# Patient Record
Sex: MALE | Race: WHITE | NOT HISPANIC OR LATINO | Employment: OTHER | URBAN - METROPOLITAN AREA
[De-identification: names, ages, dates, MRNs, and addresses within clinical notes are randomized per-mention and may not be internally consistent; named-entity substitution may affect disease eponyms.]

---

## 2020-12-22 ENCOUNTER — RECORDS - HEALTHEAST (OUTPATIENT)
Dept: LAB | Facility: CLINIC | Age: 65
End: 2020-12-22

## 2020-12-22 LAB
SARS-COV-2 PCR COMMENT: NORMAL
SARS-COV-2 RNA SPEC QL NAA+PROBE: NEGATIVE
SARS-COV-2 VIRUS SPECIMEN SOURCE: NORMAL

## 2021-09-16 DIAGNOSIS — Z11.59 ENCOUNTER FOR SCREENING FOR OTHER VIRAL DISEASES: ICD-10-CM

## 2021-09-26 ENCOUNTER — LAB (OUTPATIENT)
Dept: FAMILY MEDICINE | Facility: CLINIC | Age: 66
End: 2021-09-26
Attending: OPHTHALMOLOGY
Payer: MEDICARE

## 2021-09-26 DIAGNOSIS — Z11.59 ENCOUNTER FOR SCREENING FOR OTHER VIRAL DISEASES: ICD-10-CM

## 2021-09-26 PROCEDURE — U0005 INFEC AGEN DETEC AMPLI PROBE: HCPCS

## 2021-09-26 PROCEDURE — U0003 INFECTIOUS AGENT DETECTION BY NUCLEIC ACID (DNA OR RNA); SEVERE ACUTE RESPIRATORY SYNDROME CORONAVIRUS 2 (SARS-COV-2) (CORONAVIRUS DISEASE [COVID-19]), AMPLIFIED PROBE TECHNIQUE, MAKING USE OF HIGH THROUGHPUT TECHNOLOGIES AS DESCRIBED BY CMS-2020-01-R: HCPCS

## 2021-09-27 LAB — SARS-COV-2 RNA RESP QL NAA+PROBE: NEGATIVE

## 2021-09-28 ENCOUNTER — ANESTHESIA EVENT (OUTPATIENT)
Dept: SURGERY | Facility: CLINIC | Age: 66
End: 2021-09-28
Payer: MEDICARE

## 2021-09-28 NOTE — ANESTHESIA PREPROCEDURE EVALUATION
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 No pertinent surgical history.   No Known Allergies   Social History     Tobacco Use     Smoking status: Never Smoker     Smokeless tobacco: Never Used   Substance Use Topics     Alcohol use: Not Currently      Wt Readings from Last 1 Encounters:   No data found for Wt        Anesthesia Evaluation            ROS/MED HX  ENT/Pulmonary: Comment: [facility] - neg pulmonary ROS     Neurologic:  - neg neurologic ROS     Cardiovascular:     (+) Dyslipidemia hypertension-----    METS/Exercise Tolerance:     Hematologic:  - neg hematologic  ROS     Musculoskeletal:   (+) arthritis,     GI/Hepatic:  - neg GI/hepatic ROS     Renal/Genitourinary:  - neg Renal ROS     Endo:     (+) type II DM,     Psychiatric/Substance Use:  - neg psychiatric ROS     Infectious Disease:       Malignancy:       Other:  - neg other ROS          Physical Exam    Airway        Mallampati: II   TM distance: > 3 FB   Neck ROM: full   Mouth opening: > 3 cm    Respiratory Devices and Support         Dental  no notable dental history         Cardiovascular   cardiovascular exam normal          Pulmonary   pulmonary exam normal                OUTSIDE LABS:  CBC: No results found for: WBC, HGB, HCT, PLT  BMP: No results found for: NA, POTASSIUM, CHLORIDE, CO2, BUN, CR, GLC  COAGS: No results found for: PTT, INR, FIBR  POC: No results found for: BGM, HCG, HCGS  HEPATIC: No results found for: ALBUMIN, PROTTOTAL, ALT, AST, GGT, ALKPHOS, BILITOTAL, BILIDIRECT, LANDY  OTHER: No results found for: PH, LACT, A1C, RAUDEL, PHOS, MAG, LIPASE, AMYLASE, TSH, T4, T3, CRP, SED    Anesthesia Plan    ASA Status:  2      Anesthesia Type: General.   Induction: Intravenous.            Consents    Anesthesia Plan(s) and associated risks, benefits, and realistic alternatives discussed. Questions answered and patient/representative(s) expressed understanding.     - Discussed with:  Patient         Postoperative Care            Comments:                DANIEL Chance CRNA

## 2021-09-29 ENCOUNTER — HOSPITAL ENCOUNTER (OUTPATIENT)
Facility: CLINIC | Age: 66
Discharge: HOME | End: 2021-09-29
Attending: OPHTHALMOLOGY | Admitting: OPHTHALMOLOGY
Payer: MEDICARE

## 2021-09-29 ENCOUNTER — ANESTHESIA (OUTPATIENT)
Dept: SURGERY | Facility: CLINIC | Age: 66
End: 2021-09-29
Payer: MEDICARE

## 2021-09-29 VITALS
BODY MASS INDEX: 29.82 KG/M2 | DIASTOLIC BLOOD PRESSURE: 75 MMHG | RESPIRATION RATE: 16 BRPM | HEART RATE: 55 BPM | WEIGHT: 190 LBS | HEIGHT: 67 IN | SYSTOLIC BLOOD PRESSURE: 129 MMHG | TEMPERATURE: 96.8 F | OXYGEN SATURATION: 99 %

## 2021-09-29 LAB — GLUCOSE BLDC GLUCOMTR-MCNC: 152 MG/DL (ref 70–99)

## 2021-09-29 PROCEDURE — 250N000011 HC RX IP 250 OP 636: Performed by: OPHTHALMOLOGY

## 2021-09-29 PROCEDURE — 370N000004 HC ANESTHESIA CATARACT PACKAGE: Performed by: OPHTHALMOLOGY

## 2021-09-29 PROCEDURE — 250N000011 HC RX IP 250 OP 636: Performed by: NURSE ANESTHETIST, CERTIFIED REGISTERED

## 2021-09-29 PROCEDURE — 250N000009 HC RX 250: Performed by: OPHTHALMOLOGY

## 2021-09-29 PROCEDURE — 250N000009 HC RX 250: Performed by: NURSE ANESTHETIST, CERTIFIED REGISTERED

## 2021-09-29 PROCEDURE — 258N000003 HC RX IP 258 OP 636: Performed by: NURSE ANESTHETIST, CERTIFIED REGISTERED

## 2021-09-29 PROCEDURE — 761N000008 HC RECOVERY CATRACT PACKAGE: Performed by: OPHTHALMOLOGY

## 2021-09-29 PROCEDURE — 360N000007 HC CATARACT SURGICAL PACKAGE: Performed by: OPHTHALMOLOGY

## 2021-09-29 PROCEDURE — V2632 POST CHMBR INTRAOCULAR LENS: HCPCS | Performed by: OPHTHALMOLOGY

## 2021-09-29 DEVICE — EYE IMP IOL AMO PCL TECNIS ZCB00 18.0: Type: IMPLANTABLE DEVICE | Site: EYE | Status: FUNCTIONAL

## 2021-09-29 RX ORDER — MULTIPLE VITAMINS W/ MINERALS TAB 9MG-400MCG
1 TAB ORAL DAILY
COMMUNITY

## 2021-09-29 RX ORDER — SODIUM CHLORIDE, SODIUM LACTATE, POTASSIUM CHLORIDE, CALCIUM CHLORIDE 600; 310; 30; 20 MG/100ML; MG/100ML; MG/100ML; MG/100ML
INJECTION, SOLUTION INTRAVENOUS CONTINUOUS
Status: DISCONTINUED | OUTPATIENT
Start: 2021-09-29 | End: 2021-09-29 | Stop reason: HOSPADM

## 2021-09-29 RX ORDER — LIDOCAINE 40 MG/G
CREAM TOPICAL
Status: DISCONTINUED | OUTPATIENT
Start: 2021-09-29 | End: 2021-09-29 | Stop reason: HOSPADM

## 2021-09-29 RX ORDER — ATORVASTATIN CALCIUM 20 MG/1
20 TABLET, FILM COATED ORAL DAILY
COMMUNITY

## 2021-09-29 RX ORDER — LISINOPRIL 40 MG/1
40 TABLET ORAL DAILY
COMMUNITY

## 2021-09-29 RX ORDER — LIDOCAINE HYDROCHLORIDE 20 MG/ML
JELLY TOPICAL PRN
Status: DISCONTINUED | OUTPATIENT
Start: 2021-09-29 | End: 2021-09-29 | Stop reason: HOSPADM

## 2021-09-29 RX ORDER — TROPICAMIDE 10 MG/ML
1 SOLUTION/ DROPS OPHTHALMIC
Status: COMPLETED | OUTPATIENT
Start: 2021-09-29 | End: 2021-09-29

## 2021-09-29 RX ORDER — BALANCED SALT SOLUTION 6.4; .75; .48; .3; 3.9; 1.7 MG/ML; MG/ML; MG/ML; MG/ML; MG/ML; MG/ML
SOLUTION OPHTHALMIC PRN
Status: DISCONTINUED | OUTPATIENT
Start: 2021-09-29 | End: 2021-09-29 | Stop reason: HOSPADM

## 2021-09-29 RX ORDER — HYDROCHLOROTHIAZIDE 25 MG/1
25 TABLET ORAL DAILY
COMMUNITY

## 2021-09-29 RX ORDER — ASPIRIN 81 MG/1
81 TABLET ORAL DAILY
COMMUNITY

## 2021-09-29 RX ORDER — PROPARACAINE HYDROCHLORIDE 5 MG/ML
SOLUTION/ DROPS OPHTHALMIC PRN
Status: DISCONTINUED | OUTPATIENT
Start: 2021-09-29 | End: 2021-09-29 | Stop reason: HOSPADM

## 2021-09-29 RX ADMIN — TROPICAMIDE 1 DROP: 10 SOLUTION/ DROPS OPHTHALMIC at 07:15

## 2021-09-29 RX ADMIN — CYCLOPENTOLATE HYDROCHLORIDE AND PHENYLEPHRINE HYDROCHLORIDE 1 DROP: 2; 10 SOLUTION/ DROPS OPHTHALMIC at 07:30

## 2021-09-29 RX ADMIN — MIDAZOLAM 2 MG: 1 INJECTION INTRAMUSCULAR; INTRAVENOUS at 08:46

## 2021-09-29 RX ADMIN — CYCLOPENTOLATE HYDROCHLORIDE AND PHENYLEPHRINE HYDROCHLORIDE 1 DROP: 2; 10 SOLUTION/ DROPS OPHTHALMIC at 07:15

## 2021-09-29 RX ADMIN — SODIUM CHLORIDE, POTASSIUM CHLORIDE, SODIUM LACTATE AND CALCIUM CHLORIDE: 600; 310; 30; 20 INJECTION, SOLUTION INTRAVENOUS at 07:31

## 2021-09-29 RX ADMIN — LIDOCAINE HYDROCHLORIDE 0.1 ML: 10 INJECTION, SOLUTION EPIDURAL; INFILTRATION; INTRACAUDAL; PERINEURAL at 07:31

## 2021-09-29 RX ADMIN — TROPICAMIDE 1 DROP: 10 SOLUTION/ DROPS OPHTHALMIC at 07:30

## 2021-09-29 RX ADMIN — CYCLOPENTOLATE HYDROCHLORIDE AND PHENYLEPHRINE HYDROCHLORIDE 1 DROP: 2; 10 SOLUTION/ DROPS OPHTHALMIC at 07:35

## 2021-09-29 RX ADMIN — TROPICAMIDE 1 DROP: 10 SOLUTION/ DROPS OPHTHALMIC at 07:35

## 2021-09-29 ASSESSMENT — MIFFLIN-ST. JEOR: SCORE: 1600.46

## 2021-09-29 NOTE — ANESTHESIA CARE TRANSFER NOTE
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    Anesthesia Type:   General     Note:    Oropharynx: spontaneously breathing  Level of Consciousness: drowsy  Oxygen Supplementation: room air    Independent Airway: airway patency satisfactory and stable  Dentition: dentition unchanged  Vital Signs Stable: post-procedure vital signs reviewed and stable  Report to RN Given: handoff report given  Patient transferred to: Phase II    Handoff Report: Identifed the Patient, Identified the Reponsible Provider, Reviewed the pertinent medical history, Discussed the surgical course, Reviewed Intra-OP anesthesia mangement and issues during anesthesia, Set expectations for post-procedure period and Allowed opportunity for questions and acknowledgement of understanding      Vitals:  Vitals Value Taken Time   BP     Temp     Pulse     Resp     SpO2 98 % 09/29/21 0908   Vitals shown include unvalidated device data.    Electronically Signed By: DANIEL Jacobson CRNA  September 29, 2021  9:09 AM

## 2021-09-29 NOTE — OR NURSING
To sds. Awake and alert. Doing well with no pain. Up to recliner to eat and drink milli well. Will discharge when ready. Call for ride from friend mitra. Instructions reviewed and no questions. talks to pt postop

## 2021-09-29 NOTE — OP NOTE
OPHTHALMOLOGY OPERATIVE NOTE    PATIENT: Ezra Mcbride  DATE OF SURGERY: 9/29/2021  PREOPERATIVE DIAGNOSIS:  Senile Nuclear Cataract, Right eye  POSTOPERATIVE DIAGNOSIS:  Senile Nuclear Cataract, Right eye  OPERATIVE PROCEDURE:  Phacoemulsification with placement of intraocular lens  SURGEON:  Juan Prado MD  ANESTHESIA:  Topical / MAC  EBL:  None  SPECIMENS:  None  COMPLICATIONS:  None    PROCEDURE:  The patient was brought to the operating room at [facility].  The right eye was prepped and draped in the usual fashion for cataract surgery.  A wire lid speculum was inserted.  A super sharp blade was used to make a paracentesis at the 11 O'clock position.  The super sharp blade was used to make a partial thickness temporal groove, which was 3 mm in length.  0.8 mL of non-preserved epi-Shugarcaine was injected into the anterior chamber.  Viscoelastic was used to inflate the anterior chamber through a cannula.  A 2.5 mm microkeratome was used to make a temporal clear corneal incision in a two-plane fashion.  A cystotome needle and forceps were used to make a capsulorrhexis.  Hydrodissection and hydrodelineation were performed with Balance Salt Solution.  The lens was then phacoemulsified and removed without complications.  The cortical material was removed with bimanual irrigation and aspiration.  The capsular bag was filled with viscoelastic.  A posterior chamber intraocular lens, preselected and recorded, was folded and inserted into the capsular bag.  The viscoelastic was removed with the irrigation and aspiration tip.  Balanced Salt Solution with Vigamox, 150mg/0.1mL, was used to refill the anterior chamber.  The wounds were checked for water tightness and required no suture.  The wire lid speculum was removed.  The patient's right eye was cleaned and a drop of each post-operative drop was placed, followed by a pablo shield.  The patient tolerated the procedure well, and there  were no complications.      Juan Prado MD

## 2021-09-29 NOTE — ANESTHESIA POSTPROCEDURE EVALUATION
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    Anesthesia Type:  General    Note:  Disposition: Outpatient   Postop Pain Control: Uneventful            Sign Out: Well controlled pain   PONV: No   Neuro/Psych: Uneventful            Sign Out: Acceptable/Baseline neuro status   Airway/Respiratory: Uneventful            Sign Out: Acceptable/Baseline resp. status   CV/Hemodynamics: Uneventful            Sign Out: Acceptable CV status   Other NRE: NONE   DID A NON-ROUTINE EVENT OCCUR? No           Last vitals:  Vitals Value Taken Time   BP     Temp     Pulse     Resp     SpO2 98 % 09/29/21 0908   Vitals shown include unvalidated device data.    Electronically Signed By: DANIEL Jacobson CRNA  September 29, 2021  9:09 AM

## 2022-02-01 ENCOUNTER — LAB REQUISITION (OUTPATIENT)
Dept: LAB | Facility: CLINIC | Age: 67
End: 2022-02-01

## 2022-02-01 PROCEDURE — U0005 INFEC AGEN DETEC AMPLI PROBE: HCPCS | Performed by: INTERNAL MEDICINE

## 2022-02-02 LAB — SARS-COV-2 RNA RESP QL NAA+PROBE: POSITIVE

## 2024-04-18 ENCOUNTER — TRANSFERRED RECORDS (OUTPATIENT)
Dept: HEALTH INFORMATION MANAGEMENT | Facility: CLINIC | Age: 69
End: 2024-04-18

## 2024-04-23 ENCOUNTER — TRANSFERRED RECORDS (OUTPATIENT)
Dept: HEALTH INFORMATION MANAGEMENT | Facility: CLINIC | Age: 69
End: 2024-04-23

## 2024-04-29 ENCOUNTER — TRANSFERRED RECORDS (OUTPATIENT)
Dept: HEALTH INFORMATION MANAGEMENT | Facility: CLINIC | Age: 69
End: 2024-04-29

## 2024-05-02 ENCOUNTER — TRANSFERRED RECORDS (OUTPATIENT)
Dept: HEALTH INFORMATION MANAGEMENT | Facility: CLINIC | Age: 69
End: 2024-05-02

## 2024-05-13 ENCOUNTER — TRANSFERRED RECORDS (OUTPATIENT)
Dept: HEALTH INFORMATION MANAGEMENT | Facility: CLINIC | Age: 69
End: 2024-05-13

## 2024-05-16 ENCOUNTER — TRANSFERRED RECORDS (OUTPATIENT)
Dept: HEALTH INFORMATION MANAGEMENT | Facility: CLINIC | Age: 69
End: 2024-05-16

## 2024-05-21 ENCOUNTER — TRANSFERRED RECORDS (OUTPATIENT)
Dept: HEALTH INFORMATION MANAGEMENT | Facility: CLINIC | Age: 69
End: 2024-05-21

## 2024-05-21 LAB — EJECTION FRACTION: 58.5 %

## 2024-05-30 ENCOUNTER — TRANSFERRED RECORDS (OUTPATIENT)
Dept: HEALTH INFORMATION MANAGEMENT | Facility: CLINIC | Age: 69
End: 2024-05-30

## 2024-07-01 NOTE — PROGRESS NOTES
[facility]    RADIATION ONCOLOGY CONSULTATION      Tal Mcbride  is referred by Dr. Chris Coe for an oncology consultation.    PRIMARY PHYSICIAN: Juan Tariq     Cancer Staging   No matching staging information was found for the patient.      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 He has significant mesorectal adenopathy. The seminal vesicle border is close but not radiographically involved. He has had a colostomy placed as well. Ov erall, he seems to be comfortable with his diagnosis and has a reasonable grasp on tri-modality therapy.     PLAN: I would recommend total neoadjuvant therapy with chemotherapy followed by radiation and chemotherapy.  Surgical evaluation would follow. I reviewed in general the course of radiation with him.  He has indicated that he would prefer treatment here at [facility] so a referral for transfer of care will be set up. It has been a few weeks since his radiological workup, so I am going to order a PET/CT scan for staging as well.     No orders of the defined types were placed in this encounter.     ______________________________________________________________________  HISTORY OF PRESENT ILLNESS: Ezra is a 68-year-old male who works part-time at [email or web address], presenting with moderately to poorly differentiated adenocarcinoma of the rectum.  This mass is noted 8 cm from the anal verge involving the low, middle and upper rectum extending over a length of roughly 12 cm.  Involving the mesorectum with significant invasion anterior laterally on the right at the level of the mid rectum.  It is contiguous with the posterior margin of the right seminal vesicle but is not clearly invaded.  There is mesorectal adenopathy.  To date he has met with surgery in which the discussion included a diverting loop colostomy versus a permanent colostomy.  In addition he has met with medical oncology to discuss total  neoadjuvant chemoradiotherapy followed by surgery and/or additional chemotherapy.  History as below    4/23/2024 CT abdomen with and without contrast: There is a 6.7 cm circumscribed fluid density lesion in the left renal pelvis consistent with marked pyelo calyceal distention.  There is marked mural thickening involving the distal sigmoid colon and rectum, recommend direct visualization.    4/29/2024 sigmoidoscopy operative note: The digital rectal exam revealed a soft rectal mass palpated 8 cm from the anal verge.  The mass was noncircumferential and located predominantly at the posterior bowel wall.  An infiltrative partially obstructing large mass was found in the rectum.  The mass was circumferential.  The mass measures 4 cm in length.  No bleeding present.    4/29/2024 duodenum, biopsy: No significant pathologic abnormality.  Stomach, biopsy: No significant pathologic abnormality.  Rectal mass, 8 cm from anal verge, biopsy: Moderately to poorly differentiated adenocarcinoma, ulcerated.  There is focal submucosal spread of the tumor involving a fragment with benign nonneoplastic overlying colonic mucosa.  The pattern raises concern for lymphatic invasion.  Depth of invasion cannot be determined.  There is no definite adenomatous polyp in the background.    5/2/2024 CT chest with contrast: No definite findings for metastatic disease in the chest.  2 small pulmonary nodules measuring up to 4 mm unchanged from prior study    5/2/2024 MR pelvis with and without contrast: Rectal carcinoma involving the lower, middle and upper thirds, stage T3c N2b.  A carcinoma involving the low, middle and upper rectum extending over a length of roughly 12 cm is demonstrated.  The lesion involves the mesorectum with significant invasion anterolaterally on the right at the level of the mid rectum.  At this site infiltrative neoplasm is contiguous with the posterior margin of the right seminal vesicle but is not clearly invaded.   Neoplasm extends roughly 1.5 cm beyond the rectal margin.  There are numerous enlarged mesorectal lymph nodes demonstrated.    5/13/2024 medical oncology consultation: Discussed plan per NCCN guidelines to include total neoadjuvant chemotherapy plus or minus radiation for 6 months followed by potential surgery.    5/29/2024 surgery consult: Discussed diverting loop colostomy versus permanent colostomy.  Wife is in favor of a permanent colostomy.  Patient to decide.    5/30/2024 CT abdomen pelvis: Large infiltrative rectal mass involving the upper middle and lower rectum.  There is surrounding inflammatory change and pelvic free fluid.  There is adjacent mesorectal adenopathy a left internal iliac node as well.  Enlarged prostate gland.  Left probable UPJ obstruction.    Scheduling Conflicts: Denies   Systemic Therapy: Pending   Port: Yes  Pacemaker: denies  Hx of autoimmune disorders: denies   Previous Radiation Therapy: denies       Depression Screening Follow Up        Follow Up Actions Taken  Crisis resource information provided in After Visit Summary  Patient declined referral.     Patient agreeable to meet with .  spent a great amount of time with patient. See note associated with this encounter.     Past Medical History:   Diagnosis Date    Diabetes (H)     Hypertension        Past Surgical History:   Procedure Laterality Date    PHACOEMULSIFICATION WITH STANDARD INTRAOCULAR LENS IMPLANT Right 9/29/2021    Procedure: right Cataract Extraction with Implant;  Surgeon: Juan Prado MD;  Location: [facility] OR       No family history on file.    Social History     Tobacco Use    Smoking status: Never    Smokeless tobacco: Never   Substance Use Topics    Alcohol use: Not Currently   Works part time at [employer]       CURRENT MEDICATIONS:   Current Outpatient Medications   Medication Sig Dispense Refill    aspirin 81 MG EC tablet Take 81 mg by mouth daily      atorvastatin (LIPITOR) 20  MG tablet Take 20 mg by mouth daily      hydrochlorothiazide (HYDRODIURIL) 25 MG tablet Take 25 mg by mouth daily      lisinopril (ZESTRIL) 40 MG tablet Take 40 mg by mouth daily      metFORMIN (GLUCOPHAGE) 1000 MG tablet Take 1,000 mg by mouth 2 times daily (with meals)      multivitamin w/minerals (MULTI-VITAMIN) tablet Take 1 tablet by mouth daily       No current facility-administered medications for this visit.         ALLERGIES:  No Known Allergies        Review of Systems   Constitutional:  Positive for malaise/fatigue and weight loss. Negative for chills and fever.   Respiratory:  Negative for shortness of breath.    Cardiovascular:  Negative for chest pain.   Gastrointestinal:  Negative for abdominal pain, diarrhea (Has colostomy), nausea and vomiting.   Psychiatric/Behavioral:  The patient has insomnia (due to rectal discomfort).            VITAL SIGNS:  There were no vitals taken for this visit.  Wt Readings from Last 4 Encounters:   09/29/21 86.2 kg (190 lb)       PHYSICAL EXAM:  Constitutional: awake, alert, cooperative, no apparent distress, and appears stated age  Eyes: Lids and lashes normal, sclera clear, conjunctiva normal  ENT: normocephalic, without obvious abnormality  Hematologic / Lymphatic: no cervical lymphadenopathy and no supraclavicular lymphadenopathy  Respiratory: No increased work of breathing, good air exchange, clear to auscultation bilaterally, no crackles or wheezing  Cardiovascular: normal S1 and S2  GI: soft, non-distended, colostomy present  Skin: no redness, warmth, or swelling  Musculoskeletal: tone is normal, patient sits alternating hip to hip as sitting directly on his bottom would be painful   Neurologic: Awake, alert, oriented to name, place and time.  gait is normal.  Neuropsychiatric: General: normal, calm, and normal eye contact    Physician attestation:  I saw and evaluated patient as part of a shared APRN visit. I personally reviewed his chart and xray studies. Key  management decisions made by me and carried out under my direction include: referral to medical oncology in [place] and getting a PET/CT scan for further staging.     JAMAAL spent 10 minutes with the patient and 5  minutes on chart preparation and review, for a total of 15 minutes   Physician spent 20 minutes with the patient and 20 minutes on chart, image, and documentation review, for a total of 40 minutes.     Total billable time: 55 minutes on this date of service           DANIEL Gonzales CNP

## 2024-07-03 ENCOUNTER — DOCUMENTATION ONLY (OUTPATIENT)
Dept: RADIATION ONCOLOGY | Facility: HOSPITAL | Age: 69
End: 2024-07-03

## 2024-07-03 ENCOUNTER — ONCOLOGY VISIT (OUTPATIENT)
Dept: RADIATION ONCOLOGY | Facility: HOSPITAL | Age: 69
End: 2024-07-03
Attending: RADIOLOGY
Payer: COMMERCIAL

## 2024-07-03 VITALS
BODY MASS INDEX: 23.73 KG/M2 | RESPIRATION RATE: 16 BRPM | DIASTOLIC BLOOD PRESSURE: 70 MMHG | HEART RATE: 68 BPM | HEIGHT: 69 IN | SYSTOLIC BLOOD PRESSURE: 138 MMHG | TEMPERATURE: 99.1 F | OXYGEN SATURATION: 97 % | WEIGHT: 160.2 LBS

## 2024-07-03 DIAGNOSIS — C20 RECTAL CANCER (H): Primary | ICD-10-CM

## 2024-07-03 PROBLEM — M41.125 ADOLESCENT IDIOPATHIC SCOLIOSIS OF THORACOLUMBAR REGION: Status: ACTIVE | Noted: 2017-11-16

## 2024-07-03 PROBLEM — E43 SEVERE PROTEIN-CALORIE MALNUTRITION (H): Status: ACTIVE | Noted: 2024-06-07

## 2024-07-03 PROCEDURE — G0463 HOSPITAL OUTPT CLINIC VISIT: HCPCS

## 2024-07-03 PROCEDURE — 99205 OFFICE O/P NEW HI 60 MIN: CPT | Mod: FS | Performed by: RADIOLOGY

## 2024-07-03 RX ORDER — ACETAMINOPHEN 500 MG
650 TABLET ORAL EVERY 6 HOURS PRN
COMMUNITY

## 2024-07-03 ASSESSMENT — ENCOUNTER SYMPTOMS
INSOMNIA: 1
NAUSEA: 0
FEVER: 0
ABDOMINAL PAIN: 0
SHORTNESS OF BREATH: 0
DIARRHEA: 0
CHILLS: 0
VOMITING: 0
WEIGHT LOSS: 1

## 2024-07-03 ASSESSMENT — PATIENT HEALTH QUESTIONNAIRE - PHQ9: SUM OF ALL RESPONSES TO PHQ QUESTIONS 1-9: 17

## 2024-07-03 NOTE — PROGRESS NOTES
"Radiation Oncology Rooming Note    July 3, 2024 8:26 AM     Tal Mcbride is a 68 year old male who presents for:       Chief Complaint   Patient presents with    Consult     Radiation therapy consult       Initial Vitals: Wt 72.7 kg (160 lb 3.2 oz)   BMI 25.09 kg/m     Estimated body mass index is 25.09 kg/m  as calculated from the following:    Height as of 9/29/21: 1.702 m (5' 7\").    Weight as of this encounter: 72.7 kg (160 lb 3.2 oz).   Body surface area is 1.85 meters squared.  Data Unavailable Comment: Data Unavailable       Allergies reviewed: Yes  Medications reviewed: Yes      Patient completed the following questionnaires: PHQ-9 and NCCN Distress Thermometer.     PHQ-9= 17.  Providers notified and patient met with SW.      Patient was assessed using the NCCN psychosocial distress thermometer. Patient rated the score as a patient declined to complete form. Patient rated current stressors as NA. Stressors brought to the attention of provider for a score of 6 or greater or per nurses discretion.       Patient received folder containing the following:  advance directives information/application  financial letter  vaccines during cancer treatment hand out  mental health services and providers packet  cancer resource list  cancer rehab information handout  family support group handouts  radiation therapy business card      Financial assistance resources given to patient:  [employer] application  [employer] application   [employer]      Nutrition Assessment    Height: Data Unavailable Weight: 160 lbs 3.2 oz    Usual Weight: 200# Weight Change: -40# since 5/2024,  lowest weight 150# gained 10 lbs over the past month      Past Medical History:   Diagnosis Date    Adolescent idiopathic scoliosis of thoracolumbar region 11/16/2017    Bunion 03/28/2013    Diabetes (H)     Essential hypertension 03/05/2012    Formatting of this note might be different from the original.   Epic      Hypertension     Pure " hypercholesterolemia 10/04/2012    Rectal cancer (H) 05/29/2024    Severe protein-calorie malnutrition (H24) 06/07/2024    Type 2 diabetes mellitus without complication (H) 06/21/2012       1. Receiving Chemotherapy? Yes  - 1 point  2. Weight Loss per Month (in pounds) Based on Usual Weight: 3    100# 100-120# 120-150# 150-200# greater than 200# Pt. System   greater than 5 5 - 6 6 - 8 7 - 10 greater than 10 1 Point   greater than 7 7- 9 9 - 11 11 - 14 greater than 15 2 Points   greater than 10 10 - 12 12 - 15 15 - 20 greater than 20 3 Points       3. Eating Problems: ( 1 Point for Each Problem)       Loss of Appetite     No - 0 points    Difficulty Swallowing    No - 0 points   Nausea    No - 0 points    Early Satiety    No - 0 points   Vomiting    No - 0 points    Taste/Smell Aversions  No - 0 points    Diarrhea    No - 0 points    Esophageal Reflux   No - 0 points   Mouth Soreness/Difficulty Chewing  No - 0 points          Total: 0    4.  Automatic Referral for the Following Diagnosis or Situations:  NA     Comments: Patient declines dietary referral.  He is is now able to eat a regular diet and is gaining weight.      Total Score: 4 (Scores greater than or equal to 4 will receive a Dietician Consult)        Marie Collado RN

## 2024-07-03 NOTE — NURSING NOTE
PET/CT is booked out unitl 8/14/24 at [facility].  Spoke to patient and he is in agreement with having this done at [facility].  PET/CT order, demographics, and radiation oncology note emailed to [facility] Central Scheduling.      Marie Collado RN

## 2024-07-16 ENCOUNTER — TRANSFERRED RECORDS (OUTPATIENT)
Dept: HEALTH INFORMATION MANAGEMENT | Facility: CLINIC | Age: 69
End: 2024-07-16

## 2024-07-17 ENCOUNTER — ONCOLOGY VISIT (OUTPATIENT)
Dept: ONCOLOGY | Facility: OTHER | Age: 69
End: 2024-07-17
Payer: COMMERCIAL

## 2024-07-17 ENCOUNTER — CARE COORDINATION (OUTPATIENT)
Dept: BEHAVIORAL HEALTH | Facility: OTHER | Age: 69
End: 2024-07-17

## 2024-07-17 VITALS
TEMPERATURE: 98.2 F | OXYGEN SATURATION: 96 % | SYSTOLIC BLOOD PRESSURE: 126 MMHG | BODY MASS INDEX: 23.77 KG/M2 | DIASTOLIC BLOOD PRESSURE: 62 MMHG | HEART RATE: 66 BPM | WEIGHT: 160.94 LBS

## 2024-07-17 DIAGNOSIS — C20 RECTAL CANCER (H): Primary | ICD-10-CM

## 2024-07-17 LAB
ALBUMIN SERPL BCG-MCNC: 4.2 G/DL (ref 3.5–5.2)
ALP SERPL-CCNC: 145 U/L (ref 40–150)
ALT SERPL W P-5'-P-CCNC: 22 U/L (ref 0–70)
ANION GAP SERPL CALCULATED.3IONS-SCNC: 9 MMOL/L (ref 7–15)
AST SERPL W P-5'-P-CCNC: 17 U/L (ref 0–45)
BASOPHILS # BLD AUTO: 0.1 10E3/UL (ref 0–0.2)
BASOPHILS NFR BLD AUTO: 1 %
BILIRUB SERPL-MCNC: 0.4 MG/DL
BUN SERPL-MCNC: 27.6 MG/DL (ref 8–23)
CALCIUM SERPL-MCNC: 9.7 MG/DL (ref 8.8–10.4)
CHLORIDE SERPL-SCNC: 96 MMOL/L (ref 98–107)
CREAT SERPL-MCNC: 1.01 MG/DL (ref 0.67–1.17)
EGFRCR SERPLBLD CKD-EPI 2021: 81 ML/MIN/1.73M2
EOSINOPHIL # BLD AUTO: 0.4 10E3/UL (ref 0–0.7)
EOSINOPHIL NFR BLD AUTO: 3 %
ERYTHROCYTE [DISTWIDTH] IN BLOOD BY AUTOMATED COUNT: 12.9 % (ref 10–15)
GLUCOSE SERPL-MCNC: 154 MG/DL (ref 70–99)
HCO3 SERPL-SCNC: 28 MMOL/L (ref 22–29)
HCT VFR BLD AUTO: 35.4 % (ref 40–53)
HGB BLD-MCNC: 12.1 G/DL (ref 13.3–17.7)
IMM GRANULOCYTES # BLD: 0.1 10E3/UL
IMM GRANULOCYTES NFR BLD: 1 %
LYMPHOCYTES # BLD AUTO: 2 10E3/UL (ref 0.8–5.3)
LYMPHOCYTES NFR BLD AUTO: 16 %
MCH RBC QN AUTO: 30.4 PG (ref 26.5–33)
MCHC RBC AUTO-ENTMCNC: 34.2 G/DL (ref 31.5–36.5)
MCV RBC AUTO: 89 FL (ref 78–100)
MONOCYTES # BLD AUTO: 0.9 10E3/UL (ref 0–1.3)
MONOCYTES NFR BLD AUTO: 8 %
NEUTROPHILS # BLD AUTO: 8.8 10E3/UL (ref 1.6–8.3)
NEUTROPHILS NFR BLD AUTO: 72 %
NRBC # BLD AUTO: 0 10E3/UL
NRBC BLD AUTO-RTO: 0 /100
PLATELET # BLD AUTO: 401 10E3/UL (ref 150–450)
POTASSIUM SERPL-SCNC: 4.4 MMOL/L (ref 3.4–5.3)
PROT SERPL-MCNC: 7.6 G/DL (ref 6.4–8.3)
RBC # BLD AUTO: 3.98 10E6/UL (ref 4.4–5.9)
SODIUM SERPL-SCNC: 133 MMOL/L (ref 135–145)
WBC # BLD AUTO: 12.2 10E3/UL (ref 4–11)

## 2024-07-17 PROCEDURE — 99417 PROLNG OP E/M EACH 15 MIN: CPT | Performed by: INTERNAL MEDICINE

## 2024-07-17 PROCEDURE — 82378 CARCINOEMBRYONIC ANTIGEN: CPT | Mod: ZL | Performed by: INTERNAL MEDICINE

## 2024-07-17 PROCEDURE — 85025 COMPLETE CBC W/AUTO DIFF WBC: CPT | Mod: ZL | Performed by: INTERNAL MEDICINE

## 2024-07-17 PROCEDURE — 99205 OFFICE O/P NEW HI 60 MIN: CPT | Performed by: INTERNAL MEDICINE

## 2024-07-17 PROCEDURE — G0463 HOSPITAL OUTPT CLINIC VISIT: HCPCS

## 2024-07-17 PROCEDURE — 80053 COMPREHEN METABOLIC PANEL: CPT | Mod: ZL | Performed by: INTERNAL MEDICINE

## 2024-07-17 PROCEDURE — 36415 COLL VENOUS BLD VENIPUNCTURE: CPT | Mod: ZL | Performed by: INTERNAL MEDICINE

## 2024-07-17 RX ORDER — METHYLPREDNISOLONE SODIUM SUCCINATE 125 MG/2ML
125 INJECTION, POWDER, LYOPHILIZED, FOR SOLUTION INTRAMUSCULAR; INTRAVENOUS
Status: CANCELLED
Start: 2024-07-20

## 2024-07-17 RX ORDER — ONDANSETRON 2 MG/ML
8 INJECTION INTRAMUSCULAR; INTRAVENOUS ONCE
Status: CANCELLED | OUTPATIENT
Start: 2024-07-24

## 2024-07-17 RX ORDER — TRAZODONE HYDROCHLORIDE 100 MG/1
100 TABLET ORAL AT BEDTIME
COMMUNITY
Start: 2024-07-08

## 2024-07-17 RX ORDER — HEPARIN SODIUM (PORCINE) LOCK FLUSH IV SOLN 100 UNIT/ML 100 UNIT/ML
5 SOLUTION INTRAVENOUS
Status: CANCELLED | OUTPATIENT
Start: 2024-07-20

## 2024-07-17 RX ORDER — FLUOROURACIL 50 MG/ML
400 INJECTION, SOLUTION INTRAVENOUS ONCE
Status: CANCELLED | OUTPATIENT
Start: 2024-07-20

## 2024-07-17 RX ORDER — HEPARIN SODIUM,PORCINE 10 UNIT/ML
5-20 VIAL (ML) INTRAVENOUS DAILY PRN
Status: CANCELLED | OUTPATIENT
Start: 2024-07-20

## 2024-07-17 RX ORDER — METFORMIN HYDROCHLORIDE 750 MG/1
750 TABLET, EXTENDED RELEASE ORAL 2 TIMES DAILY WITH MEALS
COMMUNITY
Start: 2024-07-03

## 2024-07-17 RX ORDER — MEPERIDINE HYDROCHLORIDE 25 MG/ML
25 INJECTION INTRAMUSCULAR; INTRAVENOUS; SUBCUTANEOUS EVERY 30 MIN PRN
Status: CANCELLED | OUTPATIENT
Start: 2024-07-20

## 2024-07-17 RX ORDER — ALBUTEROL SULFATE 0.83 MG/ML
2.5 SOLUTION RESPIRATORY (INHALATION)
Status: CANCELLED | OUTPATIENT
Start: 2024-07-20

## 2024-07-17 RX ORDER — LORAZEPAM 2 MG/ML
0.5 INJECTION INTRAMUSCULAR EVERY 4 HOURS PRN
Status: CANCELLED | OUTPATIENT
Start: 2024-07-20

## 2024-07-17 RX ORDER — DIPHENHYDRAMINE HYDROCHLORIDE 50 MG/ML
50 INJECTION INTRAMUSCULAR; INTRAVENOUS
Status: CANCELLED
Start: 2024-07-20

## 2024-07-17 RX ORDER — HEPARIN SODIUM (PORCINE) LOCK FLUSH IV SOLN 100 UNIT/ML 100 UNIT/ML
5 SOLUTION INTRAVENOUS
Status: CANCELLED | OUTPATIENT
Start: 2024-07-25

## 2024-07-17 RX ORDER — ALBUTEROL SULFATE 90 UG/1
1-2 AEROSOL, METERED RESPIRATORY (INHALATION)
Status: CANCELLED
Start: 2024-07-20

## 2024-07-17 RX ORDER — HEPARIN SODIUM,PORCINE 10 UNIT/ML
5-20 VIAL (ML) INTRAVENOUS DAILY PRN
Status: CANCELLED | OUTPATIENT
Start: 2024-07-25

## 2024-07-17 RX ORDER — EPINEPHRINE 1 MG/ML
0.3 INJECTION, SOLUTION, CONCENTRATE INTRAVENOUS EVERY 5 MIN PRN
Status: CANCELLED | OUTPATIENT
Start: 2024-07-20

## 2024-07-17 ASSESSMENT — PAIN SCALES - GENERAL: PAINLEVEL: MODERATE PAIN (5)

## 2024-07-17 ASSESSMENT — PATIENT HEALTH QUESTIONNAIRE - PHQ9: SUM OF ALL RESPONSES TO PHQ QUESTIONS 1-9: 18

## 2024-07-17 NOTE — NURSING NOTE
"Oncology Rooming Note    July 17, 2024 11:01 AM   Tal Mcbride is a 68 year old male who presents for:    Chief Complaint   Patient presents with    Oncology Clinic Visit     New consult Rectal Cancer     Initial Vitals: /62   Pulse 66   Temp 98.2  F (36.8  C) (Tympanic)   Wt 73 kg (160 lb 15 oz)   SpO2 96%   BMI 23.77 kg/m   Estimated body mass index is 23.77 kg/m  as calculated from the following:    Height as of 7/3/24: 1.753 m (5' 9\").    Weight as of this encounter: 73 kg (160 lb 15 oz). Body surface area is 1.89 meters squared.  Moderate Pain (5) Comment: rectal pain  No LMP for male patient.  Allergies reviewed: Yes  Medications reviewed: Yes    Medications: Medication refills not needed today.  Pharmacy name entered into [facility]: [email or web address] [address] [place] [address] [address] [facility]    Frailty Screening:   Is the patient here for a new oncology consult visit in cancer care? 1. Yes. Over the past month, have you experienced difficulty or required a caregiver to assist with:   1. Balance, walking or general mobility (including any falls)? NO  2. Completion of self-care tasks such as bathing, dressing, toileting, grooming/hygiene?  NO  3. Concentration or memory that affects your daily life?  NO       Clinical concerns: none     Patient was assessed using the NCCN psychosocial distress thermometer. Patient rated the score as a 5. Patient rated current stressors as pain. Stressors will be brought to the attention of provider or Oncology RN Care Coordinator for a score of 6 or greater or per nurses discretion.           Angie Amaya LPN             "

## 2024-07-17 NOTE — Clinical Note
Mr. Mcbride will be starting neoadjuvant chemotherapy with FOLFOX.  Per [facility] notes Tempus study was sent and a MSI.  If that was not done on his previous pathology then we should get the pathology here and send foundation 1.  Also can you please get the CT scan done 5/2/2024.  I am not sure if this was done probably at [facility] and get it scanned in epic.  The images and report.  Thank

## 2024-07-17 NOTE — PROGRESS NOTES
Care Coordination Focused Note:    Met w/pt in person.  States he and his spouse are looking for low-income housing.  Reports his current housing not being good for his health.  Writer will assist finding housing near the [facility].  Reports spouse making (approximately) $4,000 monthly and he receives S.S.I which is approximately $2,000.

## 2024-07-17 NOTE — PROGRESS NOTES
MEDICAL ONCOLOGY CONSULT NOTE  Jul 17, 2024    Reason for consult: Rectal cancer    HISTORY OF PRESENT ILLNESS  Tal Mcbride is a 68 year old male with PMH as stated below who is seen in the oncology clinic for rectal cancer.    His history in short is as follows:    Mr. Mcbride started having diarrhea in the end of March 2024.  He was evaluated by his primary care and recommended to undergo a colonoscopy and upper endoscopy.    4/29/2024: EGD: 3 cm hiatal hernia.  Normal stomach.  Normal third portion of the duodenum.  Nonobstructing Schatzki's ring.    4/29/2024: Sigmoidoscopy: The digital rectal exam revealed a soft tissue mass palpated 8 cm from the anal verge.  The mass was noncircumferential and located predominantly at the posterior bowel wall.  An infiltrative partially obstructing large mass was found in the rectum.  The mass was circumferential.  The mass measured 4 cm in length.  No bleeding was present.  This was biopsied.  A pediatric colonoscope was passed past the area but patient had a large quantity of stool present just above the stricture so colonoscopy was not able to be completed.    Biopsy of the rectal mass showed moderately to poorly differentiated adenocarcinoma.    5/2/2024 CT chest with contrast: No definite findings for metastatic disease in the chest. 2 small pulmonary nodules measuring up to 4 mm unchanged from prior study     5/2/2024: MRI pelvis with and without contrast: A carcinoma involving the lower, middle and upper rectum extending over a length of roughly 12 cm demonstrated.  This lesion involves the mesorectum with no significant invasion anterolaterally on the right at the level of the mid rectum.  At this site infiltrative neoplasm is contactless with the posterior margin of the right seminal vesicle but it is not clearly invaded.  Neoplasm extends roughly 1.5 cm beyond the rectal margin.  Numerous enlarged mesorectal lymph nodes are demonstrated.  The larger mesorectal lymph  node is demonstrated on the right measuring 1.8X 1.5 cm.  Additional mesorectal lymph nodes with short axis measurements at least 1 cm demonstrated.  Inferior mesenteric lymph nodes with short axis measurement of 1.1 cm X 1.0 cm are noted.  A left internal iliac node measuring 1.2X 0.8 cm is demonstrated.  A 2.7 X1.8 centimeter right iliac node is demonstrated.  The other visualized bowel is unremarkable.  The prostate is mildly enlarged.  No free fluid is demonstrated.    5/13/2024: CT abdomen and pelvis without contrast: Large infiltrative rectal mass involving the upper middle and lower rectum.  There is surrounding inflammatory change and pelvic free fluid.  There is adjacent mesorectal adenopathy, a left internal iliac node as well.  Large amount of stool throughout the colon.  Enlarged prostate gland.  Left probable UPJ obstruction.    6/6/2024: Diverting colostomy    Mr. Mcbride is doing well.  He has lost around 50 pounds over 3 months.  He was put on a liquid diet after his initial CT abdomen given concern for obstruction.  Since his colostomy he has been gaining weight and has gained 10 pounds back.  He is having mucus discharge from his anus mixed with blood.  He denies any abdominal pain but is having pressure around his rectum and anus.    REVIEW OF SYSTEMS  A 12-point ROS negative except as in HPI      Current Outpatient Medications   Medication Sig Dispense Refill    acetaminophen (TYLENOL) 500 MG tablet Take 500-1,000 mg by mouth every 6 hours as needed for mild pain      aspirin 81 MG EC tablet Take 81 mg by mouth daily      atorvastatin (LIPITOR) 20 MG tablet Take 20 mg by mouth daily      hydrochlorothiazide (HYDRODIURIL) 25 MG tablet Take 25 mg by mouth daily      lisinopril (ZESTRIL) 40 MG tablet Take 40 mg by mouth daily      metFORMIN (GLUCOPHAGE) 1000 MG tablet Take 750 mg by mouth 2 times daily (with meals)      metFORMIN (GLUCOPHAGE-XR) 750 MG 24 hr tablet Take 750 mg by mouth 2 times daily  (with meals)      multivitamin w/minerals (MULTI-VITAMIN) tablet Take 1 tablet by mouth daily      Oxymetazoline HCl (NASAL DECONGESTANT SPRAY NA) Spray in nostril as needed      Polyethylene Glycol 3350 (MIRALAX PO) Take 1 Capful by mouth daily      traZODone (DESYREL) 100 MG tablet Take 100 mg by mouth at bedtime         No Known Allergies  Immunization History   Administered Date(s) Administered    COVID-19 Bivalent 12+ (Pfizer) 10/27/2022    COVID-19 Monovalent 18+ (Moderna) 01/16/2021, 02/13/2021, 11/29/2021    Hepatitis B, Adult 10/03/2013, 03/28/2014, 12/30/2014    Influenza Vaccine (Flucelvax Quadrivalent) 10/07/2022    Pneumococcal 23 valent 10/03/2013    TDAP (Adacel,Boostrix) 10/03/2012       Past Medical History:   Diagnosis Date    Adolescent idiopathic scoliosis of thoracolumbar region 11/16/2017    Bunion 03/28/2013    Essential hypertension 03/05/2012    Formatting of this note might be different from the original.   Epic      Pure hypercholesterolemia 10/04/2012    Rectal cancer (H) 05/29/2024    Severe protein-calorie malnutrition (H24) 06/07/2024    Type 2 diabetes mellitus without complication (H) 06/21/2012       Past Surgical History:   Procedure Laterality Date    CATARACT EXTRACTION W/ INTRAOCULAR LENS IMPLANT Left 2021    ENDOSCOPY N/A     upper and lower    GI SURGERY      PHACOEMULSIFICATION WITH STANDARD INTRAOCULAR LENS IMPLANT Right 09/29/2021    Procedure: right Cataract Extraction with Implant;  Surgeon: Juan Prado MD;  Location: [facility] OR    robotic colostomy N/A 06/06/2024       SOCIAL HISTORY  History   Smoking Status    Former    Types: Cigarettes   Smokeless Tobacco    Former    Social History    Substance and Sexual Activity      Alcohol use: Not Currently     History   Drug Use Unknown       FAMILY HISTORY  Family History   Problem Relation Age of Onset    Pancreatic Cancer Father 55       PHYSICAL EXAMINATION  /62   Pulse 66   Temp 98.2  F (36.8  C)  (Tympanic)   Wt 73 kg (160 lb 15 oz)   SpO2 96%   BMI 23.77 kg/m    Wt Readings from Last 2 Encounters:   07/17/24 73 kg (160 lb 15 oz)   07/03/24 72.7 kg (160 lb 3.2 oz)     Physical Exam  Constitutional:       Appearance: Normal appearance.   Abdominal:      General: Abdomen is flat.   Neurological:      General: No focal deficit present.      Mental Status: He is alert and oriented to person, place, and time.   Psychiatric:         Mood and Affect: Mood normal.     Laboratory and imaging:   Latest Reference Range & Units 07/17/24 11:53   WBC 4.0 - 11.0 10e3/uL 12.2 (H)   Hemoglobin 13.3 - 17.7 g/dL 12.1 (L)   Hematocrit 40.0 - 53.0 % 35.4 (L)   Platelet Count 150 - 450 10e3/uL 401   (H): Data is abnormally high  (L): Data is abnormally low      ASSESSMENT AND PLAN    1.  Rectal cancer:      Initially presented with diarrhea in March 2024.  Sigmoidoscopy showed rectal mass infiltrative partially obstructing large mass 8 cm from the anal verge.  MRI pelvis showed carcinoma involving lower, middle and upper rectum extending over a length of roughly 12 cm.  Lesion involves the mesorectum but most significant invasion anterolaterally on the right at the level of the mid rectum.  Neoplasm is contiguous with the posterior margin of the right seminal vesicle but is not clearly invading it.  Extends roughly 1.5 cm beyond the rectal margin.  It also showed numerous enlarged mesorectal lymph nodes.  A inferior mesenteric lymph node was also noted.  There were also enlarged left iliac nodes and right iliac node.    He has already had a diverting ostomy by  on 6/6/2024.    Today he has seen medical oncology at [facility] and radiation oncology at [place].  He would like to transfer his care to him in his lives close by.  Recommendation today for treatment has been total neoadjuvant therapy    Discussed that I agree that he should proceed with total neoadjuvant chemotherapy.  Regarding the sequence of treatment I  do agree with radiation oncology that he should start with chemo first given the adenopathy seen on imaging.    We discussed recommendation for systemic therapy with FOLFOX versus CapeOx.  We discussed the logistics and side effects of both.  Following discussion plan was to proceed with FOLFOX every 2 weeks for a total of 8 cycles.  This will be 16 weeks of chemotherapy.    We will then refer him him back to radiation oncology for concurrent chemoRT.  Discussed chemotherapy during concurrent chemotherapy will be with capecitabine.    Following that he will be referred back to surgery for definitive treatment.    The informed consent process has occurred, as I have provided the patient with an explanation of their rectal cancer diagnosis as well as intention of treatment  I discussed the risks, benefits and alternatives to treatment. Expected side effects which include but not limited to nasuea, vomiting, diarrhea, low blood counts, infection, kidney or liver toxicity, allergic reactions, and acute neuropathy and a delayed sensory neuropathy. The patient was given the opportunity to ask questions, and their questions were answered. The patient has consented to treatment.    He already has a port therefore we will start treatment with 8 cycles of FOLFOX.    Mr. Mcbride has had a PET scan done at [facility].  The report was not available at the time of consultation.  We discussed that if there is more distant disease then currently seen on imaging then the plan would be systemic therapy with chemotherapy anyways.  Therefore we will get started on treatment as quickly as possible.  He did have a CT chest done 5/2/2024 with showed very small pulmonary nodules.  No other definite signs of metastatic disease.  But notes from [facility] was requested on tumor pathology and [facility] testing was sent.  Will try to get records.  He was also referred to genetics but has deferred genetic testing currently.    Total time spent on  the patient on day of encounter was 98 minutes doing chart review, review of outside records, review of test results, interpretation of results, patient visit and documentation.       Shawna Poon MD

## 2024-07-18 ENCOUNTER — DOCUMENTATION ONLY (OUTPATIENT)
Dept: INFUSION THERAPY | Facility: OTHER | Age: 69
End: 2024-07-18

## 2024-07-18 LAB — CEA SERPL-MCNC: 27.5 NG/ML

## 2024-07-19 NOTE — PROGRESS NOTES
[facility] Chemotherapy Consult    The inpatient pharmacist has been consulted to review the patient's chart for  the following: any significant drug interactions between home medications, new take home medications, pre-medications, PRN medications, chemotherapy agents, and chemotherapy regimen.     Current Outpatient Medications   Medication Sig Dispense Refill    acetaminophen (TYLENOL) 500 MG tablet Take 500-1,000 mg by mouth every 6 hours as needed for mild pain      aspirin 81 MG EC tablet Take 81 mg by mouth daily      atorvastatin (LIPITOR) 20 MG tablet Take 20 mg by mouth daily      hydrochlorothiazide (HYDRODIURIL) 25 MG tablet Take 25 mg by mouth daily      lisinopril (ZESTRIL) 40 MG tablet Take 40 mg by mouth daily      metFORMIN (GLUCOPHAGE) 1000 MG tablet Take 750 mg by mouth 2 times daily (with meals)      metFORMIN (GLUCOPHAGE-XR) 750 MG 24 hr tablet Take 750 mg by mouth 2 times daily (with meals)      multivitamin w/minerals (MULTI-VITAMIN) tablet Take 1 tablet by mouth daily      Oxymetazoline HCl (NASAL DECONGESTANT SPRAY NA) Spray in nostril as needed      Polyethylene Glycol 3350 (MIRALAX PO) Take 1 Capful by mouth daily      traZODone (DESYREL) 100 MG tablet Take 100 mg by mouth at bedtime       No current facility-administered medications for this visit.       Take home medication(s): Zofran, Compazine, Decadron    Pre-Treat(s): Emend, Decadron    PRN medication(s): Ativan, Pepcid    Chemotherapy agent(s): leucovorin, oxaliplatin, fluorouracil    Cancer Being Treated: rectal    The following interactions were found and will require patient education and/or provider assessment:     Drug-Drug interactions: Concurrent use of OXALIPLATIN and QT INTERVAL PROLONGING DRUGS such as ZOFRAN, COMPAZINE, and TRAZODONE may result in increased risk of QT-interval prolongation.     Drug-Allergy interactions:  None    Drug-Food interactions: Concurrent use of EMEND and GRAPEFRUIT JUICE may result  in increased EMEND exposure.     Drug-Ethanol interactions: None    Drug-Tobacco interactions: Concurrent use of ZOFRAN and TOBACCO may result in decreased ZOFRAN exposure.     If there are any additional questions or concerns, please contact the inpatient pharmacy ([phone]) for further review.     Renato Parr RPH  July 18, 2024

## 2024-07-21 ENCOUNTER — HEALTH MAINTENANCE LETTER (OUTPATIENT)
Age: 69
End: 2024-07-21

## 2024-07-23 ENCOUNTER — PATIENT OUTREACH (OUTPATIENT)
Dept: ONCOLOGY | Facility: OTHER | Age: 69
End: 2024-07-23

## 2024-07-23 ENCOUNTER — ALLIED HEALTH/NURSE VISIT (OUTPATIENT)
Dept: ONCOLOGY | Facility: OTHER | Age: 69
End: 2024-07-23
Attending: INTERNAL MEDICINE
Payer: COMMERCIAL

## 2024-07-23 ENCOUNTER — TELEPHONE (OUTPATIENT)
Dept: ONCOLOGY | Facility: OTHER | Age: 69
End: 2024-07-23

## 2024-07-23 DIAGNOSIS — C20 RECTAL CANCER (H): Primary | ICD-10-CM

## 2024-07-23 PROCEDURE — G0463 HOSPITAL OUTPT CLINIC VISIT: HCPCS

## 2024-07-23 RX ORDER — PROCHLORPERAZINE MALEATE 5 MG
5 TABLET ORAL EVERY 6 HOURS PRN
Qty: 90 TABLET | Refills: 0 | Status: SHIPPED | OUTPATIENT
Start: 2024-07-23

## 2024-07-23 RX ORDER — TRAMADOL HYDROCHLORIDE 50 MG/1
50 TABLET ORAL EVERY 6 HOURS PRN
Qty: 40 TABLET | Refills: 0 | Status: SHIPPED | OUTPATIENT
Start: 2024-07-23

## 2024-07-23 RX ORDER — DEXAMETHASONE 4 MG/1
8 TABLET ORAL DAILY
Qty: 4 TABLET | Refills: 2 | Status: SHIPPED | OUTPATIENT
Start: 2024-07-23 | End: 2024-08-06

## 2024-07-23 RX ORDER — ONDANSETRON 8 MG/1
8 TABLET, FILM COATED ORAL EVERY 8 HOURS PRN
Qty: 30 TABLET | Refills: 2 | Status: SHIPPED | OUTPATIENT
Start: 2024-07-23

## 2024-07-23 NOTE — PROGRESS NOTES
[facility]: Cancer Care Plan of Care Education Note                                    Discussion with Patient:                                                      Chemo education     Discussion of when to take steroids, and antinausea medications. Pain assessment completed and message sent to provider. Patients spouse requesting SW visit tomorrow. SW updated. New patient folder reviewed with patient.        Assessment:                                                      Assessment completed with:: Patient;Spouse or significant other    Plan of Care Education   Yearly learning assessment completed?: Yes (see Education tab)  Diagnosis:: rectal cancer  Does patient understand diagnosis?: Yes  Tx plan/regimen:: FOLFOX  Does patient understand treatment plan/regimen?: Yes  Preparing for treatment:: Reviewed treatment preparation information with patient (vascular access, day of chemo, visitor policy, what to bring, etc.)  Vascular access education provided for:: Port  Side effect education:: Diarrhea/Constipation;Fatigue;Hair loss;Infection;Lab value monitoring (anemia, neutropenia, thrombocytopenia);Mouth sores;Mylosuppression;Nausea/Vomiting;Neuropathy;Sexual health;Skin changes  Supportive services education provided for:: Cancer rehab;Social work  Safety/self care at home reviewed with patient:: Yes  Coping - concerns/fears reviewed with patient:: Yes  Plan of Care:: [facility]AL follow-up appointment;Lab appointment;Treatment schedule  When to call provider:: Bleeding;Increased shortness of breath;New/worsening pain;Shaking chills;Temperature >100.4F;Uncontrolled diarrhea/constipation;Uncontrolled nausea/vomiting  Reasons for deferring treatment reviewed with patient:: Yes  Additional education provided for: : Steroid therapy;Neutropenic precautions;Dental clearance/precautions;Bleeding precautions    Evaluation of Learning  Patient Education Provided: Yes  Readiness:: Acceptance  Method::  Booklet/Handout;Explanation  Response:: Verbalizes understanding    No assessment indicated    Intervention/Education provided during outreach:                                                       Skin care, discussed th    Patient to call/MyChart message with updates    Signature:  Nano Verma RN

## 2024-07-23 NOTE — PROGRESS NOTES
[facility]: Cancer Care                                                                                        Met with patient today for chemoeducation. See patient out reach encounter.       Signature:  Nano Verma RN

## 2024-07-23 NOTE — TELEPHONE ENCOUNTER
[facility]: Cancer Care                                                                                        Patient is experiencing extreme pain in the colon, causing him to violently shake when the pain hits him. He states that the pain is a 8/10. He is taking 650 mg of tylenol every 6 hours with no relief. The only relief that he has is when he lays down. Would you be willing to start him on something for pain relief. He also reports that he is having mucus that is sometimes bloody. He uses [facility] in [facility].       Signature:  Nano Verma RN

## 2024-07-24 ENCOUNTER — INFUSION THERAPY VISIT (OUTPATIENT)
Dept: INFUSION THERAPY | Facility: OTHER | Age: 69
End: 2024-07-24
Attending: INTERNAL MEDICINE
Payer: COMMERCIAL

## 2024-07-24 VITALS
DIASTOLIC BLOOD PRESSURE: 78 MMHG | SYSTOLIC BLOOD PRESSURE: 116 MMHG | HEIGHT: 69 IN | RESPIRATION RATE: 17 BRPM | WEIGHT: 158.2 LBS | BODY MASS INDEX: 23.43 KG/M2 | HEART RATE: 71 BPM

## 2024-07-24 DIAGNOSIS — C20 RECTAL CANCER (H): Primary | ICD-10-CM

## 2024-07-24 LAB
ALBUMIN SERPL BCG-MCNC: 4.1 G/DL (ref 3.5–5.2)
ALP SERPL-CCNC: 119 U/L (ref 40–150)
ALT SERPL W P-5'-P-CCNC: 15 U/L (ref 0–70)
ANION GAP SERPL CALCULATED.3IONS-SCNC: 15 MMOL/L (ref 7–15)
AST SERPL W P-5'-P-CCNC: 20 U/L (ref 0–45)
BASOPHILS # BLD AUTO: 0.1 10E3/UL (ref 0–0.2)
BASOPHILS NFR BLD AUTO: 1 %
BILIRUB SERPL-MCNC: 0.4 MG/DL
BUN SERPL-MCNC: 25 MG/DL (ref 8–23)
CALCIUM SERPL-MCNC: 9.6 MG/DL (ref 8.8–10.4)
CEA SERPL-MCNC: 24.2 NG/ML
CHLORIDE SERPL-SCNC: 97 MMOL/L (ref 98–107)
CREAT SERPL-MCNC: 1.07 MG/DL (ref 0.67–1.17)
EGFRCR SERPLBLD CKD-EPI 2021: 76 ML/MIN/1.73M2
EOSINOPHIL # BLD AUTO: 0.4 10E3/UL (ref 0–0.7)
EOSINOPHIL NFR BLD AUTO: 3 %
ERYTHROCYTE [DISTWIDTH] IN BLOOD BY AUTOMATED COUNT: 12.9 % (ref 10–15)
GLUCOSE SERPL-MCNC: 195 MG/DL (ref 70–99)
HCO3 SERPL-SCNC: 24 MMOL/L (ref 22–29)
HCT VFR BLD AUTO: 33.9 % (ref 40–53)
HGB BLD-MCNC: 11.9 G/DL (ref 13.3–17.7)
IMM GRANULOCYTES # BLD: 0.1 10E3/UL
IMM GRANULOCYTES NFR BLD: 0 %
LYMPHOCYTES # BLD AUTO: 1.9 10E3/UL (ref 0.8–5.3)
LYMPHOCYTES NFR BLD AUTO: 14 %
MCH RBC QN AUTO: 30.7 PG (ref 26.5–33)
MCHC RBC AUTO-ENTMCNC: 35.1 G/DL (ref 31.5–36.5)
MCV RBC AUTO: 88 FL (ref 78–100)
MONOCYTES # BLD AUTO: 1 10E3/UL (ref 0–1.3)
MONOCYTES NFR BLD AUTO: 7 %
NEUTROPHILS # BLD AUTO: 10.7 10E3/UL (ref 1.6–8.3)
NEUTROPHILS NFR BLD AUTO: 76 %
NRBC # BLD AUTO: 0 10E3/UL
NRBC BLD AUTO-RTO: 0 /100
PLATELET # BLD AUTO: 380 10E3/UL (ref 150–450)
POTASSIUM SERPL-SCNC: 4.3 MMOL/L (ref 3.4–5.3)
PROT SERPL-MCNC: 7.1 G/DL (ref 6.4–8.3)
RBC # BLD AUTO: 3.87 10E6/UL (ref 4.4–5.9)
SODIUM SERPL-SCNC: 136 MMOL/L (ref 135–145)
WBC # BLD AUTO: 14.1 10E3/UL (ref 4–11)

## 2024-07-24 PROCEDURE — 36415 COLL VENOUS BLD VENIPUNCTURE: CPT | Performed by: INTERNAL MEDICINE

## 2024-07-24 PROCEDURE — 96375 TX/PRO/DX INJ NEW DRUG ADDON: CPT

## 2024-07-24 PROCEDURE — 36591 DRAW BLOOD OFF VENOUS DEVICE: CPT | Performed by: INTERNAL MEDICINE

## 2024-07-24 PROCEDURE — 96413 CHEMO IV INFUSION 1 HR: CPT

## 2024-07-24 PROCEDURE — 96521 REFILL/MAINT PORTABLE PUMP: CPT | Mod: XU

## 2024-07-24 PROCEDURE — 82378 CARCINOEMBRYONIC ANTIGEN: CPT | Mod: ZL

## 2024-07-24 PROCEDURE — 96367 TX/PROPH/DG ADDL SEQ IV INF: CPT

## 2024-07-24 PROCEDURE — 96415 CHEMO IV INFUSION ADDL HR: CPT

## 2024-07-24 PROCEDURE — 96409 CHEMO IV PUSH SNGL DRUG: CPT

## 2024-07-24 PROCEDURE — 250N000011 HC RX IP 250 OP 636: Mod: JZ | Performed by: INTERNAL MEDICINE

## 2024-07-24 PROCEDURE — 80053 COMPREHEN METABOLIC PANEL: CPT | Mod: ZL | Performed by: INTERNAL MEDICINE

## 2024-07-24 PROCEDURE — 258N000003 HC RX IP 258 OP 636: Performed by: INTERNAL MEDICINE

## 2024-07-24 PROCEDURE — 96411 CHEMO IV PUSH ADDL DRUG: CPT

## 2024-07-24 PROCEDURE — 85025 COMPLETE CBC W/AUTO DIFF WBC: CPT | Mod: ZL | Performed by: INTERNAL MEDICINE

## 2024-07-24 RX ORDER — LIDOCAINE/PRILOCAINE 2.5 %-2.5%
CREAM (GRAM) TOPICAL DAILY PRN
Qty: 30 G | Refills: 1 | Status: SHIPPED | OUTPATIENT
Start: 2024-07-24

## 2024-07-24 RX ORDER — FLUOROURACIL 50 MG/ML
400 INJECTION, SOLUTION INTRAVENOUS ONCE
Status: COMPLETED | OUTPATIENT
Start: 2024-07-24 | End: 2024-07-24

## 2024-07-24 RX ORDER — ONDANSETRON 2 MG/ML
8 INJECTION INTRAMUSCULAR; INTRAVENOUS ONCE
Status: COMPLETED | OUTPATIENT
Start: 2024-07-24 | End: 2024-07-24

## 2024-07-24 RX ADMIN — DEXTROSE MONOHYDRATE 250 ML: 50 INJECTION, SOLUTION INTRAVENOUS at 10:12

## 2024-07-24 RX ADMIN — LEUCOVORIN CALCIUM 750 MG: 200 INJECTION, POWDER, LYOPHILIZED, FOR SOLUTION INTRAMUSCULAR; INTRAVENOUS at 11:08

## 2024-07-24 RX ADMIN — FOSAPREPITANT: 150 INJECTION, POWDER, LYOPHILIZED, FOR SOLUTION INTRAVENOUS at 10:14

## 2024-07-24 RX ADMIN — FLUOROURACIL 755 MG: 50 INJECTION, SOLUTION INTRAVENOUS at 13:25

## 2024-07-24 RX ADMIN — ONDANSETRON 8 MG: 2 INJECTION INTRAMUSCULAR; INTRAVENOUS at 10:18

## 2024-07-24 RX ADMIN — OXALIPLATIN 150 MG: 100 INJECTION, SOLUTION, CONCENTRATE INTRAVENOUS at 11:11

## 2024-07-24 ASSESSMENT — PAIN SCALES - GENERAL: PAINLEVEL: SEVERE PAIN (6)

## 2024-07-24 NOTE — PROGRESS NOTES
Infusion Nursing Note:  Tal Mcbride presents today for Folfox C1D1.    Patient seen by provider today: No   present during visit today: Not Applicable.    Note: Patient education provided about chemo therapy, expectations, and anticipated needs.      Intravenous Access:  Implanted Port.    Treatment Conditions:  Lab Results   Component Value Date    HGB 11.9 (L) 07/24/2024    WBC 14.1 (H) 07/24/2024    ANEUTAUTO 10.7 (H) 07/24/2024     07/24/2024        Lab Results   Component Value Date     07/24/2024    POTASSIUM 4.3 07/24/2024    CR 1.07 07/24/2024    RAUDEL 9.6 07/24/2024    BILITOTAL 0.4 07/24/2024    ALBUMIN 4.1 07/24/2024    ALT 15 07/24/2024    AST 20 07/24/2024       Results reviewed, labs MET treatment parameters, ok to proceed with treatment.      Post Infusion Assessment:  Patient tolerated infusion without incident.  Blood return noted pre and post infusion.  Site patent and intact, free from redness, edema or discomfort.  No evidence of extravasations.  Access discontinued per protocol.       Discharge Plan:   Prescription refills given for emla cream for port access.  Discharge instructions reviewed with: Patient and spouse.  Patient and/or family verbalized understanding of discharge instructions and all questions answered.  AVS to patient via InformedDNAT.  Patient will return Thursday  for next appointment.   Patient discharged in stable condition accompanied by: wife.  Departure Mode: Ambulatory.  PT referral teed up for the provider as he offers complaints of fears of falling patient wishes to get PT at [facility] or closer to home.      Citlalli Reis RN

## 2024-07-25 NOTE — PROGRESS NOTES
Oncology Follow-up Visit:  July 29, 2024    Diagnosis:  Rectal cancer (H)    History Of Present Illness:  Mr. Mcbride is a 68 year old male is here for follow-up to discuss recent PET/CT results and treatment plan moving forward. 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  This mass is noted 8 cm from the anal verge involving the low, middle and upper rectum extending over a length of roughly 12 cm.  Involving the mesorectum with significant invasion anterior laterally on the right at the level of the mid rectum.  It is contiguous with the posterior margin of the right seminal vesicle but is not clearly invaded.  There is mesorectal adenopathy.  To date he has met with surgery in which the discussion included a diverting loop colostomy versus a permanent colostomy.  Patient underwent a PET CT which demonstrates a masslike thickening with associated FDG avidity throughout the rectum consistent with given history.  Metastatic disease within the liver, retroperitoneal nodes, perirectal nodes and inguinal nodes.  There are findings concerning for left UPJ  obstruction.  To date patient has discussed with medical oncology the ongoing plan would be upfront chemotherapy, and radiation down the line if needed.  History as below    History as below     4/23/2024 CT abdomen with and without contrast: There is a 6.7 cm circumscribed fluid density lesion in the left renal pelvis consistent with marked pyelo calyceal distention.  There is marked mural thickening involving the distal sigmoid colon and rectum, recommend direct visualization.     4/29/2024 sigmoidoscopy operative note: The digital rectal exam revealed a soft rectal mass palpated 8 cm from the anal verge.  The mass was noncircumferential and located predominantly at the posterior bowel wall.  An infiltrative partially obstructing large mass was found in the rectum.  The mass was circumferential.  The mass measures 4 cm in  length.  No bleeding present.     4/29/2024 duodenum, biopsy: No significant pathologic abnormality.  Stomach, biopsy: No significant pathologic abnormality.  Rectal mass, 8 cm from anal verge, biopsy: Moderately to poorly differentiated adenocarcinoma, ulcerated.  There is focal submucosal spread of the tumor involving a fragment with benign nonneoplastic overlying colonic mucosa.  The pattern raises concern for lymphatic invasion.  Depth of invasion cannot be determined.  There is no definite adenomatous polyp in the background.     5/2/2024 CT chest with contrast: No definite findings for metastatic disease in the chest.  2 small pulmonary nodules measuring up to 4 mm unchanged from prior study     5/2/2024 MR pelvis with and without contrast: Rectal carcinoma involving the lower, middle and upper thirds, stage T3c N2b.  A carcinoma involving the low, middle and upper rectum extending over a length of roughly 12 cm is demonstrated.  The lesion involves the mesorectum with significant invasion anterolaterally on the right at the level of the mid rectum.  At this site infiltrative neoplasm is contiguous with the posterior margin of the right seminal vesicle but is not clearly invaded.  Neoplasm extends roughly 1.5 cm beyond the rectal margin.  There are numerous enlarged mesorectal lymph nodes demonstrated.     5/13/2024 medical oncology consultation: Discussed plan per NCCN guidelines to include total neoadjuvant chemotherapy plus or minus radiation for 6 months followed by potential surgery.     5/29/2024 surgery consult: Discussed diverting loop colostomy versus permanent colostomy.  Wife is in favor of a permanent colostomy.  Patient to decide.     5/30/2024 CT abdomen pelvis: Large infiltrative rectal mass involving the upper middle and lower rectum.  There is surrounding inflammatory change and pelvic free fluid.  There is adjacent mesorectal adenopathy a left internal iliac node as well.  Enlarged prostate  gland.  Left probable UPJ obstruction.    7/16/2024 PET/CT: Masslike thickening with associated FDG avidity throughout the rectum is consistent with known history.  Metastatic disease within the liver, retroperitoneal nodes, perirectal nodes and inguinal nodes.  Findings are concerning for left UPJ obstruction.    7/17/2024 medical oncology visit: Plan is to proceed with total neoadjuvant chemotherapy, recommendation for FOLFOX every 2 weeks for a total of 8 cycles, followed by current chemoRT.      Review Of Systems:  /60 (BP Location: Right arm, Patient Position: Chair, Cuff Size: Adult Regular)   Pulse 64   Temp 97.8  F (36.6  C) (Tympanic)   Resp 18   SpO2 98%     Past medical, social, surgical, and family histories reviewed.    Allergies:  Allergies as of 07/29/2024    (No Known Allergies)       Current Medications:  Current Outpatient Medications   Medication Sig Dispense Refill    acetaminophen (TYLENOL) 500 MG tablet Take 650 mg by mouth every 6 hours as needed for mild pain      aspirin 81 MG EC tablet Take 81 mg by mouth daily      atorvastatin (LIPITOR) 20 MG tablet Take 20 mg by mouth daily      dexAMETHasone (DECADRON) 4 MG tablet Take 2 tablets (8 mg) by mouth daily Take for 2 days, starting the day after chemo. Take with food. 4 tablet 2    hydrochlorothiazide (HYDRODIURIL) 25 MG tablet Take 25 mg by mouth daily      lidocaine-prilocaine (EMLA) 2.5-2.5 % external cream Apply topically daily as needed for moderate pain 30 g 1    lisinopril (ZESTRIL) 40 MG tablet Take 40 mg by mouth daily      metFORMIN (GLUCOPHAGE-XR) 750 MG 24 hr tablet Take 750 mg by mouth 2 times daily (with meals)      multivitamin w/minerals (MULTI-VITAMIN) tablet Take 1 tablet by mouth daily      ondansetron (ZOFRAN) 8 MG tablet Take 1 tablet (8 mg) by mouth every 8 hours as needed for nausea (vomiting) 30 tablet 2    Oxymetazoline HCl (NASAL DECONGESTANT SPRAY NA) Spray in nostril as needed      Polyethylene Glycol  3350 (MIRALAX PO) Take 1 Capful by mouth daily      prochlorperazine (COMPAZINE) 5 MG tablet Take 1 tablet (5 mg) by mouth every 6 hours as needed for nausea or vomiting 90 tablet 0    traZODone (DESYREL) 100 MG tablet Take 100 mg by mouth at bedtime      metFORMIN (GLUCOPHAGE) 1000 MG tablet Take 750 mg by mouth 2 times daily (with meals) (Patient not taking: Reported on 7/23/2024)      traMADol (ULTRAM) 50 MG tablet Take 1 tablet (50 mg) by mouth every 6 hours as needed for severe pain (Patient not taking: Reported on 7/23/2024) 40 tablet 0        Physical Exam:  /60 (BP Location: Right arm, Patient Position: Chair, Cuff Size: Adult Regular)   Pulse 64   Temp 97.8  F (36.6  C) (Tympanic)   Resp 18   SpO2 98%     GENERAL APPEARANCE: healthy, alert and no distress     RESP: respirations non labored     ABDOMEN: non distended      MUSCULOSKELETAL: extremities normal- no gross deformities noted, no evidence of inflammation in joints, FROM in all extremities. No edema b/l LE.     SKIN: no suspicious lesions or rashes     PSYCHIATRIC: mentation appears normal and affect normal    Laboratory/Imaging Studies  Infusion Therapy Visit on 07/24/2024   Component Date Value Ref Range Status    CEA 07/24/2024 24.2  ng/mL Final    Nonsmoker (past/never) <=5.0 ng/mL   Current smoker <=6.5 ng/mL     This result is obtained using the Roche Elecsys CEA method on the tangela e801 immunoassay analyzer. Results obtained with different assay methods or kits cannot be used interchangeably.    Sodium 07/24/2024 136  135 - 145 mmol/L Final    Potassium 07/24/2024 4.3  3.4 - 5.3 mmol/L Final    Carbon Dioxide (CO2) 07/24/2024 24  22 - 29 mmol/L Final    Anion Gap 07/24/2024 15  7 - 15 mmol/L Final    Urea Nitrogen 07/24/2024 25.0 (H)  8.0 - 23.0 mg/dL Final    Creatinine 07/24/2024 1.07  0.67 - 1.17 mg/dL Final    GFR Estimate 07/24/2024 76  >60 mL/min/1.73m2 Final    eGFR calculated using 2021 CKD-EPI equation.    Calcium 07/24/2024  9.6  8.8 - 10.4 mg/dL Final    Reference intervals for this test were updated on 7/16/2024 to reflect our healthy population more accurately. There may be differences in the flagging of prior results with similar values performed with this method. Those prior results can be interpreted in the context of the updated reference intervals.    Chloride 07/24/2024 97 (L)  98 - 107 mmol/L Final    Glucose 07/24/2024 195 (H)  70 - 99 mg/dL Final    Alkaline Phosphatase 07/24/2024 119  40 - 150 U/L Final    AST 07/24/2024 20  0 - 45 U/L Final    ALT 07/24/2024 15  0 - 70 U/L Final    Protein Total 07/24/2024 7.1  6.4 - 8.3 g/dL Final    Albumin 07/24/2024 4.1  3.5 - 5.2 g/dL Final    Bilirubin Total 07/24/2024 0.4  <=1.2 mg/dL Final    WBC Count 07/24/2024 14.1 (H)  4.0 - 11.0 10e3/uL Final    RBC Count 07/24/2024 3.87 (L)  4.40 - 5.90 10e6/uL Final    Hemoglobin 07/24/2024 11.9 (L)  13.3 - 17.7 g/dL Final    Hematocrit 07/24/2024 33.9 (L)  40.0 - 53.0 % Final    MCV 07/24/2024 88  78 - 100 fL Final    MCH 07/24/2024 30.7  26.5 - 33.0 pg Final    MCHC 07/24/2024 35.1  31.5 - 36.5 g/dL Final    RDW 07/24/2024 12.9  10.0 - 15.0 % Final    Platelet Count 07/24/2024 380  150 - 450 10e3/uL Final    % Neutrophils 07/24/2024 76  % Final    % Lymphocytes 07/24/2024 14  % Final    % Monocytes 07/24/2024 7  % Final    % Eosinophils 07/24/2024 3  % Final    % Basophils 07/24/2024 1  % Final    % Immature Granulocytes 07/24/2024 0  % Final    NRBCs per 100 WBC 07/24/2024 0  <1 /100 Final    Absolute Neutrophils 07/24/2024 10.7 (H)  1.6 - 8.3 10e3/uL Final    Absolute Lymphocytes 07/24/2024 1.9  0.8 - 5.3 10e3/uL Final    Absolute Monocytes 07/24/2024 1.0  0.0 - 1.3 10e3/uL Final    Absolute Eosinophils 07/24/2024 0.4  0.0 - 0.7 10e3/uL Final    Absolute Basophils 07/24/2024 0.1  0.0 - 0.2 10e3/uL Final    Absolute Immature Granulocytes 07/24/2024 0.1  <=0.4 10e3/uL Final    Absolute NRBCs 07/24/2024 0.0  10e3/uL Final   Oncology Visit  on 07/17/2024   Component Date Value Ref Range Status    Sodium 07/17/2024 133 (L)  135 - 145 mmol/L Final    Potassium 07/17/2024 4.4  3.4 - 5.3 mmol/L Final    Carbon Dioxide (CO2) 07/17/2024 28  22 - 29 mmol/L Final    Anion Gap 07/17/2024 9  7 - 15 mmol/L Final    Urea Nitrogen 07/17/2024 27.6 (H)  8.0 - 23.0 mg/dL Final    Creatinine 07/17/2024 1.01  0.67 - 1.17 mg/dL Final    GFR Estimate 07/17/2024 81  >60 mL/min/1.73m2 Final    eGFR calculated using 2021 CKD-EPI equation.    Calcium 07/17/2024 9.7  8.8 - 10.4 mg/dL Final    Reference intervals for this test were updated on 7/16/2024 to reflect our healthy population more accurately. There may be differences in the flagging of prior results with similar values performed with this method. Those prior results can be interpreted in the context of the updated reference intervals.    Chloride 07/17/2024 96 (L)  98 - 107 mmol/L Final    Glucose 07/17/2024 154 (H)  70 - 99 mg/dL Final    Alkaline Phosphatase 07/17/2024 145  40 - 150 U/L Final    AST 07/17/2024 17  0 - 45 U/L Final    ALT 07/17/2024 22  0 - 70 U/L Final    Protein Total 07/17/2024 7.6  6.4 - 8.3 g/dL Final    Albumin 07/17/2024 4.2  3.5 - 5.2 g/dL Final    Bilirubin Total 07/17/2024 0.4  <=1.2 mg/dL Final    CEA 07/17/2024 27.5  ng/mL Final    Nonsmoker (past/never) <=5.0 ng/mL   Current smoker <=6.5 ng/mL     This result is obtained using the Roche Elecsys CEA method on the tangela e801 immunoassay analyzer. Results obtained with different assay methods or kits cannot be used interchangeably.    WBC Count 07/17/2024 12.2 (H)  4.0 - 11.0 10e3/uL Final    RBC Count 07/17/2024 3.98 (L)  4.40 - 5.90 10e6/uL Final    Hemoglobin 07/17/2024 12.1 (L)  13.3 - 17.7 g/dL Final    Hematocrit 07/17/2024 35.4 (L)  40.0 - 53.0 % Final    MCV 07/17/2024 89  78 - 100 fL Final    MCH 07/17/2024 30.4  26.5 - 33.0 pg Final    MCHC 07/17/2024 34.2  31.5 - 36.5 g/dL Final    RDW 07/17/2024 12.9  10.0 - 15.0 % Final     Platelet Count 07/17/2024 401  150 - 450 10e3/uL Final    % Neutrophils 07/17/2024 72  % Final    % Lymphocytes 07/17/2024 16  % Final    % Monocytes 07/17/2024 8  % Final    % Eosinophils 07/17/2024 3  % Final    % Basophils 07/17/2024 1  % Final    % Immature Granulocytes 07/17/2024 1  % Final    NRBCs per 100 WBC 07/17/2024 0  <1 /100 Final    Absolute Neutrophils 07/17/2024 8.8 (H)  1.6 - 8.3 10e3/uL Final    Absolute Lymphocytes 07/17/2024 2.0  0.8 - 5.3 10e3/uL Final    Absolute Monocytes 07/17/2024 0.9  0.0 - 1.3 10e3/uL Final    Absolute Eosinophils 07/17/2024 0.4  0.0 - 0.7 10e3/uL Final    Absolute Basophils 07/17/2024 0.1  0.0 - 0.2 10e3/uL Final    Absolute Immature Granulocytes 07/17/2024 0.1  <=0.4 10e3/uL Final    Absolute NRBCs 07/17/2024 0.0  10e3/uL Final        ASSESSMENT/PLAN: Had a thorough discussion with Ezra about results of his PET/CT. A friend was present to help with information as his wife was not available. I believe he understands now about the treatment plan of primary chemotherapy with radiation for palliative treatments as needed.  He also seemed to be having some medication issues to his chemotherapy, se he was sent to Medical Oncology to review things.    He understands that we are available if he needs us.

## 2024-07-26 ENCOUNTER — INFUSION THERAPY VISIT (OUTPATIENT)
Dept: INFUSION THERAPY | Facility: OTHER | Age: 69
End: 2024-07-26
Attending: INTERNAL MEDICINE
Payer: COMMERCIAL

## 2024-07-26 VITALS — HEART RATE: 56 BPM | DIASTOLIC BLOOD PRESSURE: 71 MMHG | SYSTOLIC BLOOD PRESSURE: 120 MMHG

## 2024-07-26 DIAGNOSIS — C20 RECTAL CANCER (H): Primary | ICD-10-CM

## 2024-07-26 PROCEDURE — 96523 IRRIG DRUG DELIVERY DEVICE: CPT

## 2024-07-26 NOTE — PROGRESS NOTES
Infusion Nursing Note:  Tal Mcbride presents today for Pump off .    Patient seen by provider today: No   present during visit today: Not Applicable.    Note: Patient reports having mild nausea and has been taking his anti emetics at home.  Patient reports mild effectiveness, patient pain under better control.  Patient offers having had a couple of headaches over the last few days, he notices that this is only after he torques his head to right. Patient has a significant right sided lean, PT referral placed in previous visit.    Patient offers no other concerns.    Patient also reported he was taking his dex.  Patient reported he has been having some hard stools passing through his stoma.  Patient advised to watch for no stool and any acute abdominal pain.       Intravenous Access:  Implanted Port.    Treatment Conditions:  Lab Results   Component Value Date    HGB 11.9 (L) 07/24/2024    WBC 14.1 (H) 07/24/2024    ANEUTAUTO 10.7 (H) 07/24/2024     07/24/2024        Lab Results   Component Value Date     07/24/2024    POTASSIUM 4.3 07/24/2024    CR 1.07 07/24/2024    RAUDEL 9.6 07/24/2024    BILITOTAL 0.4 07/24/2024    ALBUMIN 4.1 07/24/2024    ALT 15 07/24/2024    AST 20 07/24/2024       Results reviewed, labs MET treatment parameters, ok to proceed with treatment.      Post Infusion Assessment:  Site patent and intact, free from redness, edema or discomfort.  No evidence of extravasations.  Access discontinued per protocol.  Patient pump disconnected, pump placed in hazardous bag and sent back to [facility] via mail.       Discharge Plan:   Patient declined prescription refills.  Patient and/or family verbalized understanding of discharge instructions and all questions answered.  AVS to patient via [email or web address].  Patient will return per calendar  for next appointment.   Patient discharged in stable condition accompanied by: self and friend.  Departure Mode: Ambulatory.      Citlalli Reis RN

## 2024-07-29 ENCOUNTER — ONCOLOGY VISIT (OUTPATIENT)
Dept: RADIATION ONCOLOGY | Facility: HOSPITAL | Age: 69
End: 2024-07-29
Attending: RADIOLOGY
Payer: COMMERCIAL

## 2024-07-29 VITALS
OXYGEN SATURATION: 98 % | HEART RATE: 64 BPM | TEMPERATURE: 97.8 F | DIASTOLIC BLOOD PRESSURE: 60 MMHG | RESPIRATION RATE: 18 BRPM | SYSTOLIC BLOOD PRESSURE: 110 MMHG

## 2024-07-29 DIAGNOSIS — C20 RECTAL CANCER (H): Primary | ICD-10-CM

## 2024-07-29 PROCEDURE — G0463 HOSPITAL OUTPT CLINIC VISIT: HCPCS | Mod: 25 | Performed by: RADIOLOGY

## 2024-07-29 ASSESSMENT — PAIN SCALES - GENERAL: PAINLEVEL: EXTREME PAIN (8)

## 2024-07-30 ENCOUNTER — PATIENT OUTREACH (OUTPATIENT)
Dept: ONCOLOGY | Facility: OTHER | Age: 69
End: 2024-07-30

## 2024-07-30 ENCOUNTER — CARE COORDINATION (OUTPATIENT)
Dept: BEHAVIORAL HEALTH | Facility: OTHER | Age: 69
End: 2024-07-30

## 2024-07-30 ENCOUNTER — ONCOLOGY VISIT (OUTPATIENT)
Dept: ONCOLOGY | Facility: OTHER | Age: 69
End: 2024-07-30
Attending: INTERNAL MEDICINE
Payer: COMMERCIAL

## 2024-07-30 VITALS
HEIGHT: 69 IN | HEART RATE: 60 BPM | RESPIRATION RATE: 20 BRPM | OXYGEN SATURATION: 99 % | BODY MASS INDEX: 22.86 KG/M2 | TEMPERATURE: 98.6 F | WEIGHT: 154.32 LBS | DIASTOLIC BLOOD PRESSURE: 60 MMHG | SYSTOLIC BLOOD PRESSURE: 120 MMHG

## 2024-07-30 DIAGNOSIS — C20 RECTAL CANCER (H): Primary | ICD-10-CM

## 2024-07-30 PROCEDURE — 99417 PROLNG OP E/M EACH 15 MIN: CPT | Performed by: INTERNAL MEDICINE

## 2024-07-30 PROCEDURE — 99215 OFFICE O/P EST HI 40 MIN: CPT | Performed by: INTERNAL MEDICINE

## 2024-07-30 PROCEDURE — G0463 HOSPITAL OUTPT CLINIC VISIT: HCPCS

## 2024-07-30 RX ORDER — LOPERAMIDE HYDROCHLORIDE 2 MG/1
2 TABLET ORAL 4 TIMES DAILY PRN
Qty: 90 TABLET | Refills: 0 | Status: SHIPPED | OUTPATIENT
Start: 2024-07-30

## 2024-07-30 RX ORDER — OXYCODONE AND ACETAMINOPHEN 5; 325 MG/1; MG/1
1 TABLET ORAL EVERY 6 HOURS PRN
Qty: 120 TABLET | Refills: 0 | Status: SHIPPED | OUTPATIENT
Start: 2024-07-30 | End: 2024-08-29

## 2024-07-30 ASSESSMENT — PAIN SCALES - GENERAL: PAINLEVEL: EXTREME PAIN (9)

## 2024-07-30 NOTE — PROGRESS NOTES
MEDICAL ONCOLOGY FOLLOW-UP NOTE  Jul 30, 2024    Reason for follow-up: Rectal cancer    HISTORY OF PRESENT ILLNESS  Tal Mcbride is a 68 year old male with PMH as stated below who is seen in the oncology clinic for rectal cancer.    His history in short is as follows:    Mr. Mcbride started having diarrhea in the end of March 2024.  He was evaluated by his primary care and recommended to undergo a colonoscopy and upper endoscopy.    4/29/2024: EGD: 3 cm hiatal hernia.  Normal stomach.  Normal third portion of the duodenum.  Nonobstructing Schatzki's ring.    4/29/2024: Sigmoidoscopy: The digital rectal exam revealed a soft tissue mass palpated 8 cm from the anal verge.  The mass was noncircumferential and located predominantly at the posterior bowel wall.  An infiltrative partially obstructing large mass was found in the rectum.  The mass was circumferential.  The mass measured 4 cm in length.  No bleeding was present.  This was biopsied.  A pediatric colonoscope was passed past the area but patient had a large quantity of stool present just above the stricture so colonoscopy was not able to be completed.    Biopsy of the rectal mass showed moderately to poorly differentiated adenocarcinoma.    5/2/2024 CT chest with contrast: No definite findings for metastatic disease in the chest. 2 small pulmonary nodules measuring up to 4 mm unchanged from prior study     5/2/2024: MRI pelvis with and without contrast: A carcinoma involving the lower, middle and upper rectum extending over a length of roughly 12 cm demonstrated.  This lesion involves the mesorectum with no significant invasion anterolaterally on the right at the level of the mid rectum.  At this site infiltrative neoplasm is contactless with the posterior margin of the right seminal vesicle but it is not clearly invaded.  Neoplasm extends roughly 1.5 cm beyond the rectal margin.  Numerous enlarged mesorectal lymph nodes are demonstrated.  The larger mesorectal  lymph node is demonstrated on the right measuring 1.8X 1.5 cm.  Additional mesorectal lymph nodes with short axis measurements at least 1 cm demonstrated.  Inferior mesenteric lymph nodes with short axis measurement of 1.1 cm X 1.0 cm are noted.  A left internal iliac node measuring 1.2X 0.8 cm is demonstrated.  A 2.7 X1.8 centimeter right iliac node is demonstrated.  The other visualized bowel is unremarkable.  The prostate is mildly enlarged.  No free fluid is demonstrated.    5/13/2024: CT abdomen and pelvis without contrast: Large infiltrative rectal mass involving the upper middle and lower rectum.  There is surrounding inflammatory change and pelvic free fluid.  There is adjacent mesorectal adenopathy, a left internal iliac node as well.  Large amount of stool throughout the colon.  Enlarged prostate gland.  Left probable UPJ obstruction.    6/6/2024: Diverting colostomy    7/24/2024: Cycle 1 of FOLFOX    7/25/2024: PET scan: Masslike thickening with associated FDG avidity throughout the rectum is consistent with given history.  Metastatic disease within the liver, retroperitoneal nodes, perirectal nodes and inguinal nodes.  Findings are concerning for a UPJ obstruction.  Nuclear medicine renal Lasix scan for further characterize.    Interim history:    Mr. Mcbride is doing well today.  However over the weekend he had significant nausea.  No vomiting.  Nausea worsened on Saturday and then improved on Sunday and then worsened again on Monday.  He also had loose stools from his ostomy.  It also noticed more mucus discharge.  Denies any abdominal pain.  Currently he is having significant pain in his rectum.  Tramadol is not controlling his pain.      REVIEW OF SYSTEMS  A 12-point ROS negative except as in HPI      Current Outpatient Medications   Medication Sig Dispense Refill    acetaminophen (TYLENOL) 500 MG tablet Take 650 mg by mouth every 6 hours as needed for mild pain      aspirin 81 MG EC tablet Take 81 mg  by mouth daily      atorvastatin (LIPITOR) 20 MG tablet Take 20 mg by mouth daily      dexAMETHasone (DECADRON) 4 MG tablet Take 2 tablets (8 mg) by mouth daily Take for 2 days, starting the day after chemo. Take with food. 4 tablet 2    hydrochlorothiazide (HYDRODIURIL) 25 MG tablet Take 25 mg by mouth daily      lidocaine-prilocaine (EMLA) 2.5-2.5 % external cream Apply topically daily as needed for moderate pain 30 g 1    lisinopril (ZESTRIL) 40 MG tablet Take 40 mg by mouth daily      metFORMIN (GLUCOPHAGE-XR) 750 MG 24 hr tablet Take 750 mg by mouth 2 times daily (with meals)      multivitamin w/minerals (MULTI-VITAMIN) tablet Take 1 tablet by mouth daily      ondansetron (ZOFRAN) 8 MG tablet Take 1 tablet (8 mg) by mouth every 8 hours as needed for nausea (vomiting) 30 tablet 2    Polyethylene Glycol 3350 (MIRALAX PO) Take 1 Capful by mouth daily      prochlorperazine (COMPAZINE) 5 MG tablet Take 1 tablet (5 mg) by mouth every 6 hours as needed for nausea or vomiting 90 tablet 0    traMADol (ULTRAM) 50 MG tablet Take 1 tablet (50 mg) by mouth every 6 hours as needed for severe pain 40 tablet 0    traZODone (DESYREL) 100 MG tablet Take 100 mg by mouth at bedtime      metFORMIN (GLUCOPHAGE) 1000 MG tablet Take 750 mg by mouth 2 times daily (with meals) (Patient not taking: Reported on 7/23/2024)      Oxymetazoline HCl (NASAL DECONGESTANT SPRAY NA) Spray in nostril as needed (Patient not taking: Reported on 7/30/2024)         No Known Allergies  Immunization History   Administered Date(s) Administered    COVID-19 Bivalent 12+ (Pfizer) 10/27/2022    COVID-19 Monovalent 18+ (Moderna) 01/16/2021, 02/13/2021, 11/29/2021    Hepatitis B, Adult 10/03/2013, 03/28/2014, 12/30/2014    Influenza Vaccine (Flucelvax Quadrivalent) 10/07/2022    Pneumococcal 23 valent 10/03/2013    TDAP (Adacel,Boostrix) 10/03/2012       Past Medical History:   Diagnosis Date    Adolescent idiopathic scoliosis of thoracolumbar region 11/16/2017  "   Bunion 03/28/2013    Essential hypertension 03/05/2012    Formatting of this note might be different from the original.   Epic      Pure hypercholesterolemia 10/04/2012    Rectal cancer (H) 05/29/2024    Severe protein-calorie malnutrition (H24) 06/07/2024    Type 2 diabetes mellitus without complication (H) 06/21/2012       Past Surgical History:   Procedure Laterality Date    CATARACT EXTRACTION W/ INTRAOCULAR LENS IMPLANT Left 2021    ENDOSCOPY N/A     upper and lower    GI SURGERY      PHACOEMULSIFICATION WITH STANDARD INTRAOCULAR LENS IMPLANT Right 09/29/2021    Procedure: right Cataract Extraction with Implant;  Surgeon: Juan Prado MD;  Location: [facility]    robotic colostomy N/A 06/06/2024       SOCIAL HISTORY  History   Smoking Status    Former    Types: Cigarettes   Smokeless Tobacco    Former    Social History    Substance and Sexual Activity      Alcohol use: Not Currently     History   Drug Use Unknown       FAMILY HISTORY  Family History   Problem Relation Age of Onset    Pancreatic Cancer Father 55       PHYSICAL EXAMINATION  /60   Pulse 60   Temp 98.6  F (37  C) (Tympanic)   Resp 20   Ht 1.753 m (5' 9\")   Wt 70 kg (154 lb 5.2 oz)   SpO2 99%   BMI 22.79 kg/m    Wt Readings from Last 2 Encounters:   07/30/24 70 kg (154 lb 5.2 oz)   07/24/24 71.8 kg (158 lb 3.2 oz)     Physical Exam  Constitutional:       Appearance: Normal appearance.   Abdominal:      General: Abdomen is flat.      Comments: Seen protruding from ostomy.  Red in color.    Neurological:      General: No focal deficit present.      Mental Status: He is alert and oriented to person, place, and time.   Psychiatric:         Mood and Affect: Mood normal.     Laboratory and imaging:     Latest Reference Range & Units 07/24/24 09:02   WBC 4.0 - 11.0 10e3/uL 14.1 (H)   Hemoglobin 13.3 - 17.7 g/dL 11.9 (L)   Hematocrit 40.0 - 53.0 % 33.9 (L)   Platelet Count 150 - 450 10e3/uL 380   (H): Data is abnormally high  (L): " Data is abnormally low      ASSESSMENT AND PLAN    Chemotherapy related side effects:     Started chemotherapy with FOLFOX on 7/24/2020.  Following that had nausea over the weekend.  He took both Zofran and Compazine.  Symptoms have improved today.  He is also having more watery stools in his ostomy and increased mucus production.  Loperamide prescribed to be taken when he notices the stool in his ostomy is watery.    2.  Cancer related pain:    Previously prescribed tramadol however this is not helping.  Will switch to oxycodone and Tylenol.    3.  Rectal cancer:    Initially presented with diarrhea in March 2024.  Sigmoidoscopy showed rectal mass infiltrative partially obstructing large mass 8 cm from the anal verge.  MRI pelvis showed carcinoma involving lower, middle and upper rectum extending over a length of roughly 12 cm.  Lesion involves the mesorectum but most significant invasion anterolaterally on the right at the level of the mid rectum.  Neoplasm is contiguous with the posterior margin of the right seminal vesicle but is not clearly invading it.  Extends roughly 1.5 cm beyond the rectal margin.  It also showed numerous enlarged mesorectal lymph nodes.  A inferior mesenteric lymph node was also noted.  There were also enlarged left iliac nodes and right iliac node.    He has already had a diverting ostomy by  on 6/6/2024.    Since his last visit his PET scan has now shown metastatic disease.  We discussed that at this point we would continue with chemotherapy.  We will restage him in 6 cycles.  I will also discuss his case with his surgeon given his new findings on PET scan.   PET scan showed 5 foci of abnormal avidity present within the liver, involvement of the common iliac lymph node, upper abdominal para-aortic lymph node, right common iliac node and left perirectal node.  A few inguinal nodes are also avid.    Will also send foundation 1 testing on his previous biopsy to evaluate for  mutational testing.    Discussed at this point given the extent of disease treatment would most likely consist of chemotherapy without radiation or surgery.    He is having some difficulty with his chemotherapy.  Did have increased nausea over the weekend and diarrhea.  Discussed antiemetic regimen and loperamide prescribed.  Will see him again before cycle 2 and if his symptoms do not improve or he struggles with more symptoms we may have to make dose reductions.    His ostomy also is protruding today.  He does says that his ostomy reduces on its own.  Discussed that if his ostomy does not reduce and he develops sudden nausea and vomiting he should contact his surgeon immediately and go to the emergency room.  For now we will continue to monitor.    Total time spent on the patient on day of encounter was 121 minutes doing chart review, review of outside records, review of test results, interpretation of results, patient visit and documentation.       Shawna Poon MD

## 2024-07-30 NOTE — PROGRESS NOTES
[facility]: Cancer Care                                                                                        Tc to infusion center regarding if they would be able to remove chemo pumps at their facility. It is something that they will be able to do. Patient updated and we will send orders next week for pump off to  [facility].      Signature:  Nano Verma RN

## 2024-07-30 NOTE — PROGRESS NOTES
Care Coordination Focused Note:    Met with pt and family friend, Sarina, in person. Together we filled out an application for pt to receive a disability/handicap placard for his vehicle. Pt will ask Dr. Poon to sign.     Pt is inquiring about how fast he can move out of his current apartment complex. There are issues with fellow tenants. Advised pt should be receiving mail from [facility] shortly to let him know the status of where he is at on the waiting list. Next visit will provide a list of landlords in the [place], [place] area.    Pt is inquiring if he can have his port changes (Aug. 8th & 22nd) done in [place] rather than [place]. Will consult w/pt's care team about this option.

## 2024-07-30 NOTE — NURSING NOTE
"Oncology Rooming Note    July 30, 2024 8:52 AM   Tal Mcbride is a 68 year old male who presents for:    Chief Complaint   Patient presents with    Oncology Clinic Visit     Follow up Rectal cancer     Initial Vitals: /60   Pulse 60   Temp 98.6  F (37  C) (Tympanic)   Resp 20   Ht 1.753 m (5' 9\")   Wt 70 kg (154 lb 5.2 oz)   SpO2 99%   BMI 22.79 kg/m   Estimated body mass index is 22.79 kg/m  as calculated from the following:    Height as of this encounter: 1.753 m (5' 9\").    Weight as of this encounter: 70 kg (154 lb 5.2 oz). Body surface area is 1.85 meters squared.  Extreme Pain (9) Comment: Data Unavailable   No LMP for male patient.  Allergies reviewed: Yes  Medications reviewed: Yes    Medications: Medication refills not needed today.  Pharmacy name entered into [employer]pi[employer]: [facility] [address] [place] [address]    Frailty Screening:   Is the patient here for a new oncology consult visit in cancer care? 2. No      Clinical concerns: Pain 9/10 rectal, vomiting yesterday times 2 and dry heaves, diarrhea in colostomy bag and mucous from rectum. [place] paper work.      Hermila Fletcher LPN             "

## 2024-07-31 ENCOUNTER — NURSE TRIAGE (OUTPATIENT)
Dept: NURSING | Facility: CLINIC | Age: 69
End: 2024-07-31

## 2024-08-01 NOTE — TELEPHONE ENCOUNTER
No ctc. She got him on the phone to give consent to communicate. He said it's okay to talk with her.  He has cancer and has a stoma. Hasn't had bowel movement for 24 hours. He was put on oxy on Tuesday. Stoma is doubled in size now. Very little stool yesterday. Does oxy make him constipated? I told her yes, it's constipating. She will get him dressed and bring him to the hospital ER now.     Promise Olvera RN  [facility]    Reason for Disposition   Patient sounds very sick or weak to the triager    Additional Information   Negative: Sounds like a life-threatening emergency to the triager   Negative: [1] Abdomen pain is main symptom AND [2] male   Negative: [1] Abdomen pain is main symptom AND [2] adult female   Negative: Rectal bleeding or blood in stool is main symptom   Negative: Rectal pain or itching is main symptom   Negative: [1] Neutropenia known or suspected (e.g., recent cancer chemotherapy) AND [2] fever > 100.4 F (38.0 C)   Negative: [1] Vomiting AND [2] contains bile (green color)    Protocols used: Cancer - Constipation-A-

## 2024-08-06 ENCOUNTER — ONCOLOGY VISIT (OUTPATIENT)
Dept: ONCOLOGY | Facility: OTHER | Age: 69
End: 2024-08-06
Attending: INTERNAL MEDICINE
Payer: COMMERCIAL

## 2024-08-06 ENCOUNTER — CARE COORDINATION (OUTPATIENT)
Dept: BEHAVIORAL HEALTH | Facility: OTHER | Age: 69
End: 2024-08-06

## 2024-08-06 ENCOUNTER — INFUSION THERAPY VISIT (OUTPATIENT)
Dept: INFUSION THERAPY | Facility: OTHER | Age: 69
End: 2024-08-06
Attending: INTERNAL MEDICINE
Payer: COMMERCIAL

## 2024-08-06 VITALS — DIASTOLIC BLOOD PRESSURE: 55 MMHG | SYSTOLIC BLOOD PRESSURE: 123 MMHG | HEART RATE: 61 BPM

## 2024-08-06 VITALS
SYSTOLIC BLOOD PRESSURE: 118 MMHG | DIASTOLIC BLOOD PRESSURE: 60 MMHG | TEMPERATURE: 97.9 F | HEIGHT: 69 IN | HEART RATE: 75 BPM | BODY MASS INDEX: 23.15 KG/M2 | OXYGEN SATURATION: 99 % | WEIGHT: 156.31 LBS

## 2024-08-06 DIAGNOSIS — C20 RECTAL CANCER (H): Primary | ICD-10-CM

## 2024-08-06 LAB
ALBUMIN SERPL BCG-MCNC: 4 G/DL (ref 3.5–5.2)
ALP SERPL-CCNC: 101 U/L (ref 40–150)
ALT SERPL W P-5'-P-CCNC: 13 U/L (ref 0–70)
ANION GAP SERPL CALCULATED.3IONS-SCNC: 12 MMOL/L (ref 7–15)
AST SERPL W P-5'-P-CCNC: 14 U/L (ref 0–45)
BASOPHILS # BLD AUTO: 0.1 10E3/UL (ref 0–0.2)
BASOPHILS NFR BLD AUTO: 1 %
BILIRUB SERPL-MCNC: 0.2 MG/DL
BUN SERPL-MCNC: 30.1 MG/DL (ref 8–23)
CALCIUM SERPL-MCNC: 9.3 MG/DL (ref 8.8–10.4)
CHLORIDE SERPL-SCNC: 98 MMOL/L (ref 98–107)
CREAT SERPL-MCNC: 0.93 MG/DL (ref 0.67–1.17)
EGFRCR SERPLBLD CKD-EPI 2021: 89 ML/MIN/1.73M2
EOSINOPHIL # BLD AUTO: 0.1 10E3/UL (ref 0–0.7)
EOSINOPHIL NFR BLD AUTO: 2 %
ERYTHROCYTE [DISTWIDTH] IN BLOOD BY AUTOMATED COUNT: 12.8 % (ref 10–15)
GLUCOSE SERPL-MCNC: 235 MG/DL (ref 70–99)
HCO3 SERPL-SCNC: 24 MMOL/L (ref 22–29)
HCT VFR BLD AUTO: 33.6 % (ref 40–53)
HGB BLD-MCNC: 11.6 G/DL (ref 13.3–17.7)
IMM GRANULOCYTES # BLD: 0 10E3/UL
IMM GRANULOCYTES NFR BLD: 0 %
LYMPHOCYTES # BLD AUTO: 1.7 10E3/UL (ref 0.8–5.3)
LYMPHOCYTES NFR BLD AUTO: 19 %
MCH RBC QN AUTO: 30.4 PG (ref 26.5–33)
MCHC RBC AUTO-ENTMCNC: 34.5 G/DL (ref 31.5–36.5)
MCV RBC AUTO: 88 FL (ref 78–100)
MONOCYTES # BLD AUTO: 0.7 10E3/UL (ref 0–1.3)
MONOCYTES NFR BLD AUTO: 7 %
NEUTROPHILS # BLD AUTO: 6.3 10E3/UL (ref 1.6–8.3)
NEUTROPHILS NFR BLD AUTO: 71 %
NRBC # BLD AUTO: 0 10E3/UL
NRBC BLD AUTO-RTO: 0 /100
PLATELET # BLD AUTO: 318 10E3/UL (ref 150–450)
POTASSIUM SERPL-SCNC: 4.5 MMOL/L (ref 3.4–5.3)
PROT SERPL-MCNC: 6.6 G/DL (ref 6.4–8.3)
RBC # BLD AUTO: 3.81 10E6/UL (ref 4.4–5.9)
SODIUM SERPL-SCNC: 134 MMOL/L (ref 135–145)
WBC # BLD AUTO: 8.9 10E3/UL (ref 4–11)

## 2024-08-06 PROCEDURE — 99215 OFFICE O/P EST HI 40 MIN: CPT | Performed by: INTERNAL MEDICINE

## 2024-08-06 PROCEDURE — 36591 DRAW BLOOD OFF VENOUS DEVICE: CPT | Performed by: INTERNAL MEDICINE

## 2024-08-06 PROCEDURE — 85025 COMPLETE CBC W/AUTO DIFF WBC: CPT | Mod: ZL | Performed by: INTERNAL MEDICINE

## 2024-08-06 PROCEDURE — 96375 TX/PRO/DX INJ NEW DRUG ADDON: CPT

## 2024-08-06 PROCEDURE — 258N000003 HC RX IP 258 OP 636: Performed by: INTERNAL MEDICINE

## 2024-08-06 PROCEDURE — 96413 CHEMO IV INFUSION 1 HR: CPT

## 2024-08-06 PROCEDURE — G0463 HOSPITAL OUTPT CLINIC VISIT: HCPCS

## 2024-08-06 PROCEDURE — 96521 REFILL/MAINT PORTABLE PUMP: CPT | Mod: XU

## 2024-08-06 PROCEDURE — 82040 ASSAY OF SERUM ALBUMIN: CPT | Mod: ZL | Performed by: INTERNAL MEDICINE

## 2024-08-06 PROCEDURE — 96411 CHEMO IV PUSH ADDL DRUG: CPT

## 2024-08-06 PROCEDURE — 250N000011 HC RX IP 250 OP 636: Performed by: INTERNAL MEDICINE

## 2024-08-06 PROCEDURE — 96415 CHEMO IV INFUSION ADDL HR: CPT

## 2024-08-06 PROCEDURE — 96367 TX/PROPH/DG ADDL SEQ IV INF: CPT

## 2024-08-06 PROCEDURE — 36415 COLL VENOUS BLD VENIPUNCTURE: CPT | Performed by: INTERNAL MEDICINE

## 2024-08-06 PROCEDURE — G0463 HOSPITAL OUTPT CLINIC VISIT: HCPCS | Mod: 25

## 2024-08-06 RX ORDER — EPINEPHRINE 1 MG/ML
0.3 INJECTION, SOLUTION, CONCENTRATE INTRAVENOUS EVERY 5 MIN PRN
Status: CANCELLED | OUTPATIENT
Start: 2024-08-06

## 2024-08-06 RX ORDER — ONDANSETRON 2 MG/ML
8 INJECTION INTRAMUSCULAR; INTRAVENOUS ONCE
Status: COMPLETED | OUTPATIENT
Start: 2024-08-06 | End: 2024-08-06

## 2024-08-06 RX ORDER — LORAZEPAM 2 MG/ML
0.5 INJECTION INTRAMUSCULAR EVERY 4 HOURS PRN
Status: CANCELLED | OUTPATIENT
Start: 2024-08-06

## 2024-08-06 RX ORDER — ALBUTEROL SULFATE 90 UG/1
1-2 AEROSOL, METERED RESPIRATORY (INHALATION)
Status: CANCELLED
Start: 2024-08-06

## 2024-08-06 RX ORDER — DEXAMETHASONE 4 MG/1
8 TABLET ORAL DAILY
Qty: 4 TABLET | Refills: 4 | Status: SHIPPED | OUTPATIENT
Start: 2024-08-06 | End: 2024-08-20

## 2024-08-06 RX ORDER — DIPHENHYDRAMINE HYDROCHLORIDE 50 MG/ML
50 INJECTION INTRAMUSCULAR; INTRAVENOUS
Status: CANCELLED
Start: 2024-08-06

## 2024-08-06 RX ORDER — HEPARIN SODIUM,PORCINE 10 UNIT/ML
5-20 VIAL (ML) INTRAVENOUS DAILY PRN
Status: CANCELLED | OUTPATIENT
Start: 2024-08-06

## 2024-08-06 RX ORDER — MEPERIDINE HYDROCHLORIDE 25 MG/ML
25 INJECTION INTRAMUSCULAR; INTRAVENOUS; SUBCUTANEOUS EVERY 30 MIN PRN
Status: CANCELLED | OUTPATIENT
Start: 2024-08-06

## 2024-08-06 RX ORDER — HEPARIN SODIUM (PORCINE) LOCK FLUSH IV SOLN 100 UNIT/ML 100 UNIT/ML
5 SOLUTION INTRAVENOUS
Status: CANCELLED | OUTPATIENT
Start: 2024-08-08

## 2024-08-06 RX ORDER — HEPARIN SODIUM,PORCINE 10 UNIT/ML
5-20 VIAL (ML) INTRAVENOUS DAILY PRN
Status: CANCELLED | OUTPATIENT
Start: 2024-08-08

## 2024-08-06 RX ORDER — HEPARIN SODIUM (PORCINE) LOCK FLUSH IV SOLN 100 UNIT/ML 100 UNIT/ML
5 SOLUTION INTRAVENOUS
Status: CANCELLED | OUTPATIENT
Start: 2024-08-06

## 2024-08-06 RX ORDER — FLUOROURACIL 50 MG/ML
400 INJECTION, SOLUTION INTRAVENOUS ONCE
Status: CANCELLED | OUTPATIENT
Start: 2024-08-06

## 2024-08-06 RX ORDER — FLUOROURACIL 50 MG/ML
400 INJECTION, SOLUTION INTRAVENOUS ONCE
Status: COMPLETED | OUTPATIENT
Start: 2024-08-06 | End: 2024-08-06

## 2024-08-06 RX ORDER — METHYLPREDNISOLONE SODIUM SUCCINATE 125 MG/2ML
125 INJECTION, POWDER, LYOPHILIZED, FOR SOLUTION INTRAMUSCULAR; INTRAVENOUS
Status: CANCELLED
Start: 2024-08-06

## 2024-08-06 RX ORDER — ALBUTEROL SULFATE 0.83 MG/ML
2.5 SOLUTION RESPIRATORY (INHALATION)
Status: CANCELLED | OUTPATIENT
Start: 2024-08-06

## 2024-08-06 RX ADMIN — ONDANSETRON 8 MG: 2 INJECTION INTRAMUSCULAR; INTRAVENOUS at 10:20

## 2024-08-06 RX ADMIN — DEXTROSE MONOHYDRATE 250 ML: 50 INJECTION, SOLUTION INTRAVENOUS at 10:19

## 2024-08-06 RX ADMIN — FLUOROURACIL 745 MG: 50 INJECTION, SOLUTION INTRAVENOUS at 14:13

## 2024-08-06 RX ADMIN — LEUCOVORIN CALCIUM 750 MG: 350 INJECTION, POWDER, LYOPHILIZED, FOR SUSPENSION INTRAMUSCULAR; INTRAVENOUS at 11:20

## 2024-08-06 RX ADMIN — OXALIPLATIN 150 MG: 5 INJECTION, SOLUTION INTRAVENOUS at 11:24

## 2024-08-06 RX ADMIN — FOSAPREPITANT: 150 INJECTION, POWDER, LYOPHILIZED, FOR SOLUTION INTRAVENOUS at 10:21

## 2024-08-06 ASSESSMENT — PAIN SCALES - GENERAL: PAINLEVEL: NO PAIN (0)

## 2024-08-06 NOTE — PROGRESS NOTES
MEDICAL ONCOLOGY FOLLOW-UP NOTE  Aug 6, 2024    Reason for follow-up: Rectal cancer    HISTORY OF PRESENT ILLNESS  Tal Mcbride is a 68 year old male with PMH as stated below who is seen in the oncology clinic for rectal cancer.    His history in short is as follows:    Mr. Mcbride started having diarrhea in the end of March 2024.  He was evaluated by his primary care and recommended to undergo a colonoscopy and upper endoscopy.    4/29/2024: EGD: 3 cm hiatal hernia.  Normal stomach.  Normal third portion of the duodenum.  Nonobstructing Schatzki's ring.    4/29/2024: Sigmoidoscopy: The digital rectal exam revealed a soft tissue mass palpated 8 cm from the anal verge.  The mass was noncircumferential and located predominantly at the posterior bowel wall.  An infiltrative partially obstructing large mass was found in the rectum.  The mass was circumferential.  The mass measured 4 cm in length.  No bleeding was present.  This was biopsied.  A pediatric colonoscope was passed past the area but patient had a large quantity of stool present just above the stricture so colonoscopy was not able to be completed.    Biopsy of the rectal mass showed moderately to poorly differentiated adenocarcinoma.    5/2/2024 CT chest with contrast: No definite findings for metastatic disease in the chest. 2 small pulmonary nodules measuring up to 4 mm unchanged from prior study     5/2/2024: MRI pelvis with and without contrast: A carcinoma involving the lower, middle and upper rectum extending over a length of roughly 12 cm demonstrated.  This lesion involves the mesorectum with no significant invasion anterolaterally on the right at the level of the mid rectum.  At this site infiltrative neoplasm is contactless with the posterior margin of the right seminal vesicle but it is not clearly invaded.  Neoplasm extends roughly 1.5 cm beyond the rectal margin.  Numerous enlarged mesorectal lymph nodes are demonstrated.  The larger mesorectal  lymph node is demonstrated on the right measuring 1.8X 1.5 cm.  Additional mesorectal lymph nodes with short axis measurements at least 1 cm demonstrated.  Inferior mesenteric lymph nodes with short axis measurement of 1.1 cm X 1.0 cm are noted.  A left internal iliac node measuring 1.2X 0.8 cm is demonstrated.  A 2.7 X1.8 centimeter right iliac node is demonstrated.  The other visualized bowel is unremarkable.  The prostate is mildly enlarged.  No free fluid is demonstrated.    5/13/2024: CT abdomen and pelvis without contrast: Large infiltrative rectal mass involving the upper middle and lower rectum.  There is surrounding inflammatory change and pelvic free fluid.  There is adjacent mesorectal adenopathy, a left internal iliac node as well.  Large amount of stool throughout the colon.  Enlarged prostate gland.  Left probable UPJ obstruction.    6/6/2024: Diverting colostomy    7/24/2024: Cycle 1 of FOLFOX    7/25/2024: PET scan: Masslike thickening with associated FDG avidity throughout the rectum is consistent with given history.  Metastatic disease within the liver, retroperitoneal nodes, perirectal nodes and inguinal nodes.  Findings are concerning for a UPJ obstruction.  Nuclear medicine renal Lasix scan for further characterize.    Interim history:    Mr. Mcbride is doing well today.  He did well over the rest of the week after his treatment.  He denies any nausea or vomiting.  Denies any worsening fatigue.  Denies any fever or chills.  He was seen in the emergency room in [place] on 7/31/2024 for constipation.  Received Senokot and MiraLAX which helped.    REVIEW OF SYSTEMS  A 12-point ROS negative except as in HPI      Current Outpatient Medications   Medication Sig Dispense Refill    acetaminophen (TYLENOL) 500 MG tablet Take 650 mg by mouth every 6 hours as needed for mild pain      aspirin 81 MG EC tablet Take 81 mg by mouth daily      atorvastatin (LIPITOR) 20 MG tablet Take 20 mg by mouth daily       dexAMETHasone (DECADRON) 4 MG tablet Take 2 tablets (8 mg) by mouth daily Take for 2 days, starting the day after chemo. Take with food. 4 tablet 2    hydrochlorothiazide (HYDRODIURIL) 25 MG tablet Take 25 mg by mouth daily      lidocaine-prilocaine (EMLA) 2.5-2.5 % external cream Apply topically daily as needed for moderate pain 30 g 1    lisinopril (ZESTRIL) 40 MG tablet Take 40 mg by mouth daily      loperamide (IMODIUM A-D) 2 MG tablet Take 1 tablet (2 mg) by mouth 4 times daily as needed for diarrhea 90 tablet 0    metFORMIN (GLUCOPHAGE) 1000 MG tablet Take 750 mg by mouth 2 times daily (with meals) (Patient not taking: Reported on 7/23/2024)      metFORMIN (GLUCOPHAGE-XR) 750 MG 24 hr tablet Take 750 mg by mouth 2 times daily (with meals)      multivitamin w/minerals (MULTI-VITAMIN) tablet Take 1 tablet by mouth daily      ondansetron (ZOFRAN) 8 MG tablet Take 1 tablet (8 mg) by mouth every 8 hours as needed for nausea (vomiting) 30 tablet 2    oxyCODONE-acetaminophen (PERCOCET) 5-325 MG tablet Take 1 tablet by mouth every 6 hours as needed for pain 120 tablet 0    Oxymetazoline HCl (NASAL DECONGESTANT SPRAY NA) Spray in nostril as needed (Patient not taking: Reported on 7/30/2024)      Polyethylene Glycol 3350 (MIRALAX PO) Take 1 Capful by mouth daily      prochlorperazine (COMPAZINE) 5 MG tablet Take 1 tablet (5 mg) by mouth every 6 hours as needed for nausea or vomiting 90 tablet 0    traMADol (ULTRAM) 50 MG tablet Take 1 tablet (50 mg) by mouth every 6 hours as needed for severe pain 40 tablet 0    traZODone (DESYREL) 100 MG tablet Take 100 mg by mouth at bedtime         No Known Allergies  Immunization History   Administered Date(s) Administered    COVID-19 Bivalent 12+ (Pfizer) 10/27/2022    COVID-19 Monovalent 18+ (Moderna) 01/16/2021, 02/13/2021, 11/29/2021    Hepatitis B, Adult 10/03/2013, 03/28/2014, 12/30/2014    Influenza Vaccine (Flucelvax Quadrivalent) 10/07/2022    Pneumococcal 23 valent  "10/03/2013    TDAP (Adacel,Boostrix) 10/03/2012       Past Medical History:   Diagnosis Date    Adolescent idiopathic scoliosis of thoracolumbar region 11/16/2017    Bunion 03/28/2013    Essential hypertension 03/05/2012    Formatting of this note might be different from the original.   Epic      Pure hypercholesterolemia 10/04/2012    Rectal cancer (H) 05/29/2024    Severe protein-calorie malnutrition (H24) 06/07/2024    Type 2 diabetes mellitus without complication (H) 06/21/2012       Past Surgical History:   Procedure Laterality Date    CATARACT EXTRACTION W/ INTRAOCULAR LENS IMPLANT Left 2021    ENDOSCOPY N/A     upper and lower    GI SURGERY      PHACOEMULSIFICATION WITH STANDARD INTRAOCULAR LENS IMPLANT Right 09/29/2021    Procedure: right Cataract Extraction with Implant;  Surgeon: Juan Prado MD;  Location: [facility] OR    robotic colostomy N/A 06/06/2024       SOCIAL HISTORY  History   Smoking Status    Former    Types: Cigarettes   Smokeless Tobacco    Former    Social History    Substance and Sexual Activity      Alcohol use: Not Currently     History   Drug Use Unknown       FAMILY HISTORY  Family History   Problem Relation Age of Onset    Pancreatic Cancer Father 55       PHYSICAL EXAMINATION  /60 (BP Location: Right arm, Patient Position: Sitting, Cuff Size: Adult Regular)   Pulse 75   Temp 97.9  F (36.6  C) (Tympanic)   Ht 1.753 m (5' 9\")   Wt 70.9 kg (156 lb 4.9 oz)   SpO2 99%   BMI 23.08 kg/m    Wt Readings from Last 2 Encounters:   08/06/24 70.9 kg (156 lb 4.9 oz)   07/30/24 70 kg (154 lb 5.2 oz)     Physical Exam  Constitutional:       Appearance: Normal appearance.   Abdominal:      General: Abdomen is flat.      Comments: Seen protruding from ostomy.  Red in color.    Neurological:      General: No focal deficit present.      Mental Status: He is alert and oriented to person, place, and time.   Psychiatric:         Mood and Affect: Mood normal.     Laboratory and imaging:     " Latest Reference Range & Units 07/24/24 09:02   WBC 4.0 - 11.0 10e3/uL 14.1 (H)   Hemoglobin 13.3 - 17.7 g/dL 11.9 (L)   Hematocrit 40.0 - 53.0 % 33.9 (L)   Platelet Count 150 - 450 10e3/uL 380   (H): Data is abnormally high  (L): Data is abnormally low      ASSESSMENT AND PLAN    1.  Rectal cancer:    Initially presented with diarrhea in March 2024.  Sigmoidoscopy showed rectal mass infiltrative partially obstructing large mass 8 cm from the anal verge.  MRI pelvis showed carcinoma involving lower, middle and upper rectum extending over a length of roughly 12 cm.  Lesion involves the mesorectum but most significant invasion anterolaterally on the right at the level of the mid rectum.  Neoplasm is contiguous with the posterior margin of the right seminal vesicle but is not clearly invading it.  Extends roughly 1.5 cm beyond the rectal margin.  It also showed numerous enlarged mesorectal lymph nodes.  A inferior mesenteric lymph node was also noted.  There were also enlarged left iliac nodes and right iliac node.    He has already had a diverting ostomy by  on 6/6/2024.    Since his last visit his PET scan has now shown metastatic disease.  We discussed that at this point we would continue with chemotherapy.  We will restage him in 6 cycles.  I will also discuss his case with his surgeon given his new findings on PET scan.   PET scan showed 5 foci of abnormal avidity present within the liver, involvement of the common iliac lymph node, upper abdominal para-aortic lymph node, right common iliac node and left perirectal node.  A few inguinal nodes are also avid.    Will also send foundation 1 testing on his previous biopsy to evaluate for mutational testing.    Discussed at this point given the extent of disease treatment would most likely consist of chemotherapy without radiation or surgery.    He did well for the rest of the week.  We discussed again regarding dose reduction for now we will continue with  current dose.  No further nausea or diarrhea.  He was actually seen for constipation at the emergency room in [place].  He underwent a CT abdomen which did not show any obstruction.  He is now having stool in the ostomy after his bowel regimen was adjusted.  He denies any abdominal pain.  Still having protrusion from the ostomy.  Recommend he reach out to his surgeon.  His rectal pain has also improved.    He will continue to cycle 2 today    Follow-up in clinic prior to cycle 3.      Shawna Poon MD

## 2024-08-06 NOTE — PROGRESS NOTES
Care Coordination Focused Note:    Met w/pt and wife in Infusion. Filled out applications for [employer], [facility], [facility]'s [facility]a [facility], and applied for SNAP and General Assistance through [facility].  Waiting for pt's bank statements (will be faxed to writer) to add to the applications.  Already have copy of spouse's bank statements.    Was advised pt has heard back from [employer] and will be receiving monetary gifts from them.    Will check in with pt and spouse at a future visit to get status updates about applications.  Will also apply for medical assistance.

## 2024-08-06 NOTE — NURSING NOTE
"Oncology Rooming Note    August 6, 2024 9:26 AM   Tal Mcbride is a 68 year old male who presents for:    Chief Complaint   Patient presents with    Oncology Clinic Visit     Follow up. Rectal cancer      Initial Vitals: /60 (BP Location: Right arm, Patient Position: Sitting, Cuff Size: Adult Regular)   Pulse 75   Temp 97.9  F (36.6  C) (Tympanic)   Ht 1.753 m (5' 9\")   Wt 70.9 kg (156 lb 4.9 oz)   SpO2 99%   BMI 23.08 kg/m   Estimated body mass index is 23.08 kg/m  as calculated from the following:    Height as of this encounter: 1.753 m (5' 9\").    Weight as of this encounter: 70.9 kg (156 lb 4.9 oz). Body surface area is 1.86 meters squared.  No Pain (0) Comment: Data Unavailable   No LMP for male patient.  Allergies reviewed: Yes  Medications reviewed: Yes    Medications: Medication refills not needed today.  Pharmacy name entered into [employer] - [place]: [facility] [id number] [facility]    Frailty Screening:   Is the patient here for a new oncology consult visit in cancer care? 2. No      Clinical concerns: none       Larissa Petty LPN              " Medical Assistant/Nurse notes reviewed and accepted.  Problem List Reviewed.  Skin system in problem list updated.    Digital photo(s) obtained with patient consent.       MOHS MICROGRAPHIC SURGERY    PROCEDURE: 1 of 2    Surgeon: Merritt Schumacher MD  Scrub nurse: Betty Mac MA and Devaughn Chew MA  Lesion: 1.2 cm.  basal cell carcinoma from left upper  forehead.  Final defect: 1.7 cm. by 1.5 cm.  Postop Diagnosis: Same  Preparation of skin: Betadine scrub and Sterile Water  Preparatory medications: none    Anesthesia: 9 cc of 1% Lidocaine with 1:100,000 epinephrine    Patient feels well today and wishes to proceed with surgery.    STAGE I, LAYER I:  The nature and purpose of the procedure, associated risks, possible consequences, complications, and alternative methods of treatment were explained to the patient in detail by Dr. Schumacher. An informed operative consent was obtained. The patient was positioned, prepped, and draped in the usual sterile manner. The clinically apparent tumor was marked with Gentian violet along its outer border.  Local anesthesia was then obtained by infiltrating with the agents mentioned above.  The center of the clinically apparent tumor was lightly debulked with a curette.    STAGE I, LAYER 2:   An additional 2-3 mm rim of normal appearing tissue was marked circumferentially around the residual area(s) of skin cancer.   A 15c scalpel blade is used to make a small nick at the twelve o'clock position, perpendicular to and crossing the circumferential marking around the skin cancer. The area(s) thus outlined was/were excised deep to the muscularis. Hemostasis was obtained with Surgi-stat  electrocoagulation. The specimen was oriented, subdivided into 1 section(s), chromacoded, and submitted for horizontal frozen sections. The patient tolerated the procedure well and there were no complications noted. On review of the horizontal frozen sections of Stage 1, Layer 2, residual tumor  identified at peripheral sites.       STAGE II:  An additional 2-3 mm rim of normal appearing tissue was marked  around the residual area(s) of skin cancer. If the twelve o'clock position is involved, the skin nick is lengthened to extend outside the Gentian violet markings. The area(s) thus outlined was/were excised deep to the muscularis. Hemostasis was obtained with Surgi-stat  electrocoagulation. The specimen was oriented, subdivided into 1 section(s), and submitted for horizontal frozen sections. The patient tolerated the procedure well and there were no complications noted. On review of the horizontal frozen sections of Stage 2, no residual tumor is identified.    The total number of microscopic sections was 2    CLOSURE OF DEFECT:  Closure options were discussed with the patient.  Type: Complex linear closure    DRESSING: The wound was dressed with a Polysporin, Adaptic, gauze, Hypafix pressure type bandage. Wound care instructions given and follow-up arranged.    DIAGNOSIS: basal cell carcinoma status post Mohs' surgery.      The patient tolerated the procedures well and left the dermatology clinic in good condition.  Home care instructions given and reviewed with patient.    Clinical photographs obtained with patient's consent.          MOHS MICROGRAPHIC SURGERY REPAIR  COMPLEX LINEAR CLOSURE    PROCEDURE: 2 of 2    Juan Eastman  3/4/2019    Surgeon: Merritt Schumacher MD  Scrub nurse: Devaughn Chew MA  Anesthesia: 6 cc of 1% Lidocaine with 1:100,000 epinephrine  Clinical Diagnosis: 1.7 cm. by 1.5 cm. surgical defect secondary to Mohs micrographically controlled excision of basal cell carcinoma from left upper forehead.  Operation: Complex linear closure of Mohs defect on forehead.  Final defect: NA  Postop Diagnosis: Same  Preparation: Betadine scrub and Sterile Water    The nature and purpose of the procedure, associated risks, possible consequences, complications, and alternative methods of treatment  were explained to the patient in detail by Dr. Schumacher. An informed operative consent was obtained. The patient was positioned, prepped, and draped in the usual sterile manner. Adequate anesthesia was then obtained by infiltrating the above anesthetics along the edges and the base of the defect. The defect size prior to closure was 1.7 cm. The edges of the defect were undermined at the muscularis subcutaneous layer approximately 2 and 4 cm. in all directions. The edges could then be apposed without significant tension. Adequate hemostasis was obtained with electrocoagulation. The deep wound edges were apposed and sutured with multiple 4-0 Polysorb buried sutures. The epidermal edges were then approximated with 10 5-0 Prolene sutures. Redundant tissue repair was performed and subsequently closed in a similar fashion. The resulting closed linear wound measured approximately 4 cm. and ran horizontally, tangentially and along relaxed skin tension lines. The surgical site was lightly scrubbed with sterile saline. Polysporin ointment, adaptic, and gauze pressure dressing were applied to the closed wound. The patient tolerated the procedure well without complications and was given both verbal and written explicit instructions on postoperative wound care. Follow-up for suture removal in 7 days.    Patient was prescribed: Plain Tylenol prn    The patient was discharged from the Dermatology Clinic in good condition.

## 2024-08-06 NOTE — PROGRESS NOTES
Patients power port accessed using non-coring, 19 gauge, 3/4 inch needle.    Mask donned by caregiver: yes Site prepped with CHG: yes Labs drawn: yes Dressing applied using aseptic technique: yes.     Port accessed per facility protocol. Port flushed easily, blood return noted.  No signs and symptoms of infection or infiltration.  Port flushed with 10mL normal saline, blood return noted, 10 mLs blood discarded. Blood taken for ordered labs, port flushed with 20mL normal saline.  Port left accessed as patient has appointment with Dr. Poon, and is then due for chemo if parameters are met.  Patient discharged with no complaints.

## 2024-08-06 NOTE — PROGRESS NOTES
Infusion Nursing Note:  Tal Mcbride presents today for Folfox.    Patient seen by provider today: Yes: Dr. Poon.   present during visit today: Not Applicable.    Note:   Component      Latest Ref Rng 8/6/2024  8:58 AM   WBC      4.0 - 11.0 10e3/uL 8.9    RBC Count      4.40 - 5.90 10e6/uL 3.81 (L)    Hemoglobin      13.3 - 17.7 g/dL 11.6 (L)    Hematocrit      40.0 - 53.0 % 33.6 (L)    MCV      78 - 100 fL 88    MCH      26.5 - 33.0 pg 30.4    MCHC      31.5 - 36.5 g/dL 34.5    RDW      10.0 - 15.0 % 12.8    Platelet Count      150 - 450 10e3/uL 318    % Neutrophils      % 71    % Lymphocytes      % 19    % Monocytes      % 7    % Eosinophils      % 2    % Basophils      % 1    % Immature Granulocytes      % 0    NRBCs per 100 WBC      <1 /100 0    Absolute Neutrophils      1.6 - 8.3 10e3/uL 6.3    Absolute Lymphocytes      0.8 - 5.3 10e3/uL 1.7    Absolute Monocytes      0.0 - 1.3 10e3/uL 0.7    Absolute Eosinophils      0.0 - 0.7 10e3/uL 0.1    Absolute Basophils      0.0 - 0.2 10e3/uL 0.1    Absolute Immature Granulocytes      <=0.4 10e3/uL 0.0    Absolute NRBCs      10e3/uL 0.0    Sodium      135 - 145 mmol/L 134 (L)    Potassium      3.4 - 5.3 mmol/L 4.5    Carbon Dioxide (CO2)      22 - 29 mmol/L 24    Anion Gap      7 - 15 mmol/L 12    Urea Nitrogen      8.0 - 23.0 mg/dL 30.1 (H)    Creatinine      0.67 - 1.17 mg/dL 0.93    GFR Estimate      >60 mL/min/1.73m2 89    Calcium      8.8 - 10.4 mg/dL 9.3    Chloride      98 - 107 mmol/L 98    Glucose      70 - 99 mg/dL 235 (H)    Alkaline Phosphatase      40 - 150 U/L 101    AST      0 - 45 U/L 14    ALT      0 - 70 U/L 13    Protein Total      6.4 - 8.3 g/dL 6.6    Albumin      3.5 - 5.2 g/dL 4.0    Bilirubin Total      <=1.2 mg/dL 0.2       Legend:  (L) Low  (H) High.      Intravenous Access:  Implanted Port.    Treatment Conditions:  Results reviewed, labs MET treatment parameters, ok to proceed with treatment.      Post Infusion  Assessment:  Patient tolerated infusion without incident.  Blood return noted pre and post infusion.  Blood return noted during administration every 2 ml during Fluorocil push.  Site patent and intact, free from redness, edema or discomfort.   Home infuser attached to patient, clamps taped open. Pt plans to have infuser removed at [place].       Discharge Plan:   Discharge instructions reviewed with: Patient.

## 2024-08-07 ENCOUNTER — PATIENT OUTREACH (OUTPATIENT)
Dept: ONCOLOGY | Facility: OTHER | Age: 69
End: 2024-08-07

## 2024-08-07 NOTE — PROGRESS NOTES
[facility]: Cancer Care                                                                                        Received notification that there is not enough specimen for IHC testing. VORB From Dr. Poon to cancel the test. TC to [facility] and cancelled IHC testing.       Signature:  Nano Verma RN

## 2024-08-08 ENCOUNTER — TELEPHONE (OUTPATIENT)
Dept: CARE COORDINATION | Facility: OTHER | Age: 69
End: 2024-08-08

## 2024-08-08 NOTE — CONFIDENTIAL NOTE
Care Coordination Focused Note:    Called and spoke with pt. Advised writer did not receive his bank statements via fax (requested on 8/6). Pt notes he is supposed to be receiving them in the mail. He will either bring them with him to his next appt or will call and ask that they be re-faxed to writer.

## 2024-08-20 ENCOUNTER — INFUSION THERAPY VISIT (OUTPATIENT)
Dept: INFUSION THERAPY | Facility: OTHER | Age: 69
End: 2024-08-20
Attending: INTERNAL MEDICINE
Payer: COMMERCIAL

## 2024-08-20 ENCOUNTER — CARE COORDINATION (OUTPATIENT)
Dept: CARE COORDINATION | Facility: OTHER | Age: 69
End: 2024-08-20

## 2024-08-20 ENCOUNTER — PATIENT OUTREACH (OUTPATIENT)
Dept: ONCOLOGY | Facility: OTHER | Age: 69
End: 2024-08-20

## 2024-08-20 ENCOUNTER — ONCOLOGY VISIT (OUTPATIENT)
Dept: ONCOLOGY | Facility: OTHER | Age: 69
End: 2024-08-20
Attending: INTERNAL MEDICINE
Payer: COMMERCIAL

## 2024-08-20 VITALS — SYSTOLIC BLOOD PRESSURE: 121 MMHG | DIASTOLIC BLOOD PRESSURE: 58 MMHG

## 2024-08-20 VITALS
SYSTOLIC BLOOD PRESSURE: 112 MMHG | OXYGEN SATURATION: 99 % | HEART RATE: 62 BPM | BODY MASS INDEX: 23.57 KG/M2 | TEMPERATURE: 96.8 F | WEIGHT: 159.61 LBS | DIASTOLIC BLOOD PRESSURE: 58 MMHG

## 2024-08-20 DIAGNOSIS — C20 RECTAL CANCER (H): Primary | ICD-10-CM

## 2024-08-20 LAB
ALBUMIN SERPL BCG-MCNC: 4.3 G/DL (ref 3.5–5.2)
ALP SERPL-CCNC: 89 U/L (ref 40–150)
ALT SERPL W P-5'-P-CCNC: 17 U/L (ref 0–70)
ANION GAP SERPL CALCULATED.3IONS-SCNC: 12 MMOL/L (ref 7–15)
AST SERPL W P-5'-P-CCNC: 15 U/L (ref 0–45)
BASOPHILS # BLD AUTO: 0.1 10E3/UL (ref 0–0.2)
BASOPHILS NFR BLD AUTO: 1 %
BILIRUB SERPL-MCNC: 0.5 MG/DL
BUN SERPL-MCNC: 25.8 MG/DL (ref 8–23)
CALCIUM SERPL-MCNC: 9.5 MG/DL (ref 8.8–10.4)
CEA SERPL-MCNC: 26.4 NG/ML
CHLORIDE SERPL-SCNC: 97 MMOL/L (ref 98–107)
CREAT SERPL-MCNC: 0.93 MG/DL (ref 0.67–1.17)
EGFRCR SERPLBLD CKD-EPI 2021: 89 ML/MIN/1.73M2
EOSINOPHIL # BLD AUTO: 0.1 10E3/UL (ref 0–0.7)
EOSINOPHIL NFR BLD AUTO: 1 %
ERYTHROCYTE [DISTWIDTH] IN BLOOD BY AUTOMATED COUNT: 13.5 % (ref 10–15)
GLUCOSE SERPL-MCNC: 202 MG/DL (ref 70–99)
HCO3 SERPL-SCNC: 27 MMOL/L (ref 22–29)
HCT VFR BLD AUTO: 33.6 % (ref 40–53)
HGB BLD-MCNC: 11.7 G/DL (ref 13.3–17.7)
IMM GRANULOCYTES # BLD: 0.1 10E3/UL
IMM GRANULOCYTES NFR BLD: 1 %
LYMPHOCYTES # BLD AUTO: 2.1 10E3/UL (ref 0.8–5.3)
LYMPHOCYTES NFR BLD AUTO: 25 %
MCH RBC QN AUTO: 31 PG (ref 26.5–33)
MCHC RBC AUTO-ENTMCNC: 34.8 G/DL (ref 31.5–36.5)
MCV RBC AUTO: 89 FL (ref 78–100)
MONOCYTES # BLD AUTO: 0.8 10E3/UL (ref 0–1.3)
MONOCYTES NFR BLD AUTO: 9 %
NEUTROPHILS # BLD AUTO: 5.2 10E3/UL (ref 1.6–8.3)
NEUTROPHILS NFR BLD AUTO: 63 %
NRBC # BLD AUTO: 0 10E3/UL
NRBC BLD AUTO-RTO: 0 /100
PLATELET # BLD AUTO: 251 10E3/UL (ref 150–450)
POTASSIUM SERPL-SCNC: 4.3 MMOL/L (ref 3.4–5.3)
PROT SERPL-MCNC: 6.5 G/DL (ref 6.4–8.3)
RBC # BLD AUTO: 3.78 10E6/UL (ref 4.4–5.9)
SODIUM SERPL-SCNC: 136 MMOL/L (ref 135–145)
WBC # BLD AUTO: 8.4 10E3/UL (ref 4–11)

## 2024-08-20 PROCEDURE — 258N000003 HC RX IP 258 OP 636: Performed by: INTERNAL MEDICINE

## 2024-08-20 PROCEDURE — 85025 COMPLETE CBC W/AUTO DIFF WBC: CPT | Mod: ZL | Performed by: INTERNAL MEDICINE

## 2024-08-20 PROCEDURE — 99215 OFFICE O/P EST HI 40 MIN: CPT | Performed by: INTERNAL MEDICINE

## 2024-08-20 PROCEDURE — 96368 THER/DIAG CONCURRENT INF: CPT

## 2024-08-20 PROCEDURE — 96413 CHEMO IV INFUSION 1 HR: CPT

## 2024-08-20 PROCEDURE — 36591 DRAW BLOOD OFF VENOUS DEVICE: CPT | Performed by: INTERNAL MEDICINE

## 2024-08-20 PROCEDURE — 96521 REFILL/MAINT PORTABLE PUMP: CPT | Mod: XU

## 2024-08-20 PROCEDURE — G0463 HOSPITAL OUTPT CLINIC VISIT: HCPCS | Mod: 25

## 2024-08-20 PROCEDURE — 250N000011 HC RX IP 250 OP 636: Mod: JZ | Performed by: INTERNAL MEDICINE

## 2024-08-20 PROCEDURE — 96415 CHEMO IV INFUSION ADDL HR: CPT

## 2024-08-20 PROCEDURE — 82378 CARCINOEMBRYONIC ANTIGEN: CPT | Mod: ZL | Performed by: INTERNAL MEDICINE

## 2024-08-20 PROCEDURE — 96375 TX/PRO/DX INJ NEW DRUG ADDON: CPT

## 2024-08-20 PROCEDURE — 99417 PROLNG OP E/M EACH 15 MIN: CPT | Performed by: INTERNAL MEDICINE

## 2024-08-20 PROCEDURE — 82247 BILIRUBIN TOTAL: CPT | Mod: ZL | Performed by: INTERNAL MEDICINE

## 2024-08-20 PROCEDURE — 96411 CHEMO IV PUSH ADDL DRUG: CPT

## 2024-08-20 PROCEDURE — 96367 TX/PROPH/DG ADDL SEQ IV INF: CPT

## 2024-08-20 RX ORDER — DEXAMETHASONE 4 MG/1
8 TABLET ORAL DAILY
Qty: 20 TABLET | Refills: 2 | Status: SHIPPED | OUTPATIENT
Start: 2024-08-20

## 2024-08-20 RX ORDER — HEPARIN SODIUM (PORCINE) LOCK FLUSH IV SOLN 100 UNIT/ML 100 UNIT/ML
5 SOLUTION INTRAVENOUS
Status: CANCELLED | OUTPATIENT
Start: 2024-08-20

## 2024-08-20 RX ORDER — LORAZEPAM 2 MG/ML
0.5 INJECTION INTRAMUSCULAR EVERY 4 HOURS PRN
Status: CANCELLED | OUTPATIENT
Start: 2024-08-20

## 2024-08-20 RX ORDER — HEPARIN SODIUM,PORCINE 10 UNIT/ML
5-20 VIAL (ML) INTRAVENOUS DAILY PRN
Status: CANCELLED | OUTPATIENT
Start: 2024-08-22

## 2024-08-20 RX ORDER — EPINEPHRINE 1 MG/ML
0.3 INJECTION, SOLUTION, CONCENTRATE INTRAVENOUS EVERY 5 MIN PRN
Status: CANCELLED | OUTPATIENT
Start: 2024-08-20

## 2024-08-20 RX ORDER — MEPERIDINE HYDROCHLORIDE 25 MG/ML
25 INJECTION INTRAMUSCULAR; INTRAVENOUS; SUBCUTANEOUS EVERY 30 MIN PRN
Status: CANCELLED | OUTPATIENT
Start: 2024-08-20

## 2024-08-20 RX ORDER — HEPARIN SODIUM,PORCINE 10 UNIT/ML
5-20 VIAL (ML) INTRAVENOUS DAILY PRN
Status: CANCELLED | OUTPATIENT
Start: 2024-08-20

## 2024-08-20 RX ORDER — ALBUTEROL SULFATE 90 UG/1
1-2 AEROSOL, METERED RESPIRATORY (INHALATION)
Status: CANCELLED
Start: 2024-08-20

## 2024-08-20 RX ORDER — METHYLPREDNISOLONE SODIUM SUCCINATE 125 MG/2ML
125 INJECTION, POWDER, LYOPHILIZED, FOR SOLUTION INTRAMUSCULAR; INTRAVENOUS
Status: CANCELLED
Start: 2024-08-20

## 2024-08-20 RX ORDER — HEPARIN SODIUM (PORCINE) LOCK FLUSH IV SOLN 100 UNIT/ML 100 UNIT/ML
5 SOLUTION INTRAVENOUS
Status: CANCELLED | OUTPATIENT
Start: 2024-08-22

## 2024-08-20 RX ORDER — ONDANSETRON 2 MG/ML
8 INJECTION INTRAMUSCULAR; INTRAVENOUS ONCE
Status: COMPLETED | OUTPATIENT
Start: 2024-08-20 | End: 2024-08-20

## 2024-08-20 RX ORDER — ALBUTEROL SULFATE 0.83 MG/ML
2.5 SOLUTION RESPIRATORY (INHALATION)
Status: CANCELLED | OUTPATIENT
Start: 2024-08-20

## 2024-08-20 RX ORDER — DIPHENHYDRAMINE HYDROCHLORIDE 50 MG/ML
50 INJECTION INTRAMUSCULAR; INTRAVENOUS
Status: CANCELLED
Start: 2024-08-20

## 2024-08-20 RX ORDER — FLUOROURACIL 50 MG/ML
400 INJECTION, SOLUTION INTRAVENOUS ONCE
Status: CANCELLED | OUTPATIENT
Start: 2024-08-20

## 2024-08-20 RX ORDER — FLUOROURACIL 50 MG/ML
400 INJECTION, SOLUTION INTRAVENOUS ONCE
Status: COMPLETED | OUTPATIENT
Start: 2024-08-20 | End: 2024-08-20

## 2024-08-20 RX ADMIN — FLUOROURACIL 750 MG: 50 INJECTION, SOLUTION INTRAVENOUS at 13:40

## 2024-08-20 RX ADMIN — LEUCOVORIN CALCIUM 750 MG: 350 INJECTION, POWDER, LYOPHILIZED, FOR SUSPENSION INTRAMUSCULAR; INTRAVENOUS at 11:28

## 2024-08-20 RX ADMIN — OXALIPLATIN 150 MG: 5 INJECTION, SOLUTION INTRAVENOUS at 11:28

## 2024-08-20 RX ADMIN — FOSAPREPITANT: 150 INJECTION, POWDER, LYOPHILIZED, FOR SOLUTION INTRAVENOUS at 10:39

## 2024-08-20 RX ADMIN — ONDANSETRON 8 MG: 2 INJECTION INTRAMUSCULAR; INTRAVENOUS at 10:29

## 2024-08-20 RX ADMIN — DEXTROSE MONOHYDRATE 250 ML: 50 INJECTION, SOLUTION INTRAVENOUS at 10:28

## 2024-08-20 ASSESSMENT — PAIN SCALES - GENERAL: PAINLEVEL: NO PAIN (1)

## 2024-08-20 NOTE — PROGRESS NOTES
[facility]: Cancer Care                                                                                        Orders sent to [facility] for pump removal. Patient is requesting an earlier time a the pump was completed by noon that past 2 infusions and he had to listen to the beeping for hours. Unfortunately they are not able to take him any sooner than 3.       Signature:  Nano Verma RN

## 2024-08-20 NOTE — PROGRESS NOTES
Patient arrived to unit prior to Dr Poon appt. Did not have labs from port encounter, did speak with Luiz yesterday re this and it was felt MD did not need labs prior, could be obtained on unit. However, since patient came here first, port accessed and labs taken per orders (call to Dr Poon to obtain TORB to sign treatment plan labs under her). Patient port left accessed for this infusion appt. Patient declined assist to ONC appt.       Patient returned pump to us today, from his last infusion, as it was taken off in [place].

## 2024-08-20 NOTE — PROGRESS NOTES
MEDICAL ONCOLOGY FOLLOW-UP NOTE  Aug 20, 2024    Reason for follow-up: Rectal cancer    HISTORY OF PRESENT ILLNESS  Tal Mcbride is a 68 year old male with PMH as stated below who is seen in the oncology clinic for rectal cancer.    His history in short is as follows:    Mr. Mcbride started having diarrhea in the end of March 2024.  He was evaluated by his primary care and recommended to undergo a colonoscopy and upper endoscopy.    4/29/2024: EGD: 3 cm hiatal hernia.  Normal stomach.  Normal third portion of the duodenum.  Nonobstructing Schatzki's ring.    4/29/2024: Sigmoidoscopy: The digital rectal exam revealed a soft tissue mass palpated 8 cm from the anal verge.  The mass was noncircumferential and located predominantly at the posterior bowel wall.  An infiltrative partially obstructing large mass was found in the rectum.  The mass was circumferential.  The mass measured 4 cm in length.  No bleeding was present.  This was biopsied.  A pediatric colonoscope was passed past the area but patient had a large quantity of stool present just above the stricture so colonoscopy was not able to be completed.    Biopsy of the rectal mass showed moderately to poorly differentiated adenocarcinoma.    5/2/2024 CT chest with contrast: No definite findings for metastatic disease in the chest. 2 small pulmonary nodules measuring up to 4 mm unchanged from prior study     5/2/2024: MRI pelvis with and without contrast: A carcinoma involving the lower, middle and upper rectum extending over a length of roughly 12 cm demonstrated.  This lesion involves the mesorectum with no significant invasion anterolaterally on the right at the level of the mid rectum.  At this site infiltrative neoplasm is contactless with the posterior margin of the right seminal vesicle but it is not clearly invaded.  Neoplasm extends roughly 1.5 cm beyond the rectal margin.  Numerous enlarged mesorectal lymph nodes are demonstrated.  The larger mesorectal  lymph node is demonstrated on the right measuring 1.8X 1.5 cm.  Additional mesorectal lymph nodes with short axis measurements at least 1 cm demonstrated.  Inferior mesenteric lymph nodes with short axis measurement of 1.1 cm X 1.0 cm are noted.  A left internal iliac node measuring 1.2X 0.8 cm is demonstrated.  A 2.7 X1.8 centimeter right iliac node is demonstrated.  The other visualized bowel is unremarkable.  The prostate is mildly enlarged.  No free fluid is demonstrated.    5/13/2024: CT abdomen and pelvis without contrast: Large infiltrative rectal mass involving the upper middle and lower rectum.  There is surrounding inflammatory change and pelvic free fluid.  There is adjacent mesorectal adenopathy, a left internal iliac node as well.  Large amount of stool throughout the colon.  Enlarged prostate gland.  Left probable UPJ obstruction.    6/6/2024: Diverting colostomy    7/24/2024: Cycle 1 of FOLFOX    7/25/2024: PET scan: Masslike thickening with associated FDG avidity throughout the rectum is consistent with given history.  Metastatic disease within the liver, retroperitoneal nodes, perirectal nodes and inguinal nodes.  Findings are concerning for a UPJ obstruction.  Nuclear medicine renal Lasix scan for further characterize.    Interim history:    Mr. Mcbride is doing well today.  He denies any worsening nausea or diarrhea. Pain is much better and he has not required pain medication.     REVIEW OF SYSTEMS  A 12-point ROS negative except as in HPI      Current Outpatient Medications   Medication Sig Dispense Refill    aspirin 81 MG EC tablet Take 81 mg by mouth daily      atorvastatin (LIPITOR) 20 MG tablet Take 20 mg by mouth daily      dexAMETHasone (DECADRON) 4 MG tablet Take 2 tablets (8 mg) by mouth daily Take for 2 days, starting the day after chemo. Take with food. 20 tablet 2    hydrochlorothiazide (HYDRODIURIL) 25 MG tablet Take 25 mg by mouth daily      lidocaine-prilocaine (EMLA) 2.5-2.5 %  external cream Apply topically daily as needed for moderate pain 30 g 1    lisinopril (ZESTRIL) 40 MG tablet Take 40 mg by mouth daily      loperamide (IMODIUM A-D) 2 MG tablet Take 1 tablet (2 mg) by mouth 4 times daily as needed for diarrhea 90 tablet 0    metFORMIN (GLUCOPHAGE-XR) 750 MG 24 hr tablet Take 750 mg by mouth 2 times daily (with meals)      multivitamin w/minerals (MULTI-VITAMIN) tablet Take 1 tablet by mouth daily      ondansetron (ZOFRAN) 8 MG tablet Take 1 tablet (8 mg) by mouth every 8 hours as needed for nausea (vomiting) 30 tablet 2    Oxymetazoline HCl (NASAL DECONGESTANT SPRAY NA) Spray in nostril as needed      Polyethylene Glycol 3350 (MIRALAX PO) Take 1 Capful by mouth daily      prochlorperazine (COMPAZINE) 5 MG tablet Take 1 tablet (5 mg) by mouth every 6 hours as needed for nausea or vomiting 90 tablet 0    acetaminophen (TYLENOL) 500 MG tablet Take 650 mg by mouth every 6 hours as needed for mild pain (Patient not taking: Reported on 8/20/2024)      oxyCODONE-acetaminophen (PERCOCET) 5-325 MG tablet Take 1 tablet by mouth every 6 hours as needed for pain 120 tablet 0    traMADol (ULTRAM) 50 MG tablet Take 1 tablet (50 mg) by mouth every 6 hours as needed for severe pain 40 tablet 0    traZODone (DESYREL) 100 MG tablet Take 100 mg by mouth at bedtime (Patient not taking: Reported on 8/6/2024)         No Known Allergies  Immunization History   Administered Date(s) Administered    COVID-19 Bivalent 12+ (Pfizer) 10/27/2022    COVID-19 Monovalent 18+ (Moderna) 01/16/2021, 02/13/2021, 11/29/2021    Hepatitis B, Adult 10/03/2013, 03/28/2014, 12/30/2014    Influenza Vaccine (Flucelvax Quadrivalent) 10/07/2022    Pneumococcal 23 valent 10/03/2013    TDAP (Adacel,Boostrix) 10/03/2012       Past Medical History:   Diagnosis Date    Adolescent idiopathic scoliosis of thoracolumbar region 11/16/2017    Bunion 03/28/2013    Essential hypertension 03/05/2012    Formatting of this note might be  different from the original.   Epic      Pure hypercholesterolemia 10/04/2012    Rectal cancer (H) 05/29/2024    Severe protein-calorie malnutrition (H24) 06/07/2024    Type 2 diabetes mellitus without complication (H) 06/21/2012       Past Surgical History:   Procedure Laterality Date    CATARACT EXTRACTION W/ INTRAOCULAR LENS IMPLANT Left 2021    ENDOSCOPY N/A     upper and lower    GI SURGERY      PHACOEMULSIFICATION WITH STANDARD INTRAOCULAR LENS IMPLANT Right 09/29/2021    Procedure: right Cataract Extraction with Implant;  Surgeon: Juan Prado MD;  Location: [facility] OR    robotic colostomy N/A 06/06/2024       SOCIAL HISTORY  History   Smoking Status    Former    Types: Cigarettes   Smokeless Tobacco    Former    Social History    Substance and Sexual Activity      Alcohol use: Not Currently     History   Drug Use Unknown       FAMILY HISTORY  Family History   Problem Relation Age of Onset    Pancreatic Cancer Father 55       PHYSICAL EXAMINATION  /58   Pulse 62   Temp 96.8  F (36  C) (Tympanic)   Wt 72.4 kg (159 lb 9.8 oz)   SpO2 99%   BMI 23.57 kg/m    Wt Readings from Last 2 Encounters:   08/20/24 72.4 kg (159 lb 9.8 oz)   08/06/24 70.9 kg (156 lb 4.9 oz)     Physical Exam  Constitutional:       Appearance: Normal appearance.   Abdominal:      General: Abdomen is flat.      Comments: Prolapsed bowel seen from ostomy.   Neurological:      General: No focal deficit present.      Mental Status: He is alert and oriented to person, place, and time.   Psychiatric:         Mood and Affect: Mood normal.     Laboratory and imaging:   Latest Reference Range & Units 08/20/24 09:05   WBC 4.0 - 11.0 10e3/uL 8.4   Hemoglobin 13.3 - 17.7 g/dL 11.7 (L)   Hematocrit 40.0 - 53.0 % 33.6 (L)   Platelet Count 150 - 450 10e3/uL 251   (L): Data is abnormally low    Foundation one: no reportable RNA variants EGFR amplification KRAS wildtype NRAS wildtype APC * ARIDIA * TP53 splice site [id number]>G Disease  relevant genes with no reportable alterations: BRAF, ERBB2, KRAS, NRAS.  ASSESSMENT AND PLAN    1.  Stage IV rectal cancer:    Initially presented with diarrhea in March 2024.  Sigmoidoscopy showed rectal mass infiltrative partially obstructing large mass 8 cm from the anal verge.  MRI pelvis showed carcinoma involving lower, middle and upper rectum extending over a length of roughly 12 cm.  Lesion involves the mesorectum but most significant invasion anterolaterally on the right at the level of the mid rectum.  Neoplasm is contiguous with the posterior margin of the right seminal vesicle but is not clearly invading it.  Extends roughly 1.5 cm beyond the rectal margin.  It also showed numerous enlarged mesorectal lymph nodes.  A inferior mesenteric lymph node was also noted.  There were also enlarged left iliac nodes and right iliac node.    He has already had a diverting ostomy by  on 6/6/2024.    Since his last visit his PET scan has now shown metastatic disease.  We discussed that at this point we would continue with chemotherapy.  We will restage him in 6 cycles.  I will also discuss his case with his surgeon given his new findings on PET scan.   PET scan showed 5 foci of abnormal avidity present within the liver, involvement of the common iliac lymph node, upper abdominal para-aortic lymph node, right common iliac node and left perirectal node.  A few inguinal nodes are also avid.    Currently on palliative chemotherapy with FOLFOX.  He is status post cycle 2.  Has noticed significant improvement in his rectal pain.  He feels he does better when he is not constipated also.    His foundation 1 results are available today.  They do not show any mutations in KRAS, NRAS or BRAF.  The sample did have low tumor purity.  We discussed the addition of bevacizumab today.  We discussed the expected side effects of hemorrhage, high blood pressure, blood clot, fistula,.      Written material was also given.  He  will let us know if he is agreeable to proceed.  If so we will arrange for chemo teaching and changes treatment plan to FOLFOX with bevacizumab.    Follow-up in clinic prior to cycle 4.    Total time spent on the patient on day of encounter was 80 minutes doing chart review, review of test results, interpretation of results, patient visit and documentation.       Shawna Poon MD

## 2024-08-20 NOTE — PROGRESS NOTES
Care Coordination Focused Note:    Met w/pt and spouse in person during infusion appt. Assisted filling out supporting paperwork for [employer] cash and food assistance.  Writer sent paperwork via secure email to [facility].    Pt's spouse, Gayle, notes they've not yet heard from [employer]. Was advised from her that they go over applications on the 15th.  Suggested waiting a few more days since, if they mailed gifts, it most likely was mailed out on 8/16 and it takes a few days to be received in the mail.  Writer will follow-up with [employer] at a later time.    Plan to meet with pt and spouse again on 8/22/24.

## 2024-08-20 NOTE — NURSING NOTE
"Oncology Rooming Note    August 20, 2024 9:32 AM   Tal Mcbride is a 68 year old male who presents for:    Chief Complaint   Patient presents with    Oncology Clinic Visit     Follow up Rectal cancer     Initial Vitals: /58   Pulse 62   Temp 96.8  F (36  C) (Tympanic)   Wt 72.4 kg (159 lb 9.8 oz)   SpO2 99%   BMI 23.57 kg/m   Estimated body mass index is 23.57 kg/m  as calculated from the following:    Height as of 8/6/24: 1.753 m (5' 9\").    Weight as of this encounter: 72.4 kg (159 lb 9.8 oz). Body surface area is 1.88 meters squared.  No Pain (1) Comment: butt  No LMP for male patient.  Allergies reviewed: Yes  Medications reviewed: Yes    Medications: Medication refills not needed today.  Pharmacy name entered into [employer]: [facility] [id number]    Frailty Screening:   Is the patient here for a new oncology consult visit in cancer care? 2. No      Clinical concerns: none       Angie Amaya LPN             "

## 2024-08-20 NOTE — PROGRESS NOTES
Post Infusion Assessment:  Patient tolerated infusion without incident.  Blood return noted pre and post infusion.  Site patent and intact, free from redness, edema or discomfort.  No evidence of extravasations.   Home pump put on. Pt will go to [facility] to have pump off.       Discharge Plan:   Patient and/or family verbalized understanding of discharge instructions and all questions answered.      KRISTINA SIERRA RN

## 2024-08-20 NOTE — PROGRESS NOTES
Infusion Nursing Note:  Tal Mcbride presents today for FOLFOX.    Patient seen by provider today: Yes: Dr Poon  Home medications, allergies, vitals, fall risk, pain and abuse screen done at [facility] appt earlier this date. All reviewed by this nurse prior to treating. Patient denies questions or concerns not already discussed with provider prior, wishes to proceed with treatment.    present during visit today: Not Applicable.    Note: Spoke with [facility] re patient request for 1200 pump off appt at [place]. Per Nano, she did attempt last time and with this treatment for a time closer to noon but they are unable to accommodate and 1500 is the only time. Patient accepting.       Treatment Conditions:  Lab Results   Component Value Date    HGB 11.7 (L) 08/20/2024    WBC 8.4 08/20/2024    ANEUTAUTO 5.2 08/20/2024     08/20/2024        Lab Results   Component Value Date     08/20/2024    POTASSIUM 4.3 08/20/2024    CR 0.93 08/20/2024    RAUDEL 9.5 08/20/2024    BILITOTAL 0.5 08/20/2024    ALBUMIN 4.3 08/20/2024    ALT 17 08/20/2024    AST 15 08/20/2024       Results reviewed, labs MET treatment parameters, ok to proceed with treatment.      Scanned to secure email the pump ticket, for [facility].

## 2024-08-21 ENCOUNTER — TELEPHONE (OUTPATIENT)
Dept: CARE COORDINATION | Facility: OTHER | Age: 69
End: 2024-08-21

## 2024-08-21 NOTE — CONFIDENTIAL NOTE
Care Coordination Focused Note:    Called and spoke with pt's spouse, Gayle.  Asked for her to provide a 1040 federal tax form to submit with pt's [facility] application and [facility]'s financial assistance program application.  Their plan is to provide it at pt's Tuesday, 9/3 appt.

## 2024-08-23 ENCOUNTER — DOCUMENTATION ONLY (OUTPATIENT)
Dept: CARE COORDINATION | Facility: OTHER | Age: 69
End: 2024-08-23

## 2024-08-23 NOTE — CONFIDENTIAL NOTE
Care Coordination Focused Note:    Rcvd incoming fax from pt's spouse with patient and spouse's 1040 federal tax forms from 2023.  Writer added these to patient's [employer] and [facility] [employer] Care applications. [employer] Care application via interdepartmental mail and [facility] [employer] Community Care application via secure email.

## 2024-08-30 DIAGNOSIS — C20 RECTAL CANCER (H): Primary | ICD-10-CM

## 2024-08-30 RX ORDER — HEPARIN SODIUM (PORCINE) LOCK FLUSH IV SOLN 100 UNIT/ML 100 UNIT/ML
5 SOLUTION INTRAVENOUS
Status: CANCELLED | OUTPATIENT
Start: 2024-09-03

## 2024-08-30 RX ORDER — METHYLPREDNISOLONE SODIUM SUCCINATE 125 MG/2ML
125 INJECTION, POWDER, LYOPHILIZED, FOR SOLUTION INTRAMUSCULAR; INTRAVENOUS
Status: CANCELLED
Start: 2024-09-03

## 2024-08-30 RX ORDER — EPINEPHRINE 1 MG/ML
0.3 INJECTION, SOLUTION, CONCENTRATE INTRAVENOUS EVERY 5 MIN PRN
Status: CANCELLED | OUTPATIENT
Start: 2024-09-03

## 2024-08-30 RX ORDER — HEPARIN SODIUM (PORCINE) LOCK FLUSH IV SOLN 100 UNIT/ML 100 UNIT/ML
5 SOLUTION INTRAVENOUS
Status: CANCELLED | OUTPATIENT
Start: 2024-09-05

## 2024-08-30 RX ORDER — FLUOROURACIL 50 MG/ML
400 INJECTION, SOLUTION INTRAVENOUS ONCE
Status: CANCELLED | OUTPATIENT
Start: 2024-09-03

## 2024-08-30 RX ORDER — HEPARIN SODIUM,PORCINE 10 UNIT/ML
5-20 VIAL (ML) INTRAVENOUS DAILY PRN
Status: CANCELLED | OUTPATIENT
Start: 2024-09-03

## 2024-08-30 RX ORDER — ALBUTEROL SULFATE 0.83 MG/ML
2.5 SOLUTION RESPIRATORY (INHALATION)
Status: CANCELLED | OUTPATIENT
Start: 2024-09-03

## 2024-08-30 RX ORDER — MEPERIDINE HYDROCHLORIDE 50 MG/ML
25 INJECTION INTRAMUSCULAR; INTRAVENOUS; SUBCUTANEOUS EVERY 30 MIN PRN
Status: CANCELLED | OUTPATIENT
Start: 2024-09-03

## 2024-08-30 RX ORDER — ALBUTEROL SULFATE 90 UG/1
1-2 AEROSOL, METERED RESPIRATORY (INHALATION)
Status: CANCELLED
Start: 2024-09-03

## 2024-08-30 RX ORDER — HEPARIN SODIUM,PORCINE 10 UNIT/ML
5-20 VIAL (ML) INTRAVENOUS DAILY PRN
Status: CANCELLED | OUTPATIENT
Start: 2024-09-05

## 2024-08-30 RX ORDER — LORAZEPAM 2 MG/ML
0.5 INJECTION INTRAMUSCULAR EVERY 4 HOURS PRN
Status: CANCELLED | OUTPATIENT
Start: 2024-09-03

## 2024-08-30 RX ORDER — DIPHENHYDRAMINE HYDROCHLORIDE 50 MG/ML
50 INJECTION INTRAMUSCULAR; INTRAVENOUS
Status: CANCELLED
Start: 2024-09-03

## 2024-09-03 ENCOUNTER — PATIENT OUTREACH (OUTPATIENT)
Dept: ONCOLOGY | Facility: OTHER | Age: 69
End: 2024-09-03

## 2024-09-03 ENCOUNTER — INFUSION THERAPY VISIT (OUTPATIENT)
Dept: INFUSION THERAPY | Facility: OTHER | Age: 69
End: 2024-09-03
Attending: INTERNAL MEDICINE
Payer: COMMERCIAL

## 2024-09-03 VITALS
SYSTOLIC BLOOD PRESSURE: 124 MMHG | WEIGHT: 167.11 LBS | TEMPERATURE: 97.2 F | OXYGEN SATURATION: 98 % | DIASTOLIC BLOOD PRESSURE: 64 MMHG | RESPIRATION RATE: 18 BRPM | HEART RATE: 67 BPM | BODY MASS INDEX: 24.68 KG/M2

## 2024-09-03 DIAGNOSIS — C20 RECTAL CANCER (H): Primary | ICD-10-CM

## 2024-09-03 LAB
ALBUMIN SERPL BCG-MCNC: 4.1 G/DL (ref 3.5–5.2)
ALP SERPL-CCNC: 98 U/L (ref 40–150)
ALT SERPL W P-5'-P-CCNC: 20 U/L (ref 0–70)
ANION GAP SERPL CALCULATED.3IONS-SCNC: 13 MMOL/L (ref 7–15)
AST SERPL W P-5'-P-CCNC: 15 U/L (ref 0–45)
BASOPHILS # BLD AUTO: 0.1 10E3/UL (ref 0–0.2)
BASOPHILS NFR BLD AUTO: 1 %
BILIRUB SERPL-MCNC: 0.4 MG/DL
BUN SERPL-MCNC: 23.5 MG/DL (ref 8–23)
CALCIUM SERPL-MCNC: 9.8 MG/DL (ref 8.8–10.4)
CHLORIDE SERPL-SCNC: 101 MMOL/L (ref 98–107)
CREAT SERPL-MCNC: 0.96 MG/DL (ref 0.67–1.17)
EGFRCR SERPLBLD CKD-EPI 2021: 86 ML/MIN/1.73M2
EOSINOPHIL # BLD AUTO: 0.2 10E3/UL (ref 0–0.7)
EOSINOPHIL NFR BLD AUTO: 2 %
ERYTHROCYTE [DISTWIDTH] IN BLOOD BY AUTOMATED COUNT: 14.8 % (ref 10–15)
GLUCOSE SERPL-MCNC: 168 MG/DL (ref 70–99)
HCO3 SERPL-SCNC: 25 MMOL/L (ref 22–29)
HCT VFR BLD AUTO: 33.1 % (ref 40–53)
HGB BLD-MCNC: 11.4 G/DL (ref 13.3–17.7)
IMM GRANULOCYTES # BLD: 0 10E3/UL
IMM GRANULOCYTES NFR BLD: 1 %
LYMPHOCYTES # BLD AUTO: 1.9 10E3/UL (ref 0.8–5.3)
LYMPHOCYTES NFR BLD AUTO: 23 %
MCH RBC QN AUTO: 31.3 PG (ref 26.5–33)
MCHC RBC AUTO-ENTMCNC: 34.4 G/DL (ref 31.5–36.5)
MCV RBC AUTO: 91 FL (ref 78–100)
MONOCYTES # BLD AUTO: 0.9 10E3/UL (ref 0–1.3)
MONOCYTES NFR BLD AUTO: 11 %
NEUTROPHILS # BLD AUTO: 5.3 10E3/UL (ref 1.6–8.3)
NEUTROPHILS NFR BLD AUTO: 63 %
NRBC # BLD AUTO: 0 10E3/UL
NRBC BLD AUTO-RTO: 0 /100
PLATELET # BLD AUTO: 172 10E3/UL (ref 150–450)
POTASSIUM SERPL-SCNC: 4.2 MMOL/L (ref 3.4–5.3)
PROT SERPL-MCNC: 6.4 G/DL (ref 6.4–8.3)
RBC # BLD AUTO: 3.64 10E6/UL (ref 4.4–5.9)
SODIUM SERPL-SCNC: 139 MMOL/L (ref 135–145)
WBC # BLD AUTO: 8.4 10E3/UL (ref 4–11)

## 2024-09-03 PROCEDURE — 96413 CHEMO IV INFUSION 1 HR: CPT

## 2024-09-03 PROCEDURE — 96368 THER/DIAG CONCURRENT INF: CPT

## 2024-09-03 PROCEDURE — 82040 ASSAY OF SERUM ALBUMIN: CPT | Mod: ZL | Performed by: INTERNAL MEDICINE

## 2024-09-03 PROCEDURE — 96375 TX/PRO/DX INJ NEW DRUG ADDON: CPT

## 2024-09-03 PROCEDURE — 36591 DRAW BLOOD OFF VENOUS DEVICE: CPT | Performed by: INTERNAL MEDICINE

## 2024-09-03 PROCEDURE — 258N000003 HC RX IP 258 OP 636: Performed by: INTERNAL MEDICINE

## 2024-09-03 PROCEDURE — 96411 CHEMO IV PUSH ADDL DRUG: CPT

## 2024-09-03 PROCEDURE — 96521 REFILL/MAINT PORTABLE PUMP: CPT | Mod: 59

## 2024-09-03 PROCEDURE — 96415 CHEMO IV INFUSION ADDL HR: CPT

## 2024-09-03 PROCEDURE — 250N000011 HC RX IP 250 OP 636: Performed by: INTERNAL MEDICINE

## 2024-09-03 PROCEDURE — 85025 COMPLETE CBC W/AUTO DIFF WBC: CPT | Mod: ZL | Performed by: INTERNAL MEDICINE

## 2024-09-03 PROCEDURE — 96367 TX/PROPH/DG ADDL SEQ IV INF: CPT

## 2024-09-03 RX ORDER — FLUOROURACIL 50 MG/ML
400 INJECTION, SOLUTION INTRAVENOUS ONCE
Status: COMPLETED | OUTPATIENT
Start: 2024-09-03 | End: 2024-09-03

## 2024-09-03 RX ORDER — ONDANSETRON 2 MG/ML
8 INJECTION INTRAMUSCULAR; INTRAVENOUS ONCE
Status: COMPLETED | OUTPATIENT
Start: 2024-09-03 | End: 2024-09-03

## 2024-09-03 RX ADMIN — FLUOROURACIL 750 MG: 50 INJECTION, SOLUTION INTRAVENOUS at 14:56

## 2024-09-03 RX ADMIN — LEUCOVORIN CALCIUM 750 MG: 350 INJECTION, POWDER, LYOPHILIZED, FOR SUSPENSION INTRAMUSCULAR; INTRAVENOUS at 12:42

## 2024-09-03 RX ADMIN — ONDANSETRON 8 MG: 2 INJECTION INTRAMUSCULAR; INTRAVENOUS at 10:40

## 2024-09-03 RX ADMIN — OXALIPLATIN 150 MG: 5 INJECTION, SOLUTION INTRAVENOUS at 12:42

## 2024-09-03 RX ADMIN — DEXTROSE MONOHYDRATE 250 ML: 50 INJECTION, SOLUTION INTRAVENOUS at 10:39

## 2024-09-03 RX ADMIN — FOSAPREPITANT: 150 INJECTION, POWDER, LYOPHILIZED, FOR SOLUTION INTRAVENOUS at 10:44

## 2024-09-03 NOTE — PROGRESS NOTES
Infusion Nursing Note:  Tal WIGGINS  presents today for R-CEOP.    Patient seen by provider today: No   present during visit today: Not Applicable.    Note: patient denies changes to health hx, home meds, or allergies since provider appt 8/30/24.    Intravenous Access:  Implanted Port.  Labs drawn without difficulty from implanted port,    Treatment Conditions:  Results reviewed, labs MET treatment parameters, ok to proceed with treatment.    Component      Latest Ref Rng 9/3/2024  9:57 AM   WBC      4.0 - 11.0 10e3/uL 8.4    RBC Count      4.40 - 5.90 10e6/uL 3.64 (L)    Hemoglobin      13.3 - 17.7 g/dL 11.4 (L)    Hematocrit      40.0 - 53.0 % 33.1 (L)    MCV      78 - 100 fL 91    MCH      26.5 - 33.0 pg 31.3    MCHC      31.5 - 36.5 g/dL 34.4    RDW      10.0 - 15.0 % 14.8    Platelet Count      150 - 450 10e3/uL 172    % Neutrophils      % 63    % Lymphocytes      % 23    % Monocytes      % 11    % Eosinophils      % 2    % Basophils      % 1    % Immature Granulocytes      % 1    NRBCs per 100 WBC      <1 /100 0    Absolute Neutrophils      1.6 - 8.3 10e3/uL 5.3    Absolute Lymphocytes      0.8 - 5.3 10e3/uL 1.9    Absolute Monocytes      0.0 - 1.3 10e3/uL 0.9    Absolute Eosinophils      0.0 - 0.7 10e3/uL 0.2    Absolute Basophils      0.0 - 0.2 10e3/uL 0.1    Absolute Immature Granulocytes      <=0.4 10e3/uL 0.0    Absolute NRBCs      10e3/uL 0.0    Sodium      135 - 145 mmol/L 139    Potassium      3.4 - 5.3 mmol/L 4.2    Carbon Dioxide (CO2)      22 - 29 mmol/L 25    Anion Gap      7 - 15 mmol/L 13    Urea Nitrogen      8.0 - 23.0 mg/dL 23.5 (H)    Creatinine      0.67 - 1.17 mg/dL 0.96    GFR Estimate      >60 mL/min/1.73m2 86    Calcium      8.8 - 10.4 mg/dL 9.8    Chloride      98 - 107 mmol/L 101    Glucose      70 - 99 mg/dL 168 (H)    Alkaline Phosphatase      40 - 150 U/L 98    AST      0 - 45 U/L 15    ALT      0 - 70 U/L 20    Protein Total      6.4 - 8.3 g/dL 6.4    Albumin      3.5 -  "5.2 g/dL 4.1    Bilirubin Total      <=1.2 mg/dL 0.4       Legend:  (L) Low  (H) High    Post Infusion Assessment:  Patient tolerated infusion without incident.  Blood return noted pre and post infusion.  Site patent and intact, free from redness, edema or discomfort.  No evidence of extravasations.     Prior to discharge: Port is secured in place with tegaderm and flushed with 10cc NS with positive blood return noted.    Continuous home infusion CADD pump connected.    All connectors secured in place and clamps taped open.    Pump started, \"running\" noted on display (CADD): YES.   Patient instructed to call our clinic or [facility] with any questions or concerns at home.  Patient verbalized understanding.    Patient set up for pump disconnect [facility] on 9/5/24.       Discharge Plan:   AVS to patient via MYCHART.  Patient will return as scheduled for next appointment.   Patient discharged in stable condition accompanied by: wife.  Departure Mode: Ambulatory.    [facility] delivery ticket secured emailed to [facility] and [facility].    [facility] delivery ticket sent to scanning.      "

## 2024-09-03 NOTE — PROGRESS NOTES
[facility]: Cancer Care                                                                                        Tc to [facility] infusion center to set patient up for pump removal. They will be able to remove it Thursday at 3:00 pm. Infusion updated and will report to patient. Infusion center is requesting a standing order for pump removal.       Signature:  Nano Verma RN

## 2024-09-05 ENCOUNTER — ENROLLMENT (OUTPATIENT)
Dept: HOME HEALTH SERVICES | Facility: HOME HEALTH | Age: 69
End: 2024-09-05
Payer: MEDICARE

## 2024-09-05 ENCOUNTER — ENROLLMENT (OUTPATIENT)
Dept: HOME HEALTH SERVICES | Facility: HOME HEALTH | Age: 69
End: 2024-09-05
Payer: COMMERCIAL

## 2024-09-17 ENCOUNTER — INFUSION THERAPY VISIT (OUTPATIENT)
Dept: INFUSION THERAPY | Facility: OTHER | Age: 69
End: 2024-09-17
Attending: INTERNAL MEDICINE
Payer: COMMERCIAL

## 2024-09-17 ENCOUNTER — CARE COORDINATION (OUTPATIENT)
Dept: CARE COORDINATION | Facility: OTHER | Age: 69
End: 2024-09-17

## 2024-09-17 ENCOUNTER — ONCOLOGY VISIT (OUTPATIENT)
Dept: ONCOLOGY | Facility: OTHER | Age: 69
End: 2024-09-17
Attending: INTERNAL MEDICINE
Payer: COMMERCIAL

## 2024-09-17 VITALS
OXYGEN SATURATION: 98 % | RESPIRATION RATE: 16 BRPM | BODY MASS INDEX: 25.52 KG/M2 | HEART RATE: 58 BPM | DIASTOLIC BLOOD PRESSURE: 68 MMHG | TEMPERATURE: 97.5 F | SYSTOLIC BLOOD PRESSURE: 140 MMHG | WEIGHT: 172.84 LBS

## 2024-09-17 DIAGNOSIS — C20 RECTAL CANCER (H): Primary | ICD-10-CM

## 2024-09-17 DIAGNOSIS — H53.9 VISUAL DISTURBANCE: ICD-10-CM

## 2024-09-17 DIAGNOSIS — T45.1X5A CHEMOTHERAPY-INDUCED NEUROPATHY (H): ICD-10-CM

## 2024-09-17 DIAGNOSIS — K94.19 INTESTINAL STOMA PROLAPSE (H): ICD-10-CM

## 2024-09-17 DIAGNOSIS — G62.0 CHEMOTHERAPY-INDUCED NEUROPATHY (H): ICD-10-CM

## 2024-09-17 DIAGNOSIS — Z91.89 AT RISK FOR COMPLICATION OF STOMA: ICD-10-CM

## 2024-09-17 LAB
ALBUMIN SERPL BCG-MCNC: 4.1 G/DL (ref 3.5–5.2)
ALP SERPL-CCNC: 85 U/L (ref 40–150)
ALT SERPL W P-5'-P-CCNC: 23 U/L (ref 0–70)
ANION GAP SERPL CALCULATED.3IONS-SCNC: 13 MMOL/L (ref 7–15)
AST SERPL W P-5'-P-CCNC: 21 U/L (ref 0–45)
BASOPHILS # BLD AUTO: 0.1 10E3/UL (ref 0–0.2)
BASOPHILS NFR BLD AUTO: 1 %
BILIRUB SERPL-MCNC: 0.4 MG/DL
BUN SERPL-MCNC: 18.8 MG/DL (ref 8–23)
CALCIUM SERPL-MCNC: 9.7 MG/DL (ref 8.8–10.4)
CEA SERPL-MCNC: 9 NG/ML
CHLORIDE SERPL-SCNC: 104 MMOL/L (ref 98–107)
CREAT SERPL-MCNC: 1.01 MG/DL (ref 0.67–1.17)
EGFRCR SERPLBLD CKD-EPI 2021: 81 ML/MIN/1.73M2
EOSINOPHIL # BLD AUTO: 0.2 10E3/UL (ref 0–0.7)
EOSINOPHIL NFR BLD AUTO: 2 %
ERYTHROCYTE [DISTWIDTH] IN BLOOD BY AUTOMATED COUNT: 16 % (ref 10–15)
GLUCOSE SERPL-MCNC: 152 MG/DL (ref 70–99)
HCO3 SERPL-SCNC: 23 MMOL/L (ref 22–29)
HCT VFR BLD AUTO: 33.6 % (ref 40–53)
HGB BLD-MCNC: 11.6 G/DL (ref 13.3–17.7)
HOLD SPECIMEN: NORMAL
IMM GRANULOCYTES # BLD: 0.1 10E3/UL
IMM GRANULOCYTES NFR BLD: 1 %
LYMPHOCYTES # BLD AUTO: 2.4 10E3/UL (ref 0.8–5.3)
LYMPHOCYTES NFR BLD AUTO: 23 %
MCH RBC QN AUTO: 31.9 PG (ref 26.5–33)
MCHC RBC AUTO-ENTMCNC: 34.5 G/DL (ref 31.5–36.5)
MCV RBC AUTO: 92 FL (ref 78–100)
MONOCYTES # BLD AUTO: 1 10E3/UL (ref 0–1.3)
MONOCYTES NFR BLD AUTO: 10 %
NEUTROPHILS # BLD AUTO: 6.6 10E3/UL (ref 1.6–8.3)
NEUTROPHILS NFR BLD AUTO: 64 %
NRBC # BLD AUTO: 0 10E3/UL
NRBC BLD AUTO-RTO: 0 /100
PLATELET # BLD AUTO: 163 10E3/UL (ref 150–450)
POTASSIUM SERPL-SCNC: 4.1 MMOL/L (ref 3.4–5.3)
PROT SERPL-MCNC: 6.6 G/DL (ref 6.4–8.3)
RBC # BLD AUTO: 3.64 10E6/UL (ref 4.4–5.9)
SODIUM SERPL-SCNC: 140 MMOL/L (ref 135–145)
WBC # BLD AUTO: 10.4 10E3/UL (ref 4–11)

## 2024-09-17 PROCEDURE — 99215 OFFICE O/P EST HI 40 MIN: CPT | Performed by: NURSE PRACTITIONER

## 2024-09-17 PROCEDURE — 250N000011 HC RX IP 250 OP 636: Mod: JZ | Performed by: NURSE PRACTITIONER

## 2024-09-17 PROCEDURE — 96367 TX/PROPH/DG ADDL SEQ IV INF: CPT

## 2024-09-17 PROCEDURE — 258N000003 HC RX IP 258 OP 636: Performed by: NURSE PRACTITIONER

## 2024-09-17 PROCEDURE — 96521 REFILL/MAINT PORTABLE PUMP: CPT

## 2024-09-17 PROCEDURE — 96415 CHEMO IV INFUSION ADDL HR: CPT

## 2024-09-17 PROCEDURE — 99417 PROLNG OP E/M EACH 15 MIN: CPT | Performed by: NURSE PRACTITIONER

## 2024-09-17 PROCEDURE — 85025 COMPLETE CBC W/AUTO DIFF WBC: CPT | Mod: ZL | Performed by: NURSE PRACTITIONER

## 2024-09-17 PROCEDURE — 250N000011 HC RX IP 250 OP 636: Mod: JZ | Performed by: INTERNAL MEDICINE

## 2024-09-17 PROCEDURE — 96368 THER/DIAG CONCURRENT INF: CPT

## 2024-09-17 PROCEDURE — 82378 CARCINOEMBRYONIC ANTIGEN: CPT | Mod: ZL

## 2024-09-17 PROCEDURE — 96413 CHEMO IV INFUSION 1 HR: CPT

## 2024-09-17 PROCEDURE — 96411 CHEMO IV PUSH ADDL DRUG: CPT

## 2024-09-17 PROCEDURE — 96375 TX/PRO/DX INJ NEW DRUG ADDON: CPT

## 2024-09-17 PROCEDURE — G0463 HOSPITAL OUTPT CLINIC VISIT: HCPCS | Mod: 25

## 2024-09-17 PROCEDURE — 36415 COLL VENOUS BLD VENIPUNCTURE: CPT | Mod: ZL

## 2024-09-17 PROCEDURE — 82040 ASSAY OF SERUM ALBUMIN: CPT | Mod: ZL | Performed by: NURSE PRACTITIONER

## 2024-09-17 RX ORDER — DIPHENHYDRAMINE HYDROCHLORIDE 50 MG/ML
50 INJECTION INTRAMUSCULAR; INTRAVENOUS
Status: CANCELLED
Start: 2024-09-17

## 2024-09-17 RX ORDER — LORAZEPAM 2 MG/ML
0.5 INJECTION INTRAMUSCULAR EVERY 4 HOURS PRN
Status: CANCELLED | OUTPATIENT
Start: 2024-09-17

## 2024-09-17 RX ORDER — METHYLPREDNISOLONE SODIUM SUCCINATE 125 MG/2ML
125 INJECTION, POWDER, LYOPHILIZED, FOR SOLUTION INTRAMUSCULAR; INTRAVENOUS
Status: CANCELLED
Start: 2024-09-17

## 2024-09-17 RX ORDER — HEPARIN SODIUM (PORCINE) LOCK FLUSH IV SOLN 100 UNIT/ML 100 UNIT/ML
5 SOLUTION INTRAVENOUS
Status: CANCELLED | OUTPATIENT
Start: 2024-09-19

## 2024-09-17 RX ORDER — ALBUTEROL SULFATE 0.83 MG/ML
2.5 SOLUTION RESPIRATORY (INHALATION)
Status: CANCELLED | OUTPATIENT
Start: 2024-09-17

## 2024-09-17 RX ORDER — HEPARIN SODIUM,PORCINE 10 UNIT/ML
5-20 VIAL (ML) INTRAVENOUS DAILY PRN
Status: CANCELLED | OUTPATIENT
Start: 2024-09-17

## 2024-09-17 RX ORDER — HEPARIN SODIUM,PORCINE 10 UNIT/ML
5-20 VIAL (ML) INTRAVENOUS DAILY PRN
Status: CANCELLED | OUTPATIENT
Start: 2024-09-19

## 2024-09-17 RX ORDER — ONDANSETRON 2 MG/ML
8 INJECTION INTRAMUSCULAR; INTRAVENOUS ONCE
Status: COMPLETED | OUTPATIENT
Start: 2024-09-17 | End: 2024-09-17

## 2024-09-17 RX ORDER — HEPARIN SODIUM (PORCINE) LOCK FLUSH IV SOLN 100 UNIT/ML 100 UNIT/ML
5 SOLUTION INTRAVENOUS
Status: CANCELLED | OUTPATIENT
Start: 2024-09-17

## 2024-09-17 RX ORDER — MEPERIDINE HYDROCHLORIDE 25 MG/ML
25 INJECTION INTRAMUSCULAR; INTRAVENOUS; SUBCUTANEOUS EVERY 30 MIN PRN
Status: CANCELLED | OUTPATIENT
Start: 2024-09-17

## 2024-09-17 RX ORDER — FLUOROURACIL 50 MG/ML
400 INJECTION, SOLUTION INTRAVENOUS ONCE
Status: CANCELLED | OUTPATIENT
Start: 2024-09-17

## 2024-09-17 RX ORDER — ALBUTEROL SULFATE 90 UG/1
1-2 AEROSOL, METERED RESPIRATORY (INHALATION)
Status: CANCELLED
Start: 2024-09-17

## 2024-09-17 RX ORDER — FLUOROURACIL 50 MG/ML
400 INJECTION, SOLUTION INTRAVENOUS ONCE
Status: COMPLETED | OUTPATIENT
Start: 2024-09-17 | End: 2024-09-17

## 2024-09-17 RX ORDER — EPINEPHRINE 1 MG/ML
0.3 INJECTION, SOLUTION, CONCENTRATE INTRAVENOUS EVERY 5 MIN PRN
Status: CANCELLED | OUTPATIENT
Start: 2024-09-17

## 2024-09-17 RX ADMIN — FLUOROURACIL 750 MG: 50 INJECTION, SOLUTION INTRAVENOUS at 12:37

## 2024-09-17 RX ADMIN — ONDANSETRON 8 MG: 2 INJECTION INTRAMUSCULAR; INTRAVENOUS at 09:28

## 2024-09-17 RX ADMIN — OXALIPLATIN 150 MG: 5 INJECTION, SOLUTION INTRAVENOUS at 10:29

## 2024-09-17 RX ADMIN — LEUCOVORIN CALCIUM 750 MG: 350 INJECTION, POWDER, LYOPHILIZED, FOR SUSPENSION INTRAMUSCULAR; INTRAVENOUS at 10:26

## 2024-09-17 RX ADMIN — DEXTROSE MONOHYDRATE 250 ML: 50 INJECTION, SOLUTION INTRAVENOUS at 09:28

## 2024-09-17 RX ADMIN — FOSAPREPITANT: 150 INJECTION, POWDER, LYOPHILIZED, FOR SOLUTION INTRAVENOUS at 09:40

## 2024-09-17 ASSESSMENT — PAIN SCALES - GENERAL: PAINLEVEL: NO PAIN (0)

## 2024-09-17 NOTE — PROGRESS NOTES
Oncology Follow-up Visit    Reason for Visit:  Ezra is a 69 year old man with a diagnosis of Stage IV rectal cancer, who presents to the clinic today for routine follow-up.    Nursing Note and documentation reviewed: Yes    Interval History: Ezra notes that he is doing reasonably well. Notes that discomfort he had at the time of his diagnosis has pretty much completely resolved. He also endorsed back pain at time of diagnosis which has also resolved. No signs of infection. No fever, chills, chest pain, cough, or SOB. No bleeding concerns. No nausea. Appetite okay. 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 This lasts for about the first week following treatment but then improved. Energy improved overall and trying to stay active.     His biggest complaint today is that of ongoing prolapsed stoma. He sleeps on his side but worry of lying on this has impacted his sleep. Apparently he was previously seen in ED for this during the summer. Notes that this has gotten much worse. Stoma is still pink and moist and he can gt it to go back in some with ice. He is still having output from stoma, at time diarrhea. Still using Miralax and Senna-S. Noticed more mucous in stool. They are concerned with the ongoing prolapse of his stoma.     Last, he noticed some floaters in the left eye since last cycle. No headaches, other vision changes or neuro symptoms. Was told he couldn't have eye exam during chemo.     Oncologic History:   March 2024 Mr. Mcbride started having diarrhea.  He was evaluated by his primary care and recommended to undergo a colonoscopy and upper endoscopy.     4/29/2024: EGD: 3 cm hiatal hernia.  Normal stomach.  Normal third portion of the duodenum.  Nonobstructing Schatzki's ring.     4/29/2024: Sigmoidoscopy: The digital rectal exam revealed a soft tissue mass palpated 8 cm from the anal verge.  The mass was noncircumferential and located predominantly at the posterior bowel wall.  An infiltrative  partially obstructing large mass was found in the rectum.  The mass was circumferential.  The mass measured 4 cm in length.  No bleeding was present.  This was biopsied.  A pediatric colonoscope was passed past the area but patient had a large quantity of stool present just above the stricture so colonoscopy was not able to be completed.     Biopsy of the rectal mass showed moderately to poorly differentiated adenocarcinoma.     5/2/2024: CT chest with contrast: No definite findings for metastatic disease in the chest. 2 small pulmonary nodules measuring up to 4 mm unchanged from prior study      5/2/2024: MRI pelvis with and without contrast:   A carcinoma involving the lower, middle and upper rectum extending over a length of roughly 12 cm demonstrated.  This lesion involves the mesorectum with no significant invasion anterolaterally on the right at the level of the mid rectum.  At this site infiltrative neoplasm is contactless with the posterior margin of the right seminal vesicle but it is not clearly invaded.  Neoplasm extends roughly 1.5 cm beyond the rectal margin.  Numerous enlarged mesorectal lymph nodes are demonstrated.  The larger mesorectal lymph node is demonstrated on the right measuring 1.8X 1.5 cm.  Additional mesorectal lymph nodes with short axis measurements at least 1 cm demonstrated.  Inferior mesenteric lymph nodes with short axis measurement of 1.1 cm X 1.0 cm are noted.  A left internal iliac node measuring 1.2X 0.8 cm is demonstrated.  A 2.7 X1.8 centimeter right iliac node is demonstrated.  The other visualized bowel is unremarkable.  The prostate is mildly enlarged.  No free fluid is demonstrated.     5/13/2024: CT abdomen and pelvis without contrast: Large infiltrative rectal mass involving the upper middle and lower rectum.  There is surrounding inflammatory change and pelvic free fluid.  There is adjacent mesorectal adenopathy, a left internal iliac node as well.  Large amount of stool  throughout the colon.  Enlarged prostate gland.  Left probable UPJ obstruction.     6/6/2024: Diverting colostomy     7/24/2024: Cycle 1 of FOLFOX     7/25/2024: PET scan: Masslike thickening with associated FDG avidity throughout the rectum is consistent with given history.  Metastatic disease within the liver, retroperitoneal nodes, perirectal nodes and inguinal nodes.  Findings are concerning for a UPJ obstruction.  Nuclear medicine renal Lasix scan for further characterize.    Current Chemo Regimen/TX: Modified FOLFOX  Current Cycle: 5  # of completed cycles: 4    Previous treatment: None    Past Medical History:   Diagnosis Date    Adolescent idiopathic scoliosis of thoracolumbar region 11/16/2017    Bunion 03/28/2013    Essential hypertension 03/05/2012    Formatting of this note might be different from the original.   Epic      Pure hypercholesterolemia 10/04/2012    Rectal cancer (H) 05/29/2024    Severe protein-calorie malnutrition (H24) 06/07/2024    Type 2 diabetes mellitus without complication (H) 06/21/2012       Past Surgical History:   Procedure Laterality Date    CATARACT EXTRACTION W/ INTRAOCULAR LENS IMPLANT Left 2021    ENDOSCOPY N/A     upper and lower    GI SURGERY      PHACOEMULSIFICATION WITH STANDARD INTRAOCULAR LENS IMPLANT Right 09/29/2021    Procedure: right Cataract Extraction with Implant;  Surgeon: Juan Prado MD;  Location: [facility] OR    robotic colostomy N/A 06/06/2024       Family History   Problem Relation Age of Onset    Pancreatic Cancer Father 55       Social History     Socioeconomic History    Marital status:      Spouse name: Gayle    Number of children: 0    Years of education: Not on file    Highest education level: Not on file   Occupational History    Occupation: worked at [employer]     Comment: on medical leave    Occupation:      Comment: retired   Tobacco Use    Smoking status: Former     Current packs/day: 0.00     Average packs/day: 0.5  packs/day for 15.0 years (7.5 ttl pk-yrs)     Types: Cigarettes     Start date:      Quit date:      Years since quittin.7    Smokeless tobacco: Former    Tobacco comments:     Chewed for about 15 years when he smoked.    Vaping Use    Vaping status: Never Used   Substance and Sexual Activity    Alcohol use: Not Currently    Drug use: Never    Sexual activity: Not on file   Other Topics Concern    Parent/sibling w/ CABG, MI or angioplasty before 65F 55M? Not Asked   Social History Narrative    Not on file     Social Determinants of Health     Financial Resource Strain: Not on file   Food Insecurity: No Food Insecurity (2024)    Received from [facility]    Hunger Vital Sign     Worried About Running Out of Food in the Last Year: Never true     Ran Out of Food in the Last Year: Never true   Transportation Needs: No Transportation Needs (2024)    Received from [facility]    PRAPARE - Transportation     Lack of Transportation (Medical): No     Lack of Transportation (Non-Medical): No   Physical Activity: Not on file   Stress: Not on file   Social Connections: Not on file   Interpersonal Safety: Low Risk  (9/3/2024)    Interpersonal Safety     Do you feel physically and emotionally safe where you currently live?: Yes     Within the past 12 months, have you been hit, slapped, kicked or otherwise physically hurt by someone?: No     Within the past 12 months, have you been humiliated or emotionally abused in other ways by your partner or ex-partner?: No   Housing Stability: High Risk (2024)    Received from [facility]    Housing Stability Vital Sign     Unable to Pay for Housing in the Last Year: No     Number of Times Moved in the Last Year: 2     Homeless in the Last Year: No       Current Outpatient Medications   Medication Sig Dispense Refill    aspirin 81 MG EC tablet Take 81 mg by mouth  daily      atorvastatin (LIPITOR) 20 MG tablet Take 20 mg by mouth daily      dexAMETHasone (DECADRON) 4 MG tablet Take 2 tablets (8 mg) by mouth daily Take for 2 days, starting the day after chemo. Take with food. 20 tablet 2    hydrochlorothiazide (HYDRODIURIL) 25 MG tablet Take 25 mg by mouth daily      lidocaine-prilocaine (EMLA) 2.5-2.5 % external cream Apply topically daily as needed for moderate pain 30 g 1    lisinopril (ZESTRIL) 40 MG tablet Take 40 mg by mouth daily      metFORMIN (GLUCOPHAGE-XR) 750 MG 24 hr tablet Take 750 mg by mouth 2 times daily (with meals)      multivitamin w/minerals (MULTI-VITAMIN) tablet Take 1 tablet by mouth daily      Polyethylene Glycol 3350 (MIRALAX PO) Take 1 Capful by mouth daily      acetaminophen (TYLENOL) 500 MG tablet Take 650 mg by mouth every 6 hours as needed for mild pain (Patient not taking: Reported on 8/20/2024)      loperamide (IMODIUM A-D) 2 MG tablet Take 1 tablet (2 mg) by mouth 4 times daily as needed for diarrhea (Patient not taking: Reported on 9/17/2024) 90 tablet 0    ondansetron (ZOFRAN) 8 MG tablet Take 1 tablet (8 mg) by mouth every 8 hours as needed for nausea (vomiting) (Patient not taking: Reported on 9/17/2024) 30 tablet 2    Oxymetazoline HCl (NASAL DECONGESTANT SPRAY NA) Spray in nostril as needed (Patient not taking: Reported on 9/17/2024)      prochlorperazine (COMPAZINE) 5 MG tablet Take 1 tablet (5 mg) by mouth every 6 hours as needed for nausea or vomiting (Patient not taking: Reported on 9/17/2024) 90 tablet 0    traMADol (ULTRAM) 50 MG tablet Take 1 tablet (50 mg) by mouth every 6 hours as needed for severe pain (Patient not taking: Reported on 9/17/2024) 40 tablet 0    traZODone (DESYREL) 100 MG tablet Take 100 mg by mouth at bedtime (Patient not taking: Reported on 8/6/2024)       No current facility-administered medications for this visit.     Facility-Administered Medications Ordered in Other Visits   Medication Dose Route Frequency  Provider Last Rate Last Admin    fluorouracil (ADRUCIL) 4,510 mg in sodium chloride 0.9 % 230 mL 46 hr HOME Infusion (via [facility])  2,400 mg/m2 (Treatment Plan Recorded) Intravenous Once Saida Bragg APRN CNP        fluorouracil (ADRUCIL) injection 750 mg  400 mg/m2 (Treatment Plan Recorded) Intravenous Once Saida Bragg APRN CNP        sodium chloride (PF) 0.9% PF flush 3-20 mL  3-20 mL Intracatheter q1 min prn Saida Bragg APRN CNP   10 mL at 09/17/24 0940        No Known Allergies    Review Of Systems:  A complete review of systems is negative except for the above mentioned items in the interval history.     ECOG Performance Status: 0    Physical Exam:  BP (!) 140/68   Pulse 58   Temp 97.5  F (36.4  C) (Tympanic)   Resp 16   Wt 78.4 kg (172 lb 13.5 oz)   SpO2 98%   BMI 25.52 kg/m    GENERAL APPEARANCE: Healthy, alert and in no acute distress.  HEENT: Eyes appear normal without scleral icterus. Extraocular movements intact.   NECK:   Supple with normal range of motion. No asymmetry.  LYMPHATICS: No palpable cervical, supraclavicular nodes.  RESP: Lungs clear to auscultation bilaterally, respirations regular and easy.  CARDIOVASCULAR: Regular rate and rhythm. Normal S1, S2; no murmur, gallop, or rub.  ABDOMEN: Soft, non-tender, non-distended. Colostomy on left abdomen with apparent prolapsed bowel. Prolapse consists of two protruding segments measuring 12 cm in length each. Stoma is pink and moist. These segments fill much of his colostomy bag.   MUSCULOSKELETAL: Extremities without gross deformities noted. No edema of bilateral lower extremities.  SKIN: No suspicious lesions or rashes.  NEURO: Alert and oriented x 3.  Gait steady.  PSYCHIATRIC: Mentation and affect appear normal.  Mood appropriate.    Laboratory:  Results for orders placed or performed in visit on 09/17/24   Extra Tube     Status: None    Narrative    The following orders were created for panel order Extra  Tube.  Procedure                               Abnormality         Status                     ---------                               -----------         ------                     Extra Red Top Tube[[id number]]                                                          Extra Purple Top Tube[[id number]]                            Final result                 Please view results for these tests on the individual orders.   Extra Purple Top Tube     Status: None   Result Value Ref Range    Hold Specimen JI    Results for orders placed or performed in visit on 09/17/24   Comprehensive metabolic panel     Status: Abnormal   Result Value Ref Range    Sodium 140 135 - 145 mmol/L    Potassium 4.1 3.4 - 5.3 mmol/L    Carbon Dioxide (CO2) 23 22 - 29 mmol/L    Anion Gap 13 7 - 15 mmol/L    Urea Nitrogen 18.8 8.0 - 23.0 mg/dL    Creatinine 1.01 0.67 - 1.17 mg/dL    GFR Estimate 81 >60 mL/min/1.73m2    Calcium 9.7 8.8 - 10.4 mg/dL    Chloride 104 98 - 107 mmol/L    Glucose 152 (H) 70 - 99 mg/dL    Alkaline Phosphatase 85 40 - 150 U/L    AST 21 0 - 45 U/L    ALT 23 0 - 70 U/L    Protein Total 6.6 6.4 - 8.3 g/dL    Albumin 4.1 3.5 - 5.2 g/dL    Bilirubin Total 0.4 <=1.2 mg/dL   CBC with platelets and differential     Status: Abnormal   Result Value Ref Range    WBC Count 10.4 4.0 - 11.0 10e3/uL    RBC Count 3.64 (L) 4.40 - 5.90 10e6/uL    Hemoglobin 11.6 (L) 13.3 - 17.7 g/dL    Hematocrit 33.6 (L) 40.0 - 53.0 %    MCV 92 78 - 100 fL    MCH 31.9 26.5 - 33.0 pg    MCHC 34.5 31.5 - 36.5 g/dL    RDW 16.0 (H) 10.0 - 15.0 %    Platelet Count 163 150 - 450 10e3/uL    % Neutrophils 64 %    % Lymphocytes 23 %    % Monocytes 10 %    % Eosinophils 2 %    % Basophils 1 %    % Immature Granulocytes 1 %    NRBCs per 100 WBC 0 <1 /100    Absolute Neutrophils 6.6 1.6 - 8.3 10e3/uL    Absolute Lymphocytes 2.4 0.8 - 5.3 10e3/uL    Absolute Monocytes 1.0 0.0 - 1.3 10e3/uL    Absolute Eosinophils 0.2 0.0 - 0.7 10e3/uL    Absolute Basophils 0.1 0.0 - 0.2  10e3/uL    Absolute Immature Granulocytes 0.1 <=0.4 10e3/uL    Absolute NRBCs 0.0 10e3/uL   CBC with platelets differential     Status: Abnormal    Narrative    The following orders were created for panel order CBC with platelets differential.  Procedure                               Abnormality         Status                     ---------                               -----------         ------                     CBC with platelets and d...[[id number]]  Abnormal            Final result                 Please view results for these tests on the individual orders.       Imaging Studies:    None this imaging    ASSESSMENT/PLAN:    1.  Stage IV rectal cancer:   Initially presented with diarrhea in March 2024.  Sigmoidoscopy showed rectal mass infiltrative partially obstructing large mass 8 cm from the anal verge.  MRI pelvis showed carcinoma involving lower, middle and upper rectum extending over a length of roughly 12 cm.  Lesion involves the mesorectum but most significant invasion anterolaterally on the right at the level of the mid rectum.  Neoplasm is contiguous with the posterior margin of the right seminal vesicle but is not clearly invading it.  Extends roughly 1.5 cm beyond the rectal margin.  It also showed numerous enlarged mesorectal lymph nodes.  A inferior mesenteric lymph node was also noted.  There were also enlarged left iliac nodes and right iliac node. He underwent a diverting ostomy by  on 6/6/2024.    He commenced FOLFOX on 7/24/2024. Immediately following commencement, he had a PET scan completed at [facility] showing 5 foci of abnormal avidity present within the liver, involvement of the common iliac lymph node, upper abdominal para-aortic lymph node, right common iliac node and left perirectal node.  A few inguinal nodes are also avid. Dr. Poon spoke with Dr. Redmond who felt we would proceed with chemo alone prior to considering resection.     Today, he returns for C5 therapy. He is  overall tolerating therapy with modest side effects and feels that overall symptoms related to his cancer have greatly improved. We will therefore continue with treatment. I will see him back in 2 weeks scheduled. Will plan on restaging in about 4 weeks following C6. I am happy to see him back sooner with concerns.     2. Prolapsed bowel   This is the first time I am seeing him. Having trouble uploading picture to chart but upon reviewing photo from past ED visits, this has worsened significantly. It remains pink and moist and in apparent good health, that get, he two segments associated with this stoma are 12 cm in length. Will place referral back to Dr. Redmond for discussions regarding this concern.     3. Chemo induced neuropathy   With cold exposure only. Gone by second week. Would encourage ongoing avoidance and wearing gloves when needed. Will continue to monitor closely.     4. Visual disturbance  Black floaters in left eye for last few weeks. Remainder of neuro exam negative. Encouraged him to follow-up for eye exam, no contraindications to getting an eye exam.       Patient in agreement with plan and verbalizes understanding. Agrees to call with any questions or concerns.    59 minutes spent in the patient's encounter today with time spent in review of patient's chart along with chart preparation and review of the treatment plan and signing of treatment plan.  Time was also spent with the patient in obtaining a review of systems and performing a physical exam along with detailed review of all test results. Time was also spent in discussing plan for future follow-up and relating instructions for follow-up and in placing future orders.    DANIEL Lopez [facility]  Medical Oncology

## 2024-09-17 NOTE — NURSING NOTE
"Oncology Rooming Note    September 17, 2024 8:10 AM   Tal Mcbride is a 69 year old male who presents for:    Chief Complaint   Patient presents with    Oncology Clinic Visit     Follow up rectal cancer     Initial Vitals: BP (!) 140/68   Pulse 58   Temp 97.5  F (36.4  C) (Tympanic)   Resp 16   Wt 78.4 kg (172 lb 13.5 oz)   SpO2 98%   BMI 25.52 kg/m   Estimated body mass index is 25.52 kg/m  as calculated from the following:    Height as of 8/6/24: 1.753 m (5' 9\").    Weight as of this encounter: 78.4 kg (172 lb 13.5 oz). Body surface area is 1.95 meters squared.  No Pain (0) Comment: Data Unavailable   No LMP for male patient.  Allergies reviewed: Yes  Medications reviewed: Yes    Medications: Medication refills not needed today.  Pharmacy name entered into [facility]: [facility] [address]    Frailty Screening:   Is the patient here for a new oncology consult visit in cancer care? 2. No      Clinical concerns: none       Angie Amaya LPN             "

## 2024-09-17 NOTE — PROGRESS NOTES
Care Coordination Focused Note:    Met w/pt and spouse in person during infusion appt.    They shared they were denied [facility] ([facility]) cash assistance, SNAP benefits, and medical assistance d/t county not receiving all the required documents (they noted these were all sent in). Writer had pt and spouse sign a LESLIE for writer to discuss pt's case. Will follow-up with [facility].    Notes they received a gift from [employer] - helping pay towards an outstanding bill at [facility]. They note they still have an outstanding bill there, at [place], and at [facility]. Writer will follow-up with [facility]'s [employer] program and [place]'s [employer] program.    Notes they received gifts from the [employer]- gas and grocery gift cards.    Shared writer spoke with a rep. at [facility] and they noted they do not have vacancies at this time. Encouraged pt to try again in approximately 3 months. Rep. emailed an application to writer and encouraged pt and spouse to fill it out. Writer will provide this to pt at next in-person visit.    Pt notes his medical insurance will no longer be accepted at certain medical facilities starting in 2025. Pt and spouse are planning to meet w/a Medicare representative through pt's spouse's work on 10/21/24.    Plan to meet with pt and spouse at next in-person visit.

## 2024-09-17 NOTE — PROGRESS NOTES
Infusion Nursing Note:  Tal ROSALIE Mcbride presents today for FOLFOX.    Patient seen by provider today: Yes: Saida Bragg NP ONC  Home medications, allergies, vitals, fall risk, pain and abuse screen [facility] this date. All reviewed by this nurse prior to treating. Patient denies questions or concerns not already discussed with provider prior, wishes to proceed with treatment.    present during visit today: Not Applicable.    Note: Patient asked to ensure he can have his pump off in [employer] (will reach out to [employer] once sure of pump off time) and that a call be placed to TELLO Ngo asking she come see patient. Call placed.       Treatment Conditions:  Lab Results   Component Value Date    HGB 11.6 (L) 09/17/2024    WBC 10.4 09/17/2024    ANEUTAUTO 6.6 09/17/2024     09/17/2024        Lab Results   Component Value Date     09/17/2024    POTASSIUM 4.1 09/17/2024    CR 1.01 09/17/2024    RAUDEL 9.7 09/17/2024    BILITOTAL 0.4 09/17/2024    ALBUMIN 4.1 09/17/2024    ALT 23 09/17/2024    AST 21 09/17/2024       Results reviewed, labs MET treatment parameters, ok to proceed with treatment.    Message out to [employer] asking she help coordinate pump off in [employer], and updating patient will be ready by about 11am Thursday 9-19-24.    Patient discharged by Citlalli MULLINS.

## 2024-09-17 NOTE — PATIENT INSTRUCTIONS
We will see you back as planned. If you have any questions or concerns, we can be reached Monday through Friday 8am - 430pm at [phone] ([phone]). If you have concerns related to a potential reaction/side effect after hours/weekends/holiday's, please seek emergent medical care.

## 2024-09-18 ENCOUNTER — TELEPHONE (OUTPATIENT)
Dept: INFUSION THERAPY | Facility: OTHER | Age: 69
End: 2024-09-18

## 2024-09-18 ENCOUNTER — INFUSION THERAPY VISIT (OUTPATIENT)
Dept: INFUSION THERAPY | Facility: OTHER | Age: 69
End: 2024-09-18
Attending: INTERNAL MEDICINE
Payer: COMMERCIAL

## 2024-09-18 DIAGNOSIS — C20 RECTAL CANCER (H): Primary | ICD-10-CM

## 2024-09-18 NOTE — PROGRESS NOTES
Patient arrived to infusion center, noting he was told to report here by [facility] as he needed a code to fix what was wrong with pump. Code unknown to infusion, call placed to I and alerted to code. Line primed to remove scant amount of air. Pump restarted. Patient instructed on when to call be seen. Verbalizes understanding.

## 2024-09-18 NOTE — PROGRESS NOTES
"Call from patient noting his pump is beeping and saying \"prime tubing.\" Asked if he has called I and he has not. Directed to how to find their number, and advised they are better equipped to walk him through trouble shooting at home, and if further hands on assist is necessary, he can call us and we will get him on the schedule to evaluate. Patient verbalized understanding.   "

## 2024-09-20 ENCOUNTER — DOCUMENTATION ONLY (OUTPATIENT)
Dept: CARE COORDINATION | Facility: OTHER | Age: 69
End: 2024-09-20

## 2024-09-20 NOTE — CONFIDENTIAL NOTE
"Called [facility] ([facility] and inquired what paperwork/next steps they need for pt to qualify for SNAP/food benefits and cash assistance.    Was advised the following:    Cash: needs to reapply- stated they did not receive job or income status from [employer]. Noted they can receive a note from a provider stating pt can't work for 45+ days in replacement to job/income status.    They also need one month of savings/checking information for pt through his credit union.    SNAP: can be reinstated by Oct. 5 if they receive income/job status.  Noted that if it's been 60 days or less since patient has been employed there, they need job/income status.  Stated that if it's been 90+ days, they can take verification via \"word of mouth.\"      Pt has the option to send in unpaid medical bills to qualify for extra assistance.     Will update pt with this information.      "

## 2024-09-26 NOTE — PROGRESS NOTES
AUTH REQUIRED DAY ORDERS (or CHANGE IN ORDERS) ARE RECEIVED. PLEASE NOTIFY PA TEAM ONCE ORDERS RECEIVED.     07/22/2024 (5FU) MUST BE ON CADD FOR INSURANCE COVERAGE. IF NOT, RETURN TO INTAKE FOR SELF PAY QUOTE.   OTHER THERAPY: RETURN TO INTAKE FOR COVERAGE DETERMINATION.     PT HAS COVERAGE FOR LINE CARE, TPA, HYDRATION AND HOME DISCONNECTS.  CAN BE IVN, HB VS NON HB DOES NOT MATTER WITH THIS PLAN.  TL    07/23/2024- If orders for neulasta are received, please alert PA team to obtain auth. KG

## 2024-09-30 NOTE — PROGRESS NOTES
"Oncology Follow-up Visit    Reason for Visit:  Ezra is a 69 year old man with a diagnosis of Stage IV rectal cancer, who presents to the clinic today for routine follow-up.    Nursing Note and documentation reviewed: Yes    Interval History:   Ezra notes that he \"is feeling 100% compared to prior to treatment starting.\" His only complaint is that of ongoing issues with his stoma site. This causes his psychosocial distress and has really affected his sleep. Otherwise he is doing well. No signs of infection. He did knick his port site this morning with razor (shaves to remove hair prior to dressing being applied). No evidence of infection and superficial scrape. No bleeding concerns. No nausea or vomiting and appetite has been very good. No swelling or SOB and unsurprised by his nearly 10 lb weight gain in the last month. No bowel concerns. Continues to endorse mucous discharge from rectum. Stool only passing from colostomy. Energy levels great by second week and progressively improve the first week. Neuropathy with cold exposure, nothing chronic or lasting.     Oncologic History:   March 2024 Mr. Mcbride started having diarrhea.  He was evaluated by his primary care and recommended to undergo a colonoscopy and upper endoscopy.     4/29/2024: EGD: 3 cm hiatal hernia.  Normal stomach.  Normal third portion of the duodenum.  Nonobstructing Schatzki's ring.     4/29/2024: Sigmoidoscopy: The digital rectal exam revealed a soft tissue mass palpated 8 cm from the anal verge.  The mass was noncircumferential and located predominantly at the posterior bowel wall.  An infiltrative partially obstructing large mass was found in the rectum.  The mass was circumferential.  The mass measured 4 cm in length.  No bleeding was present.  This was biopsied.  A pediatric colonoscope was passed past the area but patient had a large quantity of stool present just above the stricture so colonoscopy was not able to be completed.     Biopsy of the " rectal mass showed moderately to poorly differentiated adenocarcinoma.     5/2/2024: CT chest with contrast: No definite findings for metastatic disease in the chest. 2 small pulmonary nodules measuring up to 4 mm unchanged from prior study      5/2/2024: MRI pelvis with and without contrast:   A carcinoma involving the lower, middle and upper rectum extending over a length of roughly 12 cm demonstrated.  This lesion involves the mesorectum with no significant invasion anterolaterally on the right at the level of the mid rectum.  At this site infiltrative neoplasm is contactless with the posterior margin of the right seminal vesicle but it is not clearly invaded.  Neoplasm extends roughly 1.5 cm beyond the rectal margin.  Numerous enlarged mesorectal lymph nodes are demonstrated.  The larger mesorectal lymph node is demonstrated on the right measuring 1.8X 1.5 cm.  Additional mesorectal lymph nodes with short axis measurements at least 1 cm demonstrated.  Inferior mesenteric lymph nodes with short axis measurement of 1.1 cm X 1.0 cm are noted.  A left internal iliac node measuring 1.2X 0.8 cm is demonstrated.  A 2.7 X1.8 centimeter right iliac node is demonstrated.  The other visualized bowel is unremarkable.  The prostate is mildly enlarged.  No free fluid is demonstrated.     5/13/2024: CT abdomen and pelvis without contrast: Large infiltrative rectal mass involving the upper middle and lower rectum.  There is surrounding inflammatory change and pelvic free fluid.  There is adjacent mesorectal adenopathy, a left internal iliac node as well.  Large amount of stool throughout the colon.  Enlarged prostate gland.  Left probable UPJ obstruction.     6/6/2024: Diverting colostomy     7/24/2024: Cycle 1 of FOLFOX (later reviewed Foundation One testing and discussed addition of Avastin but patient wanted no change at this time)     7/25/2024: PET scan: Masslike thickening with associated FDG avidity throughout the rectum  is consistent with given history.  Metastatic disease within the liver, retroperitoneal nodes, perirectal nodes and inguinal nodes.  Findings are concerning for a UPJ obstruction.  Nuclear medicine renal Lasix scan for further characterize.    Current Chemo Regimen/TX: Modified FOLFOX  Current Cycle: 6  # of completed cycles: 5    Previous treatment: None    Past Medical History:   Diagnosis Date    Adolescent idiopathic scoliosis of thoracolumbar region 2017    Bunion 2013    Essential hypertension 2012    Formatting of this note might be different from the original.   Epic      Pure hypercholesterolemia 10/04/2012    Rectal cancer (H) 2024    Severe protein-calorie malnutrition (H) 2024    Type 2 diabetes mellitus without complication (H) 2012       Past Surgical History:   Procedure Laterality Date    CATARACT EXTRACTION W/ INTRAOCULAR LENS IMPLANT Left     ENDOSCOPY N/A     upper and lower    GI SURGERY      PHACOEMULSIFICATION WITH STANDARD INTRAOCULAR LENS IMPLANT Right 2021    Procedure: right Cataract Extraction with Implant;  Surgeon: Juan Prado MD;  Location: [facility] OR    robotic colostomy N/A 2024       Family History   Problem Relation Age of Onset    Pancreatic Cancer Father 55       Social History     Socioeconomic History    Marital status:      Spouse name: Gayle    Number of children: 0    Years of education: Not on file    Highest education level: Not on file   Occupational History    Occupation: worked at [employer]     Comment: on medical leave    Occupation:      Comment: retired   Tobacco Use    Smoking status: Former     Current packs/day: 0.00     Average packs/day: 0.5 packs/day for 15.0 years (7.5 ttl pk-yrs)     Types: Cigarettes     Start date:      Quit date:      Years since quittin.7    Smokeless tobacco: Former    Tobacco comments:     Chewed for about 15 years when he smoked.    Vaping  Use    Vaping status: Never Used   Substance and Sexual Activity    Alcohol use: Not Currently    Drug use: Never    Sexual activity: Not on file   Other Topics Concern    Parent/sibling w/ CABG, MI or angioplasty before 65F 55M? Not Asked   Social History Narrative    Not on file     Social Determinants of Health     Financial Resource Strain: Not on file   Food Insecurity: No Food Insecurity (6/6/2024)    Received from [facility]    Hunger Vital Sign     Worried About Running Out of Food in the Last Year: Never true     Ran Out of Food in the Last Year: Never true   Transportation Needs: No Transportation Needs (6/6/2024)    Received from [facility]    PRAPARE - Transportation     Lack of Transportation (Medical): No     Lack of Transportation (Non-Medical): No   Physical Activity: Not on file   Stress: Not on file   Social Connections: Not on file   Interpersonal Safety: Low Risk  (10/1/2024)    Interpersonal Safety     Do you feel physically and emotionally safe where you currently live?: Yes     Within the past 12 months, have you been hit, slapped, kicked or otherwise physically hurt by someone?: No     Within the past 12 months, have you been humiliated or emotionally abused in other ways by your partner or ex-partner?: No   Housing Stability: High Risk (6/6/2024)    Received from [facility]    Housing Stability Vital Sign     Unable to Pay for Housing in the Last Year: No     Number of Times Moved in the Last Year: 2     Homeless in the Last Year: No       Current Outpatient Medications   Medication Sig Dispense Refill    aspirin 81 MG EC tablet Take 81 mg by mouth daily      atorvastatin (LIPITOR) 20 MG tablet Take 20 mg by mouth daily      dexAMETHasone (DECADRON) 4 MG tablet Take 2 tablets (8 mg) by mouth daily Take for 2 days, starting the day after chemo. Take with food. 20 tablet 2     hydrochlorothiazide (HYDRODIURIL) 25 MG tablet Take 25 mg by mouth daily      lidocaine-prilocaine (EMLA) 2.5-2.5 % external cream Apply topically daily as needed for moderate pain 30 g 1    lisinopril (ZESTRIL) 40 MG tablet Take 40 mg by mouth daily      metFORMIN (GLUCOPHAGE-XR) 750 MG 24 hr tablet Take 750 mg by mouth 2 times daily (with meals)      multivitamin w/minerals (MULTI-VITAMIN) tablet Take 1 tablet by mouth daily      Polyethylene Glycol 3350 (MIRALAX PO) Take 1 Capful by mouth daily      acetaminophen (TYLENOL) 500 MG tablet Take 650 mg by mouth every 6 hours as needed for mild pain (Patient not taking: Reported on 8/20/2024)      loperamide (IMODIUM A-D) 2 MG tablet Take 1 tablet (2 mg) by mouth 4 times daily as needed for diarrhea (Patient not taking: Reported on 9/17/2024) 90 tablet 0    ondansetron (ZOFRAN) 8 MG tablet Take 1 tablet (8 mg) by mouth every 8 hours as needed for nausea (vomiting) (Patient not taking: Reported on 9/17/2024) 30 tablet 2    Oxymetazoline HCl (NASAL DECONGESTANT SPRAY NA) Spray in nostril as needed (Patient not taking: Reported on 9/17/2024)      prochlorperazine (COMPAZINE) 5 MG tablet Take 1 tablet (5 mg) by mouth every 6 hours as needed for nausea or vomiting (Patient not taking: Reported on 9/17/2024) 90 tablet 0    traMADol (ULTRAM) 50 MG tablet Take 1 tablet (50 mg) by mouth every 6 hours as needed for severe pain (Patient not taking: Reported on 9/17/2024) 40 tablet 0    traZODone (DESYREL) 100 MG tablet Take 100 mg by mouth at bedtime (Patient not taking: Reported on 8/6/2024)       No current facility-administered medications for this visit.     Facility-Administered Medications Ordered in Other Visits   Medication Dose Route Frequency Provider Last Rate Last Admin    sodium chloride (PF) 0.9% PF flush 3-20 mL  3-20 mL Intracatheter q1 min prn Shawna Poon MD   20 mL at 10/01/24 0835        No Known Allergies    Review Of Systems:  A complete review of systems  "is negative except for the above mentioned items in the interval history.     ECOG Performance Status: 0    Physical Exam:  /74 (BP Location: Left arm, Patient Position: Sitting, Cuff Size: Adult Regular)   Pulse 59   Temp 98  F (36.7  C) (Tympanic)   Ht 1.753 m (5' 9\")   Wt 80.1 kg (176 lb 9.4 oz)   SpO2 98%   BMI 26.08 kg/m    GENERAL APPEARANCE: Healthy, alert and in no acute distress.  HEENT: Eyes appear normal without scleral icterus. Extraocular movements intact.   NECK:   Supple with normal range of motion. No asymmetry.  LYMPHATICS: No palpable cervical, supraclavicular nodes.  RESP: Lungs clear to auscultation bilaterally, respirations regular and easy.  CARDIOVASCULAR: Regular rate and rhythm. Normal S1, S2; no murmur, gallop, or rub.  ABDOMEN: Soft, non-tender, non-distended. Colostomy on left abdomen with apparent prolapsed bowel. Prolapse consists of two protruding segments; pink and moist. These segments fill much of his colostomy bag.   MUSCULOSKELETAL: Extremities without gross deformities noted. No edema of bilateral lower extremities.  SKIN: No suspicious lesions or rashes.  NEURO: Alert and oriented x 3.  Gait steady.  PSYCHIATRIC: Mentation and affect appear normal.  Mood appropriate.    Laboratory:  Results for orders placed or performed in visit on 10/01/24   Comprehensive metabolic panel     Status: Abnormal   Result Value Ref Range    Sodium 137 135 - 145 mmol/L    Potassium 4.2 3.4 - 5.3 mmol/L    Carbon Dioxide (CO2) 24 22 - 29 mmol/L    Anion Gap 14 7 - 15 mmol/L    Urea Nitrogen 24.2 (H) 8.0 - 23.0 mg/dL    Creatinine 1.01 0.67 - 1.17 mg/dL    GFR Estimate 81 >60 mL/min/1.73m2    Calcium 9.7 8.8 - 10.4 mg/dL    Chloride 99 98 - 107 mmol/L    Glucose 190 (H) 70 - 99 mg/dL    Alkaline Phosphatase 106 40 - 150 U/L    AST 25 0 - 45 U/L    ALT 25 0 - 70 U/L    Protein Total 6.8 6.4 - 8.3 g/dL    Albumin 4.3 3.5 - 5.2 g/dL    Bilirubin Total 0.5 <=1.2 mg/dL   CBC with platelets and " differential     Status: Abnormal   Result Value Ref Range    WBC Count 9.7 4.0 - 11.0 10e3/uL    RBC Count 3.83 (L) 4.40 - 5.90 10e6/uL    Hemoglobin 12.3 (L) 13.3 - 17.7 g/dL    Hematocrit 35.7 (L) 40.0 - 53.0 %    MCV 93 78 - 100 fL    MCH 32.1 26.5 - 33.0 pg    MCHC 34.5 31.5 - 36.5 g/dL    RDW 15.9 (H) 10.0 - 15.0 %    Platelet Count 165 150 - 450 10e3/uL    % Neutrophils 59 %    % Lymphocytes 27 %    % Monocytes 11 %    % Eosinophils 2 %    % Basophils 1 %    % Immature Granulocytes 1 %    NRBCs per 100 WBC 0 <1 /100    Absolute Neutrophils 5.8 1.6 - 8.3 10e3/uL    Absolute Lymphocytes 2.6 0.8 - 5.3 10e3/uL    Absolute Monocytes 1.1 0.0 - 1.3 10e3/uL    Absolute Eosinophils 0.2 0.0 - 0.7 10e3/uL    Absolute Basophils 0.1 0.0 - 0.2 10e3/uL    Absolute Immature Granulocytes 0.1 <=0.4 10e3/uL    Absolute NRBCs 0.0 10e3/uL   CBC with platelets differential     Status: Abnormal    Narrative    The following orders were created for panel order CBC with platelets differential.  Procedure                               Abnormality         Status                     ---------                               -----------         ------                     CBC with platelets and d...[[id number]]  Abnormal            Final result                 Please view results for these tests on the individual orders.         Imaging Studies:    None this imaging    ASSESSMENT/PLAN:    1.  Stage IV rectal cancer:   Initially presented with diarrhea in March 2024.  Sigmoidoscopy showed rectal mass infiltrative partially obstructing large mass 8 cm from the anal verge.  MRI pelvis showed carcinoma involving lower, middle and upper rectum extending over a length of roughly 12 cm.  Lesion involves the mesorectum but most significant invasion anterolaterally on the right at the level of the mid rectum.  Neoplasm is contiguous with the posterior margin of the right seminal vesicle but is not clearly invading it.  Extends roughly 1.5 cm beyond  the rectal margin.  It also showed numerous enlarged mesorectal lymph nodes.  A inferior mesenteric lymph node was also noted.  There were also enlarged left iliac nodes and right iliac node. He underwent a diverting ostomy by  on 6/6/2024.    He commenced FOLFOX on 7/24/2024. Immediately following commencement, he had a PET scan completed at [facility] showing 5 foci of abnormal avidity present within the liver, involvement of the common iliac lymph node, upper abdominal para-aortic lymph node, right common iliac node and left perirectal node.  A few inguinal nodes are also avid. Dr. Poon spoke with Dr. Redmond who felt we would proceed with chemo alone prior to considering resection. Of note, his Foundation One results returned prior to cycle 3. They do not show any mutations in KRAS, NRAS or BRAF.  The sample did have low tumor purity. He discussed with Dr. Poon with addition of Avastin but declined at this time over fear of side effects and perception that he was doing well on FOLFOX alone.      Today, he returns for C6 therapy. He is overall tolerating therapy with modest side effects and feels that overall symptoms related to his cancer have greatly improved. We will therefore continue with treatment without change. He is scheduled to see Dr. Redmond later this week regarding #2. He is scheduled for staging scans next week, following C6 therapy. He will return in 2 weeks prior to C7 to review scans and consider ongoing therapy.     Of note, port has small superficial scratch from where he shaved chest hair. Strongly discouraged from shaving port site related to bleeding and infection prevention.     2. Prolapsed bowel   Seeing Dr. Vincent on Friday. Stoma appears pink and healthy but causing significant amounts of distress for patient.     3. Chemo induced neuropathy   With cold exposure only. Gone by second week. Would encourage ongoing avoidance and wearing gloves when needed. Will continue to  monitor closely. No change.     4. Visual disturbance  Black floaters in left eye for last few weeks. Remainder of neuro exam negative. Encouraged him to follow-up for eye exam, no contraindications to getting an eye exam. No change.       Patient in agreement with plan and verbalizes understanding. Agrees to call with any questions or concerns.    45 minutes spent in the patient's encounter today with time spent in review of patient's chart along with chart preparation and review of the treatment plan and signing of treatment plan.  Time was also spent with the patient in obtaining a review of systems and performing a physical exam along with detailed review of all test results. Time was also spent in discussing plan for future follow-up and relating instructions for follow-up and in placing future orders.    [facility]  Medical Oncology

## 2024-10-01 ENCOUNTER — INFUSION THERAPY VISIT (OUTPATIENT)
Dept: INFUSION THERAPY | Facility: OTHER | Age: 69
End: 2024-10-01
Attending: INTERNAL MEDICINE
Payer: COMMERCIAL

## 2024-10-01 ENCOUNTER — ONCOLOGY VISIT (OUTPATIENT)
Dept: ONCOLOGY | Facility: OTHER | Age: 69
End: 2024-10-01
Attending: INTERNAL MEDICINE
Payer: COMMERCIAL

## 2024-10-01 ENCOUNTER — CARE COORDINATION (OUTPATIENT)
Dept: CARE COORDINATION | Facility: OTHER | Age: 69
End: 2024-10-01

## 2024-10-01 VITALS
DIASTOLIC BLOOD PRESSURE: 74 MMHG | WEIGHT: 176.59 LBS | TEMPERATURE: 98 F | HEIGHT: 69 IN | BODY MASS INDEX: 26.16 KG/M2 | OXYGEN SATURATION: 98 % | SYSTOLIC BLOOD PRESSURE: 126 MMHG | HEART RATE: 59 BPM

## 2024-10-01 VITALS — HEART RATE: 58 BPM | SYSTOLIC BLOOD PRESSURE: 129 MMHG | DIASTOLIC BLOOD PRESSURE: 65 MMHG

## 2024-10-01 DIAGNOSIS — T45.1X5A CHEMOTHERAPY-INDUCED NEUROPATHY (H): ICD-10-CM

## 2024-10-01 DIAGNOSIS — C20 RECTAL CANCER (H): Primary | ICD-10-CM

## 2024-10-01 DIAGNOSIS — H53.9 VISUAL DISTURBANCE: ICD-10-CM

## 2024-10-01 DIAGNOSIS — Z91.89 AT RISK FOR COMPLICATION OF STOMA: ICD-10-CM

## 2024-10-01 DIAGNOSIS — G62.0 CHEMOTHERAPY-INDUCED NEUROPATHY (H): ICD-10-CM

## 2024-10-01 LAB
ALBUMIN SERPL BCG-MCNC: 4.3 G/DL (ref 3.5–5.2)
ALP SERPL-CCNC: 106 U/L (ref 40–150)
ALT SERPL W P-5'-P-CCNC: 25 U/L (ref 0–70)
ANION GAP SERPL CALCULATED.3IONS-SCNC: 14 MMOL/L (ref 7–15)
AST SERPL W P-5'-P-CCNC: 25 U/L (ref 0–45)
BASOPHILS # BLD AUTO: 0.1 10E3/UL (ref 0–0.2)
BASOPHILS NFR BLD AUTO: 1 %
BILIRUB SERPL-MCNC: 0.5 MG/DL
BUN SERPL-MCNC: 24.2 MG/DL (ref 8–23)
CALCIUM SERPL-MCNC: 9.7 MG/DL (ref 8.8–10.4)
CHLORIDE SERPL-SCNC: 99 MMOL/L (ref 98–107)
CREAT SERPL-MCNC: 1.01 MG/DL (ref 0.67–1.17)
EGFRCR SERPLBLD CKD-EPI 2021: 81 ML/MIN/1.73M2
EOSINOPHIL # BLD AUTO: 0.2 10E3/UL (ref 0–0.7)
EOSINOPHIL NFR BLD AUTO: 2 %
ERYTHROCYTE [DISTWIDTH] IN BLOOD BY AUTOMATED COUNT: 15.9 % (ref 10–15)
GLUCOSE SERPL-MCNC: 190 MG/DL (ref 70–99)
HCO3 SERPL-SCNC: 24 MMOL/L (ref 22–29)
HCT VFR BLD AUTO: 35.7 % (ref 40–53)
HGB BLD-MCNC: 12.3 G/DL (ref 13.3–17.7)
IMM GRANULOCYTES # BLD: 0.1 10E3/UL
IMM GRANULOCYTES NFR BLD: 1 %
LYMPHOCYTES # BLD AUTO: 2.6 10E3/UL (ref 0.8–5.3)
LYMPHOCYTES NFR BLD AUTO: 27 %
MCH RBC QN AUTO: 32.1 PG (ref 26.5–33)
MCHC RBC AUTO-ENTMCNC: 34.5 G/DL (ref 31.5–36.5)
MCV RBC AUTO: 93 FL (ref 78–100)
MONOCYTES # BLD AUTO: 1.1 10E3/UL (ref 0–1.3)
MONOCYTES NFR BLD AUTO: 11 %
NEUTROPHILS # BLD AUTO: 5.8 10E3/UL (ref 1.6–8.3)
NEUTROPHILS NFR BLD AUTO: 59 %
NRBC # BLD AUTO: 0 10E3/UL
NRBC BLD AUTO-RTO: 0 /100
PLATELET # BLD AUTO: 165 10E3/UL (ref 150–450)
POTASSIUM SERPL-SCNC: 4.2 MMOL/L (ref 3.4–5.3)
PROT SERPL-MCNC: 6.8 G/DL (ref 6.4–8.3)
RBC # BLD AUTO: 3.83 10E6/UL (ref 4.4–5.9)
SODIUM SERPL-SCNC: 137 MMOL/L (ref 135–145)
WBC # BLD AUTO: 9.7 10E3/UL (ref 4–11)

## 2024-10-01 PROCEDURE — 96411 CHEMO IV PUSH ADDL DRUG: CPT

## 2024-10-01 PROCEDURE — 36415 COLL VENOUS BLD VENIPUNCTURE: CPT | Performed by: INTERNAL MEDICINE

## 2024-10-01 PROCEDURE — G0463 HOSPITAL OUTPT CLINIC VISIT: HCPCS | Mod: 25

## 2024-10-01 PROCEDURE — 96415 CHEMO IV INFUSION ADDL HR: CPT

## 2024-10-01 PROCEDURE — 96375 TX/PRO/DX INJ NEW DRUG ADDON: CPT

## 2024-10-01 PROCEDURE — 96368 THER/DIAG CONCURRENT INF: CPT

## 2024-10-01 PROCEDURE — 96367 TX/PROPH/DG ADDL SEQ IV INF: CPT

## 2024-10-01 PROCEDURE — 250N000011 HC RX IP 250 OP 636: Mod: JZ | Performed by: NURSE PRACTITIONER

## 2024-10-01 PROCEDURE — 99215 OFFICE O/P EST HI 40 MIN: CPT | Performed by: NURSE PRACTITIONER

## 2024-10-01 PROCEDURE — G0463 HOSPITAL OUTPT CLINIC VISIT: HCPCS

## 2024-10-01 PROCEDURE — 82435 ASSAY OF BLOOD CHLORIDE: CPT | Mod: ZL | Performed by: INTERNAL MEDICINE

## 2024-10-01 PROCEDURE — 258N000003 HC RX IP 258 OP 636: Performed by: NURSE PRACTITIONER

## 2024-10-01 PROCEDURE — 250N000011 HC RX IP 250 OP 636: Mod: JZ | Performed by: INTERNAL MEDICINE

## 2024-10-01 PROCEDURE — 96521 REFILL/MAINT PORTABLE PUMP: CPT | Mod: XU

## 2024-10-01 PROCEDURE — 96413 CHEMO IV INFUSION 1 HR: CPT

## 2024-10-01 PROCEDURE — 85025 COMPLETE CBC W/AUTO DIFF WBC: CPT | Mod: ZL | Performed by: INTERNAL MEDICINE

## 2024-10-01 RX ORDER — FLUOROURACIL 50 MG/ML
400 INJECTION, SOLUTION INTRAVENOUS ONCE
Status: COMPLETED | OUTPATIENT
Start: 2024-10-01 | End: 2024-10-01

## 2024-10-01 RX ORDER — ONDANSETRON 2 MG/ML
8 INJECTION INTRAMUSCULAR; INTRAVENOUS ONCE
Status: COMPLETED | OUTPATIENT
Start: 2024-10-01 | End: 2024-10-01

## 2024-10-01 RX ORDER — HEPARIN SODIUM,PORCINE 10 UNIT/ML
5-20 VIAL (ML) INTRAVENOUS DAILY PRN
Status: CANCELLED | OUTPATIENT
Start: 2024-10-01

## 2024-10-01 RX ORDER — HEPARIN SODIUM,PORCINE 10 UNIT/ML
5-20 VIAL (ML) INTRAVENOUS DAILY PRN
OUTPATIENT
Start: 2024-10-03

## 2024-10-01 RX ORDER — LORAZEPAM 2 MG/ML
0.5 INJECTION INTRAMUSCULAR EVERY 4 HOURS PRN
Status: CANCELLED | OUTPATIENT
Start: 2024-10-01

## 2024-10-01 RX ORDER — DIPHENHYDRAMINE HYDROCHLORIDE 50 MG/ML
50 INJECTION INTRAMUSCULAR; INTRAVENOUS
Status: CANCELLED
Start: 2024-10-01

## 2024-10-01 RX ORDER — ALBUTEROL SULFATE 90 UG/1
1-2 AEROSOL, METERED RESPIRATORY (INHALATION)
Status: CANCELLED
Start: 2024-10-01

## 2024-10-01 RX ORDER — HEPARIN SODIUM (PORCINE) LOCK FLUSH IV SOLN 100 UNIT/ML 100 UNIT/ML
5 SOLUTION INTRAVENOUS
Status: CANCELLED | OUTPATIENT
Start: 2024-10-01

## 2024-10-01 RX ORDER — ALBUTEROL SULFATE 0.83 MG/ML
2.5 SOLUTION RESPIRATORY (INHALATION)
Status: CANCELLED | OUTPATIENT
Start: 2024-10-01

## 2024-10-01 RX ORDER — MEPERIDINE HYDROCHLORIDE 25 MG/ML
25 INJECTION INTRAMUSCULAR; INTRAVENOUS; SUBCUTANEOUS EVERY 30 MIN PRN
Status: CANCELLED | OUTPATIENT
Start: 2024-10-01

## 2024-10-01 RX ORDER — METHYLPREDNISOLONE SODIUM SUCCINATE 125 MG/2ML
125 INJECTION, POWDER, LYOPHILIZED, FOR SOLUTION INTRAMUSCULAR; INTRAVENOUS
Status: CANCELLED
Start: 2024-10-01

## 2024-10-01 RX ORDER — FLUOROURACIL 50 MG/ML
400 INJECTION, SOLUTION INTRAVENOUS ONCE
Status: CANCELLED | OUTPATIENT
Start: 2024-10-01

## 2024-10-01 RX ORDER — HEPARIN SODIUM (PORCINE) LOCK FLUSH IV SOLN 100 UNIT/ML 100 UNIT/ML
5 SOLUTION INTRAVENOUS
OUTPATIENT
Start: 2024-10-03

## 2024-10-01 RX ORDER — EPINEPHRINE 1 MG/ML
0.3 INJECTION, SOLUTION, CONCENTRATE INTRAVENOUS EVERY 5 MIN PRN
Status: CANCELLED | OUTPATIENT
Start: 2024-10-01

## 2024-10-01 RX ADMIN — ONDANSETRON 8 MG: 2 INJECTION INTRAMUSCULAR; INTRAVENOUS at 10:28

## 2024-10-01 RX ADMIN — OXALIPLATIN 150 MG: 5 INJECTION, SOLUTION INTRAVENOUS at 11:11

## 2024-10-01 RX ADMIN — FOSAPREPITANT: 150 INJECTION, POWDER, LYOPHILIZED, FOR SOLUTION INTRAVENOUS at 10:28

## 2024-10-01 RX ADMIN — LEUCOVORIN CALCIUM 750 MG: 350 INJECTION, POWDER, LYOPHILIZED, FOR SUSPENSION INTRAMUSCULAR; INTRAVENOUS at 11:10

## 2024-10-01 RX ADMIN — DEXTROSE MONOHYDRATE 250 ML: 50 INJECTION, SOLUTION INTRAVENOUS at 10:32

## 2024-10-01 RX ADMIN — FLUOROURACIL 750 MG: 50 INJECTION, SOLUTION INTRAVENOUS at 13:39

## 2024-10-01 ASSESSMENT — PAIN SCALES - GENERAL: PAINLEVEL: NO PAIN (0)

## 2024-10-01 NOTE — PROGRESS NOTES
Infusion Nursing Note:  Tal Mcbride presents today for Labs from Port .    Patient seen by provider today: Yes: Bozena Bragg NP   present during visit today: Not Applicable.    Note: Patient reports he cut his port while shaving.  Patient presented with bloody discharge.  Did advise that I would not access the port at this time until a provider could weigh in.  Labs drawn peripherally.  Provider updated and chemo infusion updated as well.         Intravenous Access:  Labs drawn without difficulty.    Treatment Conditions:  Lab Results   Component Value Date    HGB 12.3 (L) 10/01/2024    WBC 9.7 10/01/2024    ANEUTAUTO 5.8 10/01/2024     10/01/2024        Lab Results   Component Value Date     10/01/2024    POTASSIUM 4.2 10/01/2024    CR 1.01 10/01/2024    RAUDEL 9.7 10/01/2024    BILITOTAL 0.5 10/01/2024    ALBUMIN 4.3 10/01/2024    ALT 25 10/01/2024    AST 25 10/01/2024       Results reviewed, labs MET treatment parameters, ok to proceed with treatment.      Post Infusion Assessment:  Patient advised infusion they will need to access his port.       Discharge Plan:   Patient discharged in stable condition accompanied by: wife.      Citlalli Reis RN

## 2024-10-01 NOTE — PROGRESS NOTES
Infusion Nursing Note:  Tal Mcbride presents today for Folfox.    Patient seen by provider today: Yes: Saida Bragg.   present during visit today: Not Applicable.    Note: Upon releasing chemo, there was a flag that patient has had a 10.6% weight gain since Aug 20th. Reached out to Saida Bragg, and orders received to continue with current ordered doses.       Treatment Conditions:  Results reviewed, labs MET treatment parameters, ok to proceed with treatment.  Not Applicable.      Post Infusion Assessment:  Patient tolerated infusion without incident.  Blood return noted pre and post infusion.  Blood return noted during administration every 2 ml during Fluorocil push.   Site patent and intact, free from redness, edema or discomfort.   Home infuser connected with clamps taped open.       Discharge Plan:   Patient and/or family verbalized understanding of discharge instructions and all questions answered.

## 2024-10-01 NOTE — NURSING NOTE
"Oncology Rooming Note    October 1, 2024 9:01 AM   Tal Mcbride is a 69 year old male who presents for:    Chief Complaint   Patient presents with    Oncology Clinic Visit     Follow up. Rectal cancer      Initial Vitals: /74 (BP Location: Left arm, Patient Position: Sitting, Cuff Size: Adult Regular)   Pulse 59   Temp 98  F (36.7  C) (Tympanic)   Ht 1.753 m (5' 9\")   Wt 80.1 kg (176 lb 9.4 oz)   SpO2 98%   BMI 26.08 kg/m   Estimated body mass index is 26.08 kg/m  as calculated from the following:    Height as of this encounter: 1.753 m (5' 9\").    Weight as of this encounter: 80.1 kg (176 lb 9.4 oz). Body surface area is 1.97 meters squared.  No Pain (0) Comment: Data Unavailable   No LMP for male patient.  Allergies reviewed: Yes  Medications reviewed: Yes    Medications: Medication refills not needed today.  Pharmacy name entered into [employer]: [facility] [id number] [facility]    Frailty Screening:   Is the patient here for a new oncology consult visit in cancer care? 2. No      Clinical concerns: none       Larissa Whiteside LPN              "

## 2024-10-01 NOTE — PROGRESS NOTES
Care Coordination Focused Note:    Met w/pt & spouse during infusion appt. Together we called [facility]'s financial dept. and asked for a status update for pt's financial assistance application.  Was advised it's still in process and pt would be notified via letter, email, and/or phone call once it's processed.  The rep. was unsure of the expected timeframe of when pt might hear the result. Was advised pt's account is on hold while the application processes; therefore, no amount will be sent to collections.    Writer emailed [email or web address] (suggested by rep.) to inquire if there are any additional documents needed for the application to process; carbon copied both pt and spouse in the email.    Writer plans to call [facility] to advise it has been over 90 days since patient has worked and ask that his SNAP benefits get instated.

## 2024-10-03 ENCOUNTER — TELEPHONE (OUTPATIENT)
Dept: CARE COORDINATION | Facility: OTHER | Age: 69
End: 2024-10-03

## 2024-10-03 NOTE — CONFIDENTIAL NOTE
Care Coordination Focused Note:    Called and spoke with pt. Together we called [facility] ([facility]) to verify they received his employment verification from "[employer] to process his SNAP(food) assistance application.  Was advised they received it, but it's not yet processed.  They note that this will count towards his 10/5/24 deadline. Was advised once the application is processed, they will call to notify patient.

## 2024-10-11 ENCOUNTER — HOSPITAL ENCOUNTER (OUTPATIENT)
Dept: CT IMAGING | Facility: HOSPITAL | Age: 69
Discharge: HOME | End: 2024-10-11
Attending: NURSE PRACTITIONER | Admitting: NURSE PRACTITIONER
Payer: COMMERCIAL

## 2024-10-11 DIAGNOSIS — C20 RECTAL CANCER (H): ICD-10-CM

## 2024-10-11 PROCEDURE — 250N000011 HC RX IP 250 OP 636: Performed by: RADIOLOGY

## 2024-10-11 PROCEDURE — 74177 CT ABD & PELVIS W/CONTRAST: CPT

## 2024-10-11 RX ORDER — HEPARIN SODIUM (PORCINE) LOCK FLUSH IV SOLN 100 UNIT/ML 100 UNIT/ML
5-10 SOLUTION INTRAVENOUS
Status: DISCONTINUED | OUTPATIENT
Start: 2024-10-11 | End: 2024-10-12 | Stop reason: HOSPADM

## 2024-10-11 RX ORDER — IOPAMIDOL 755 MG/ML
107 INJECTION, SOLUTION INTRAVASCULAR ONCE
Status: COMPLETED | OUTPATIENT
Start: 2024-10-11 | End: 2024-10-11

## 2024-10-11 RX ORDER — HEPARIN SODIUM,PORCINE 10 UNIT/ML
5-10 VIAL (ML) INTRAVENOUS EVERY 24 HOURS
Status: DISCONTINUED | OUTPATIENT
Start: 2024-10-11 | End: 2024-10-12 | Stop reason: HOSPADM

## 2024-10-11 RX ORDER — HEPARIN SODIUM,PORCINE 10 UNIT/ML
5-10 VIAL (ML) INTRAVENOUS
Status: DISCONTINUED | OUTPATIENT
Start: 2024-10-11 | End: 2024-10-12 | Stop reason: HOSPADM

## 2024-10-11 RX ADMIN — HEPARIN SODIUM (PORCINE) LOCK FLUSH IV SOLN 100 UNIT/ML 5 ML: 100 SOLUTION at 09:25

## 2024-10-11 RX ADMIN — IOPAMIDOL 107 ML: 755 INJECTION, SOLUTION INTRAVENOUS at 09:14

## 2024-10-15 ENCOUNTER — INFUSION THERAPY VISIT (OUTPATIENT)
Dept: INFUSION THERAPY | Facility: OTHER | Age: 69
End: 2024-10-15
Attending: INTERNAL MEDICINE
Payer: COMMERCIAL

## 2024-10-15 ENCOUNTER — MEDICAL CORRESPONDENCE (OUTPATIENT)
Dept: MRI IMAGING | Facility: HOSPITAL | Age: 69
End: 2024-10-15

## 2024-10-15 ENCOUNTER — ONCOLOGY VISIT (OUTPATIENT)
Dept: ONCOLOGY | Facility: OTHER | Age: 69
End: 2024-10-15
Attending: INTERNAL MEDICINE
Payer: COMMERCIAL

## 2024-10-15 VITALS
HEART RATE: 60 BPM | OXYGEN SATURATION: 98 % | TEMPERATURE: 96.9 F | SYSTOLIC BLOOD PRESSURE: 124 MMHG | BODY MASS INDEX: 26.11 KG/M2 | WEIGHT: 176.81 LBS | DIASTOLIC BLOOD PRESSURE: 62 MMHG

## 2024-10-15 VITALS — SYSTOLIC BLOOD PRESSURE: 143 MMHG | HEART RATE: 60 BPM | DIASTOLIC BLOOD PRESSURE: 77 MMHG

## 2024-10-15 DIAGNOSIS — C20 RECTAL CANCER (H): Primary | ICD-10-CM

## 2024-10-15 LAB
ALBUMIN SERPL BCG-MCNC: 4.2 G/DL (ref 3.5–5.2)
ALP SERPL-CCNC: 102 U/L (ref 40–150)
ALT SERPL W P-5'-P-CCNC: 32 U/L (ref 0–70)
ANION GAP SERPL CALCULATED.3IONS-SCNC: 15 MMOL/L (ref 7–15)
AST SERPL W P-5'-P-CCNC: 26 U/L (ref 0–45)
BASOPHILS # BLD AUTO: 0.1 10E3/UL (ref 0–0.2)
BASOPHILS NFR BLD AUTO: 1 %
BILIRUB SERPL-MCNC: 0.4 MG/DL
BUN SERPL-MCNC: 20.7 MG/DL (ref 8–23)
CALCIUM SERPL-MCNC: 9.4 MG/DL (ref 8.8–10.4)
CHLORIDE SERPL-SCNC: 99 MMOL/L (ref 98–107)
CREAT SERPL-MCNC: 0.94 MG/DL (ref 0.67–1.17)
EGFRCR SERPLBLD CKD-EPI 2021: 88 ML/MIN/1.73M2
EOSINOPHIL # BLD AUTO: 0.1 10E3/UL (ref 0–0.7)
EOSINOPHIL NFR BLD AUTO: 2 %
ERYTHROCYTE [DISTWIDTH] IN BLOOD BY AUTOMATED COUNT: 15.5 % (ref 10–15)
GLUCOSE SERPL-MCNC: 294 MG/DL (ref 70–99)
HCO3 SERPL-SCNC: 22 MMOL/L (ref 22–29)
HCT VFR BLD AUTO: 33.2 % (ref 40–53)
HGB BLD-MCNC: 11.7 G/DL (ref 13.3–17.7)
IMM GRANULOCYTES # BLD: 0.1 10E3/UL
IMM GRANULOCYTES NFR BLD: 1 %
LYMPHOCYTES # BLD AUTO: 1.9 10E3/UL (ref 0.8–5.3)
LYMPHOCYTES NFR BLD AUTO: 26 %
MCH RBC QN AUTO: 32.6 PG (ref 26.5–33)
MCHC RBC AUTO-ENTMCNC: 35.2 G/DL (ref 31.5–36.5)
MCV RBC AUTO: 93 FL (ref 78–100)
MONOCYTES # BLD AUTO: 0.9 10E3/UL (ref 0–1.3)
MONOCYTES NFR BLD AUTO: 12 %
NEUTROPHILS # BLD AUTO: 4.3 10E3/UL (ref 1.6–8.3)
NEUTROPHILS NFR BLD AUTO: 58 %
NRBC # BLD AUTO: 0 10E3/UL
NRBC BLD AUTO-RTO: 0 /100
PLATELET # BLD AUTO: 151 10E3/UL (ref 150–450)
POTASSIUM SERPL-SCNC: 4.4 MMOL/L (ref 3.4–5.3)
PROT SERPL-MCNC: 6.6 G/DL (ref 6.4–8.3)
RBC # BLD AUTO: 3.59 10E6/UL (ref 4.4–5.9)
SODIUM SERPL-SCNC: 136 MMOL/L (ref 135–145)
WBC # BLD AUTO: 7.3 10E3/UL (ref 4–11)

## 2024-10-15 PROCEDURE — 250N000011 HC RX IP 250 OP 636: Mod: JW | Performed by: INTERNAL MEDICINE

## 2024-10-15 PROCEDURE — 96375 TX/PRO/DX INJ NEW DRUG ADDON: CPT

## 2024-10-15 PROCEDURE — 85025 COMPLETE CBC W/AUTO DIFF WBC: CPT | Mod: ZL | Performed by: NURSE PRACTITIONER

## 2024-10-15 PROCEDURE — 80053 COMPREHEN METABOLIC PANEL: CPT | Mod: ZL | Performed by: NURSE PRACTITIONER

## 2024-10-15 PROCEDURE — 96521 REFILL/MAINT PORTABLE PUMP: CPT | Mod: XU

## 2024-10-15 PROCEDURE — 96411 CHEMO IV PUSH ADDL DRUG: CPT

## 2024-10-15 PROCEDURE — 82378 CARCINOEMBRYONIC ANTIGEN: CPT | Mod: ZL | Performed by: NURSE PRACTITIONER

## 2024-10-15 PROCEDURE — 258N000003 HC RX IP 258 OP 636: Performed by: INTERNAL MEDICINE

## 2024-10-15 PROCEDURE — 96413 CHEMO IV INFUSION 1 HR: CPT

## 2024-10-15 PROCEDURE — G0463 HOSPITAL OUTPT CLINIC VISIT: HCPCS

## 2024-10-15 PROCEDURE — 36415 COLL VENOUS BLD VENIPUNCTURE: CPT | Performed by: NURSE PRACTITIONER

## 2024-10-15 PROCEDURE — 96368 THER/DIAG CONCURRENT INF: CPT

## 2024-10-15 PROCEDURE — 96415 CHEMO IV INFUSION ADDL HR: CPT

## 2024-10-15 PROCEDURE — 99215 OFFICE O/P EST HI 40 MIN: CPT | Performed by: INTERNAL MEDICINE

## 2024-10-15 PROCEDURE — G0463 HOSPITAL OUTPT CLINIC VISIT: HCPCS | Mod: 25

## 2024-10-15 PROCEDURE — 96367 TX/PROPH/DG ADDL SEQ IV INF: CPT

## 2024-10-15 RX ORDER — ALBUTEROL SULFATE 0.83 MG/ML
2.5 SOLUTION RESPIRATORY (INHALATION)
Status: CANCELLED | OUTPATIENT
Start: 2024-10-15

## 2024-10-15 RX ORDER — HEPARIN SODIUM (PORCINE) LOCK FLUSH IV SOLN 100 UNIT/ML 100 UNIT/ML
5 SOLUTION INTRAVENOUS
Status: CANCELLED | OUTPATIENT
Start: 2024-10-15

## 2024-10-15 RX ORDER — HEPARIN SODIUM (PORCINE) LOCK FLUSH IV SOLN 100 UNIT/ML 100 UNIT/ML
5 SOLUTION INTRAVENOUS
Status: DISCONTINUED | OUTPATIENT
Start: 2024-10-15 | End: 2024-10-15 | Stop reason: HOSPADM

## 2024-10-15 RX ORDER — DIPHENHYDRAMINE HYDROCHLORIDE 50 MG/ML
50 INJECTION INTRAMUSCULAR; INTRAVENOUS
Status: CANCELLED
Start: 2024-10-15

## 2024-10-15 RX ORDER — MEPERIDINE HYDROCHLORIDE 25 MG/ML
25 INJECTION INTRAMUSCULAR; INTRAVENOUS; SUBCUTANEOUS EVERY 30 MIN PRN
Status: CANCELLED | OUTPATIENT
Start: 2024-10-15

## 2024-10-15 RX ORDER — ALBUTEROL SULFATE 90 UG/1
1-2 INHALANT RESPIRATORY (INHALATION)
Status: CANCELLED
Start: 2024-10-15

## 2024-10-15 RX ORDER — LORAZEPAM 2 MG/ML
0.5 INJECTION INTRAMUSCULAR EVERY 4 HOURS PRN
Status: CANCELLED | OUTPATIENT
Start: 2024-10-15

## 2024-10-15 RX ORDER — HEPARIN SODIUM (PORCINE) LOCK FLUSH IV SOLN 100 UNIT/ML 100 UNIT/ML
5 SOLUTION INTRAVENOUS
Status: CANCELLED | OUTPATIENT
Start: 2024-10-17

## 2024-10-15 RX ORDER — FLUOROURACIL 50 MG/ML
400 INJECTION, SOLUTION INTRAVENOUS ONCE
Status: CANCELLED | OUTPATIENT
Start: 2024-10-15

## 2024-10-15 RX ORDER — DEXAMETHASONE 4 MG/1
8 TABLET ORAL DAILY
Qty: 20 TABLET | Refills: 2 | Status: SHIPPED | OUTPATIENT
Start: 2024-10-15

## 2024-10-15 RX ORDER — HEPARIN SODIUM,PORCINE 10 UNIT/ML
5-20 VIAL (ML) INTRAVENOUS DAILY PRN
Status: CANCELLED | OUTPATIENT
Start: 2024-10-15

## 2024-10-15 RX ORDER — HEPARIN SODIUM,PORCINE 10 UNIT/ML
5-20 VIAL (ML) INTRAVENOUS DAILY PRN
Status: CANCELLED | OUTPATIENT
Start: 2024-10-17

## 2024-10-15 RX ORDER — EPINEPHRINE 1 MG/ML
0.3 INJECTION, SOLUTION, CONCENTRATE INTRAVENOUS EVERY 5 MIN PRN
Status: CANCELLED | OUTPATIENT
Start: 2024-10-15

## 2024-10-15 RX ORDER — ONDANSETRON 2 MG/ML
8 INJECTION INTRAMUSCULAR; INTRAVENOUS ONCE
Status: COMPLETED | OUTPATIENT
Start: 2024-10-15 | End: 2024-10-15

## 2024-10-15 RX ORDER — METHYLPREDNISOLONE SODIUM SUCCINATE 125 MG/2ML
125 INJECTION INTRAMUSCULAR; INTRAVENOUS
Status: CANCELLED
Start: 2024-10-15

## 2024-10-15 RX ORDER — FLUOROURACIL 50 MG/ML
800 INJECTION, SOLUTION INTRAVENOUS ONCE
Status: COMPLETED | OUTPATIENT
Start: 2024-10-15 | End: 2024-10-15

## 2024-10-15 RX ADMIN — LEUCOVORIN CALCIUM 800 MG: 500 INJECTION, POWDER, LYOPHILIZED, FOR SOLUTION INTRAMUSCULAR; INTRAVENOUS at 11:47

## 2024-10-15 RX ADMIN — DEXTROSE MONOHYDRATE 250 ML: 50 INJECTION, SOLUTION INTRAVENOUS at 11:45

## 2024-10-15 RX ADMIN — FLUOROURACIL 800 MG: 50 INJECTION, SOLUTION INTRAVENOUS at 13:59

## 2024-10-15 RX ADMIN — ONDANSETRON 8 MG: 2 INJECTION INTRAMUSCULAR; INTRAVENOUS at 10:40

## 2024-10-15 RX ADMIN — FOSAPREPITANT: 150 INJECTION, POWDER, LYOPHILIZED, FOR SOLUTION INTRAVENOUS at 10:42

## 2024-10-15 RX ADMIN — OXALIPLATIN 130 MG: 5 INJECTION, SOLUTION INTRAVENOUS at 11:51

## 2024-10-15 ASSESSMENT — PAIN SCALES - GENERAL: PAINLEVEL: NO PAIN (0)

## 2024-10-15 NOTE — PROGRESS NOTES
Infusion Nursing Note:  Tal Mcbride presents today for Labs for port.    Patient seen by provider today: Yes: Dr. Poon    present during visit today: Not Applicable.    Note: Patient offers no concerns or questions.      Intravenous Access:  Implanted Port.    Treatment Conditions:  Not Applicable.      Post Infusion Assessment:  Patient tolerated infusion without incidents.    Patient's port accessed, good brisk blood return. Port labs obtained per order.   Patient left ambulatory and will call for next appointment.       Discharge Plan:   Departure Mode: Ambulatory.      Citlalli Reis RN

## 2024-10-15 NOTE — NURSING NOTE
"Oncology Rooming Note    October 15, 2024 8:33 AM   Tal Mcbride is a 69 year old male who presents for:    Chief Complaint   Patient presents with    Oncology Clinic Visit     Follow up Rectal cancer      Initial Vitals: /62   Pulse 60   Temp 96.9  F (36.1  C) (Tympanic)   Wt 80.2 kg (176 lb 12.9 oz)   SpO2 98%   BMI 26.11 kg/m   Estimated body mass index is 26.11 kg/m  as calculated from the following:    Height as of 10/1/24: 1.753 m (5' 9\").    Weight as of this encounter: 80.2 kg (176 lb 12.9 oz). Body surface area is 1.98 meters squared.  No Pain (0) Comment: Data Unavailable   No LMP for male patient.  Allergies reviewed: Yes  Medications reviewed: Yes    Medications: Medication refills not needed today.  Pharmacy name entered into [employer]: [facility] [address] [place] [address] [id number] [facility] ([place], [place])    Frailty Screening:   Is the patient here for a new oncology consult visit in cancer care? 2. No      Clinical concerns: none       Angie Amaya LPN             "

## 2024-10-15 NOTE — PROGRESS NOTES
MEDICAL ONCOLOGY FOLLOW-UP NOTE  Oct 15, 2024    Reason for follow-up: Rectal cancer    HISTORY OF PRESENT ILLNESS  Tal Mcbride is a 69 year old male with PMH as stated below who is seen in the oncology clinic for rectal cancer.    His history in short is as follows:    Mr. Mcbride started having diarrhea in the end of March 2024.  He was evaluated by his primary care and recommended to undergo a colonoscopy and upper endoscopy.    4/29/2024: EGD: 3 cm hiatal hernia.  Normal stomach.  Normal third portion of the duodenum.  Nonobstructing Schatzki's ring.    4/29/2024: Sigmoidoscopy: The digital rectal exam revealed a soft tissue mass palpated 8 cm from the anal verge.  The mass was noncircumferential and located predominantly at the posterior bowel wall.  An infiltrative partially obstructing large mass was found in the rectum.  The mass was circumferential.  The mass measured 4 cm in length.  No bleeding was present.  This was biopsied.  A pediatric colonoscope was passed past the area but patient had a large quantity of stool present just above the stricture so colonoscopy was not able to be completed.    Biopsy of the rectal mass showed moderately to poorly differentiated adenocarcinoma.    5/2/2024 CT chest with contrast: No definite findings for metastatic disease in the chest. 2 small pulmonary nodules measuring up to 4 mm unchanged from prior study     5/2/2024: MRI pelvis with and without contrast: A carcinoma involving the lower, middle and upper rectum extending over a length of roughly 12 cm demonstrated.  This lesion involves the mesorectum with no significant invasion anterolaterally on the right at the level of the mid rectum.  At this site infiltrative neoplasm is contactless with the posterior margin of the right seminal vesicle but it is not clearly invaded.  Neoplasm extends roughly 1.5 cm beyond the rectal margin.  Numerous enlarged mesorectal lymph nodes are demonstrated.  The larger mesorectal  lymph node is demonstrated on the right measuring 1.8X 1.5 cm.  Additional mesorectal lymph nodes with short axis measurements at least 1 cm demonstrated.  Inferior mesenteric lymph nodes with short axis measurement of 1.1 cm X 1.0 cm are noted.  A left internal iliac node measuring 1.2X 0.8 cm is demonstrated.  A 2.7 X1.8 centimeter right iliac node is demonstrated.  The other visualized bowel is unremarkable.  The prostate is mildly enlarged.  No free fluid is demonstrated.    5/13/2024: CT abdomen and pelvis without contrast: Large infiltrative rectal mass involving the upper middle and lower rectum.  There is surrounding inflammatory change and pelvic free fluid.  There is adjacent mesorectal adenopathy, a left internal iliac node as well.  Large amount of stool throughout the colon.  Enlarged prostate gland.  Left probable UPJ obstruction.    6/6/2024: Diverting colostomy    7/24/2024: Cycle 1 of FOLFOX    7/25/2024: PET scan: Masslike thickening with associated FDG avidity throughout the rectum is consistent with given history.  Metastatic disease within the liver, retroperitoneal nodes, perirectal nodes and inguinal nodes.  Findings are concerning for a UPJ obstruction.  Nuclear medicine renal Lasix scan for further characterize.    10/11/2024: CT chest, abdomen and pelvis: IMPRESSION: There is a new area of abnormal low density in the liver  suspicious for metastatic disease. There has been decrease in size in  a liver nodule seen in the medial segment of the left lobe from  previous examination. Other liver lesions seen on the PET CT scan were  not resolved by CT scanning. A left inguinal lymph node with abnormal  hypermetabolism on the PET CT scan has decreased in size as compared  to previous PET/CT examination.    Interim history:    Mr. Mcbride is doing well today.  He denies any worsening nausea or diarrhea. Pain is much better  and the ostomy is working well. Has been having increased cold sensitivity.  Has a good appetite. Noticed a rash over his right shoulder and around the port. No fever or chills. Denies any abdominal pain but does still pass mucous from his anus.     REVIEW OF SYSTEMS  A 12-point ROS negative except as in HPI      Current Outpatient Medications   Medication Sig Dispense Refill    aspirin 81 MG EC tablet Take 81 mg by mouth daily      atorvastatin (LIPITOR) 20 MG tablet Take 20 mg by mouth daily      dexAMETHasone (DECADRON) 4 MG tablet Take 2 tablets (8 mg) by mouth daily. Take for 2 days, starting the day after chemo. Take with food. 20 tablet 2    hydrochlorothiazide (HYDRODIURIL) 25 MG tablet Take 25 mg by mouth daily      lidocaine-prilocaine (EMLA) 2.5-2.5 % external cream Apply topically daily as needed for moderate pain 30 g 1    lisinopril (ZESTRIL) 40 MG tablet Take 40 mg by mouth daily      metFORMIN (GLUCOPHAGE-XR) 750 MG 24 hr tablet Take 750 mg by mouth 2 times daily (with meals)      multivitamin w/minerals (MULTI-VITAMIN) tablet Take 1 tablet by mouth daily      Polyethylene Glycol 3350 (MIRALAX PO) Take 1 Capful by mouth daily      acetaminophen (TYLENOL) 500 MG tablet Take 650 mg by mouth every 6 hours as needed for mild pain (Patient not taking: Reported on 8/20/2024)      loperamide (IMODIUM A-D) 2 MG tablet Take 1 tablet (2 mg) by mouth 4 times daily as needed for diarrhea (Patient not taking: Reported on 9/17/2024) 90 tablet 0    ondansetron (ZOFRAN) 8 MG tablet Take 1 tablet (8 mg) by mouth every 8 hours as needed for nausea (vomiting) (Patient not taking: Reported on 9/17/2024) 30 tablet 2    Oxymetazoline HCl (NASAL DECONGESTANT SPRAY NA) Spray in nostril as needed (Patient not taking: Reported on 9/17/2024)      prochlorperazine (COMPAZINE) 5 MG tablet Take 1 tablet (5 mg) by mouth every 6 hours as needed for nausea or vomiting (Patient not taking: Reported on 9/17/2024) 90 tablet 0    traMADol (ULTRAM) 50 MG tablet Take 1 tablet (50 mg) by mouth every 6 hours as  needed for severe pain (Patient not taking: Reported on 9/17/2024) 40 tablet 0    traZODone (DESYREL) 100 MG tablet Take 100 mg by mouth at bedtime (Patient not taking: Reported on 8/6/2024)         No Known Allergies  Immunization History   Administered Date(s) Administered    COVID-19 Bivalent 12+ (Pfizer) 10/27/2022    COVID-19 Monovalent 18+ (Moderna) 01/16/2021, 02/13/2021, 11/29/2021    Hepatitis B, Adult 10/03/2013, 03/28/2014, 12/30/2014    Influenza Vaccine (Flucelvax Quadrivalent) 10/07/2022    Pneumococcal 23 valent 10/03/2013    TDAP (Adacel,Boostrix) 10/03/2012       Past Medical History:   Diagnosis Date    Adolescent idiopathic scoliosis of thoracolumbar region 11/16/2017    Bunion 03/28/2013    Essential hypertension 03/05/2012    Formatting of this note might be different from the original.   Epic      Pure hypercholesterolemia 10/04/2012    Rectal cancer (H) 05/29/2024    Severe protein-calorie malnutrition (H) 06/07/2024    Type 2 diabetes mellitus without complication (H) 06/21/2012       Past Surgical History:   Procedure Laterality Date    CATARACT EXTRACTION W/ INTRAOCULAR LENS IMPLANT Left 2021    ENDOSCOPY N/A     upper and lower    GI SURGERY      PHACOEMULSIFICATION WITH STANDARD INTRAOCULAR LENS IMPLANT Right 09/29/2021    Procedure: right Cataract Extraction with Implant;  Surgeon: Juan Prado MD;  Location: [facility] OR    robotic colostomy N/A 06/06/2024       SOCIAL HISTORY  History   Smoking Status    Former    Types: Cigarettes   Smokeless Tobacco    Former    Social History    Substance and Sexual Activity      Alcohol use: Not Currently     History   Drug Use Unknown       FAMILY HISTORY  Family History   Problem Relation Age of Onset    Pancreatic Cancer Father 55       PHYSICAL EXAMINATION  /62   Pulse 60   Temp 96.9  F (36.1  C) (Tympanic)   Wt 80.2 kg (176 lb 12.9 oz)   SpO2 98%   BMI 26.11 kg/m    Wt Readings from Last 2 Encounters:   10/15/24 80.2 kg (176 lb  12.9 oz)   10/01/24 80.1 kg (176 lb 9.4 oz)     Physical Exam  Constitutional:       Appearance: Normal appearance.   Abdominal:      General: Abdomen is flat.   Neurological:      General: No focal deficit present.      Mental Status: He is alert and oriented to person, place, and time.   Psychiatric:         Mood and Affect: Mood normal.     Laboratory and imaging:     Latest Reference Range & Units 10/15/24 08:25   WBC 4.0 - 11.0 10e3/uL 7.3   Hemoglobin 13.3 - 17.7 g/dL 11.7 (L)   Hematocrit 40.0 - 53.0 % 33.2 (L)   Platelet Count 150 - 450 10e3/uL 151   (L): Data is abnormally low    Foundation one: no reportable RNA variants EGFR amplification KRAS wildtype NRAS wildtype APC * ARIDIA * TP53 splice site [id number]>G Diseas  e relevant genes with no reportable alterations: BRAF, ERBB2, KRAS, NRAS.  ASSESSMENT AND PLAN    1.  Stage IV rectal cancer:    Initially presented with diarrhea in March 2024.  Sigmoidoscopy showed rectal mass infiltrative partially obstructing large mass 8 cm from the anal verge.  MRI pelvis showed carcinoma involving lower, middle and upper rectum extending over a length of roughly 12 cm.  Lesion involves the mesorectum but most significant invasion anterolaterally on the right at the level of the mid rectum.  Neoplasm is contiguous with the posterior margin of the right seminal vesicle but is not clearly invading it.  Extends roughly 1.5 cm beyond the rectal margin.  It also showed numerous enlarged mesorectal lymph nodes.  A inferior mesenteric lymph node was also noted.  There were also enlarged left iliac nodes and right iliac node.    He has already had a diverting ostomy by  on 6/6/2024.    He was then found to have metastatic disease on PET scan.   PET scan showed 5 foci of abnormal avidity present within the liver, involvement of the common iliac lymph node, upper abdominal para-aortic lymph node, right common iliac node and left perirectal node.  A few inguinal  nodes are also avid.    Currently on palliative chemotherapy with FOLFOX which he started on 7/24/2024. He is status post cycle 6.  He is symptomatically doing much better. His staging CT scans showed new possible liver met but overall response. His CEA has also decreased. For now I would recommend we continue treatment. Given the neuropathy we can dose reduce oxaliplatin to 65 mg/m2.     His foundation 1 results are available today.  They do not show any mutations in KRAS, NRAS or BRAF.  The sample did have low tumor purity.  We discussed the addition of bevacizumab today.  We discussed the expected side effects of hemorrhage, high blood pressure, blood clot, fistula.    He will be having ostomy reversal with his surgeon therefore will hold off addition of bevacizumab.      Follow-up in clinic prior to cycle 10.      Shawna Poon MD

## 2024-10-15 NOTE — PROGRESS NOTES
"Infusion Nursing Note:  Tal Mcbride presents today for folfox.    Patient seen by provider today: Yes: Dr Poon   present during visit today: Not Applicable.    Note: N/A.      Intravenous Access:  Implanted Port.    Treatment Conditions:  Lab Results   Component Value Date    HGB 11.7 (L) 10/15/2024    WBC 7.3 10/15/2024    ANEUTAUTO 4.3 10/15/2024     10/15/2024        Lab Results   Component Value Date     10/15/2024    POTASSIUM 4.4 10/15/2024    CR 0.94 10/15/2024    RAUDEL 9.4 10/15/2024    BILITOTAL 0.4 10/15/2024    ALBUMIN 4.2 10/15/2024    ALT 32 10/15/2024    AST 26 10/15/2024       Results reviewed, labs MET treatment parameters, ok to proceed with treatment.      Post Infusion Assessment:  Patient tolerated infusion without incident.  Blood return noted pre and post infusion.  Site patent and intact, free from redness, edema or discomfort.   Prior to discharge: Port is secured in place with tegaderm and flushed with 10cc NS with positive blood return noted.  Continuous home infusion CADD pump connected.    All connectors secured in place and clamps taped open.    Pump started, \"running\" noted on display (CADD): YES.    Patient instructed to call our clinic or [facility] with any questions or concerns at home.  Patient verbalized understanding.    Patient set up for pump disconnect [facility] on  10/17       Discharge Plan:   Patient and/or family verbalized understanding of discharge instructions and all questions answered.      HARPREET ACEVEDO    "

## 2024-10-16 LAB — CEA SERPL-MCNC: 5.9 NG/ML

## 2024-10-21 ENCOUNTER — MEDICAL CORRESPONDENCE (OUTPATIENT)
Dept: HOME HEALTH SERVICES | Facility: HOME HEALTH | Age: 69
End: 2024-10-21
Payer: COMMERCIAL

## 2024-10-22 ENCOUNTER — HOME INFUSION (OUTPATIENT)
Dept: HOME HEALTH SERVICES | Facility: HOME HEALTH | Age: 69
End: 2024-10-22
Payer: COMMERCIAL

## 2024-10-28 NOTE — PROGRESS NOTES
AUTH REQUIRED DAY ORDERS (or CHANGE IN ORDERS) ARE RECEIVED. PLEASE NOTIFY PA TEAM ONCE ORDERS RECEIVED.     07/22/2024 (5FU) MUST BE ON CADD FOR INSURANCE COVERAGE. IF NOT, RETURN TO INTAKE FOR SELF PAY QUOTE.   OTHER THERAPY: RETURN TO INTAKE FOR COVERAGE DETERMINATION.     PT HAS COVERAGE FOR LINE CARE, TPA, HYDRATION AND HOME DISCONNECTS.  CAN BE IVN, HB VS NON HB DOES NOT MATTER WITH THIS PLAN.  TL

## 2024-10-29 ENCOUNTER — CARE COORDINATION (OUTPATIENT)
Dept: CARE COORDINATION | Facility: OTHER | Age: 69
End: 2024-10-29

## 2024-10-29 ENCOUNTER — HOSPITAL ENCOUNTER (OUTPATIENT)
Dept: MRI IMAGING | Facility: HOSPITAL | Age: 69
Discharge: HOME | End: 2024-10-29
Attending: SURGERY
Payer: COMMERCIAL

## 2024-10-29 ENCOUNTER — INFUSION THERAPY VISIT (OUTPATIENT)
Dept: INFUSION THERAPY | Facility: OTHER | Age: 69
End: 2024-10-29
Attending: INTERNAL MEDICINE
Payer: COMMERCIAL

## 2024-10-29 VITALS
HEART RATE: 75 BPM | WEIGHT: 178.79 LBS | SYSTOLIC BLOOD PRESSURE: 149 MMHG | DIASTOLIC BLOOD PRESSURE: 72 MMHG | TEMPERATURE: 97.3 F | BODY MASS INDEX: 26.48 KG/M2 | OXYGEN SATURATION: 96 % | HEIGHT: 69 IN

## 2024-10-29 DIAGNOSIS — C20 RECTAL CANCER (H): Primary | ICD-10-CM

## 2024-10-29 DIAGNOSIS — C20 RECTAL CANCER (H): ICD-10-CM

## 2024-10-29 LAB
ALBUMIN SERPL BCG-MCNC: 4.3 G/DL (ref 3.5–5.2)
ALP SERPL-CCNC: 90 U/L (ref 40–150)
ALT SERPL W P-5'-P-CCNC: 31 U/L (ref 0–70)
ANION GAP SERPL CALCULATED.3IONS-SCNC: 13 MMOL/L (ref 7–15)
AST SERPL W P-5'-P-CCNC: 29 U/L (ref 0–45)
BASOPHILS # BLD AUTO: 0.1 10E3/UL (ref 0–0.2)
BASOPHILS NFR BLD AUTO: 1 %
BILIRUB SERPL-MCNC: 0.5 MG/DL
BUN SERPL-MCNC: 24.2 MG/DL (ref 8–23)
CALCIUM SERPL-MCNC: 9.4 MG/DL (ref 8.8–10.4)
CHLORIDE SERPL-SCNC: 100 MMOL/L (ref 98–107)
CREAT SERPL-MCNC: 1.01 MG/DL (ref 0.67–1.17)
EGFRCR SERPLBLD CKD-EPI 2021: 81 ML/MIN/1.73M2
EOSINOPHIL # BLD AUTO: 0.1 10E3/UL (ref 0–0.7)
EOSINOPHIL NFR BLD AUTO: 2 %
ERYTHROCYTE [DISTWIDTH] IN BLOOD BY AUTOMATED COUNT: 15.3 % (ref 10–15)
GLUCOSE SERPL-MCNC: 227 MG/DL (ref 70–99)
HCO3 SERPL-SCNC: 22 MMOL/L (ref 22–29)
HCT VFR BLD AUTO: 33 % (ref 40–53)
HGB BLD-MCNC: 11.5 G/DL (ref 13.3–17.7)
IMM GRANULOCYTES # BLD: 0.1 10E3/UL
IMM GRANULOCYTES NFR BLD: 1 %
LYMPHOCYTES # BLD AUTO: 1.8 10E3/UL (ref 0.8–5.3)
LYMPHOCYTES NFR BLD AUTO: 24 %
MCH RBC QN AUTO: 32.8 PG (ref 26.5–33)
MCHC RBC AUTO-ENTMCNC: 34.8 G/DL (ref 31.5–36.5)
MCV RBC AUTO: 94 FL (ref 78–100)
MONOCYTES # BLD AUTO: 0.8 10E3/UL (ref 0–1.3)
MONOCYTES NFR BLD AUTO: 10 %
NEUTROPHILS # BLD AUTO: 4.7 10E3/UL (ref 1.6–8.3)
NEUTROPHILS NFR BLD AUTO: 63 %
NRBC # BLD AUTO: 0 10E3/UL
NRBC BLD AUTO-RTO: 0 /100
PLATELET # BLD AUTO: 126 10E3/UL (ref 150–450)
POTASSIUM SERPL-SCNC: 4.1 MMOL/L (ref 3.4–5.3)
PROT SERPL-MCNC: 6.6 G/DL (ref 6.4–8.3)
RBC # BLD AUTO: 3.51 10E6/UL (ref 4.4–5.9)
SODIUM SERPL-SCNC: 135 MMOL/L (ref 135–145)
WBC # BLD AUTO: 7.5 10E3/UL (ref 4–11)

## 2024-10-29 PROCEDURE — 85004 AUTOMATED DIFF WBC COUNT: CPT | Mod: ZL | Performed by: NURSE PRACTITIONER

## 2024-10-29 PROCEDURE — 82040 ASSAY OF SERUM ALBUMIN: CPT | Mod: ZL | Performed by: NURSE PRACTITIONER

## 2024-10-29 PROCEDURE — 250N000011 HC RX IP 250 OP 636: Mod: JZ | Performed by: NURSE PRACTITIONER

## 2024-10-29 PROCEDURE — 255N000002 HC RX 255 OP 636: Performed by: RADIOLOGY

## 2024-10-29 PROCEDURE — 85018 HEMOGLOBIN: CPT | Mod: ZL | Performed by: NURSE PRACTITIONER

## 2024-10-29 PROCEDURE — 250N000011 HC RX IP 250 OP 636: Mod: JZ | Performed by: INTERNAL MEDICINE

## 2024-10-29 PROCEDURE — 82378 CARCINOEMBRYONIC ANTIGEN: CPT | Mod: ZL

## 2024-10-29 PROCEDURE — A9585 GADOBUTROL INJECTION: HCPCS | Performed by: RADIOLOGY

## 2024-10-29 PROCEDURE — 36591 DRAW BLOOD OFF VENOUS DEVICE: CPT | Performed by: NURSE PRACTITIONER

## 2024-10-29 PROCEDURE — 72197 MRI PELVIS W/O & W/DYE: CPT

## 2024-10-29 PROCEDURE — 258N000003 HC RX IP 258 OP 636: Performed by: NURSE PRACTITIONER

## 2024-10-29 RX ORDER — MEPERIDINE HYDROCHLORIDE 25 MG/ML
25 INJECTION INTRAMUSCULAR; INTRAVENOUS; SUBCUTANEOUS EVERY 30 MIN PRN
Status: CANCELLED | OUTPATIENT
Start: 2024-10-29

## 2024-10-29 RX ORDER — ONDANSETRON 2 MG/ML
8 INJECTION INTRAMUSCULAR; INTRAVENOUS ONCE
Status: COMPLETED | OUTPATIENT
Start: 2024-10-29 | End: 2024-10-29

## 2024-10-29 RX ORDER — EPINEPHRINE 1 MG/ML
0.3 INJECTION, SOLUTION, CONCENTRATE INTRAVENOUS EVERY 5 MIN PRN
Status: CANCELLED | OUTPATIENT
Start: 2024-10-29

## 2024-10-29 RX ORDER — FLUOROURACIL 50 MG/ML
400 INJECTION, SOLUTION INTRAVENOUS ONCE
Status: COMPLETED | OUTPATIENT
Start: 2024-10-29 | End: 2024-10-29

## 2024-10-29 RX ORDER — HEPARIN SODIUM,PORCINE 10 UNIT/ML
5-20 VIAL (ML) INTRAVENOUS DAILY PRN
Status: CANCELLED | OUTPATIENT
Start: 2024-10-29

## 2024-10-29 RX ORDER — ALBUTEROL SULFATE 90 UG/1
1-2 INHALANT RESPIRATORY (INHALATION)
Status: CANCELLED
Start: 2024-10-29

## 2024-10-29 RX ORDER — HEPARIN SODIUM (PORCINE) LOCK FLUSH IV SOLN 100 UNIT/ML 100 UNIT/ML
5 SOLUTION INTRAVENOUS
Status: CANCELLED | OUTPATIENT
Start: 2024-10-29

## 2024-10-29 RX ORDER — HEPARIN SODIUM,PORCINE 10 UNIT/ML
5-20 VIAL (ML) INTRAVENOUS DAILY PRN
Status: CANCELLED | OUTPATIENT
Start: 2024-10-31

## 2024-10-29 RX ORDER — METHYLPREDNISOLONE SODIUM SUCCINATE 125 MG/2ML
125 INJECTION INTRAMUSCULAR; INTRAVENOUS
Status: CANCELLED
Start: 2024-10-29

## 2024-10-29 RX ORDER — ALBUTEROL SULFATE 0.83 MG/ML
2.5 SOLUTION RESPIRATORY (INHALATION)
Status: CANCELLED | OUTPATIENT
Start: 2024-10-29

## 2024-10-29 RX ORDER — HEPARIN SODIUM (PORCINE) LOCK FLUSH IV SOLN 100 UNIT/ML 100 UNIT/ML
5 SOLUTION INTRAVENOUS
Status: CANCELLED | OUTPATIENT
Start: 2024-10-31

## 2024-10-29 RX ORDER — GADOBUTROL 604.72 MG/ML
7.5 INJECTION INTRAVENOUS ONCE
Status: COMPLETED | OUTPATIENT
Start: 2024-10-29 | End: 2024-10-29

## 2024-10-29 RX ORDER — DIPHENHYDRAMINE HYDROCHLORIDE 50 MG/ML
50 INJECTION INTRAMUSCULAR; INTRAVENOUS
Status: CANCELLED
Start: 2024-10-29

## 2024-10-29 RX ORDER — LORAZEPAM 2 MG/ML
0.5 INJECTION INTRAMUSCULAR EVERY 4 HOURS PRN
Status: CANCELLED | OUTPATIENT
Start: 2024-10-29

## 2024-10-29 RX ADMIN — FLUOROURACIL 790 MG: 50 INJECTION, SOLUTION INTRAVENOUS at 13:53

## 2024-10-29 RX ADMIN — DEXTROSE MONOHYDRATE 250 ML: 50 INJECTION, SOLUTION INTRAVENOUS at 11:17

## 2024-10-29 RX ADMIN — ONDANSETRON 8 MG: 2 INJECTION INTRAMUSCULAR; INTRAVENOUS at 11:19

## 2024-10-29 RX ADMIN — GADOBUTROL 7.5 ML: 604.72 INJECTION INTRAVENOUS at 09:29

## 2024-10-29 RX ADMIN — LEUCOVORIN CALCIUM 800 MG: 500 INJECTION, POWDER, LYOPHILIZED, FOR SOLUTION INTRAMUSCULAR; INTRAVENOUS at 11:53

## 2024-10-29 RX ADMIN — FOSAPREPITANT: 150 INJECTION, POWDER, LYOPHILIZED, FOR SOLUTION INTRAVENOUS at 11:25

## 2024-10-29 RX ADMIN — OXALIPLATIN 130 MG: 5 INJECTION, SOLUTION INTRAVENOUS at 11:53

## 2024-10-29 ASSESSMENT — PAIN SCALES - GENERAL: PAINLEVEL_OUTOF10: NO PAIN (0)

## 2024-10-29 NOTE — PROGRESS NOTES
Infusion Nursing Note:  Tal Mcbride presents today for Folfox.    Patient seen by provider today: No   present during visit today: Not Applicable.    Note: N/A.    Intravenous Access:  Implanted Port.  Labs drawn without difficulty from implanted port.    Treatment Conditions:  Results reviewed, labs MET treatment parameters, ok to proceed with treatment.    Component      Latest Ref Rng 10/29/2024  10:29 AM   WBC      4.0 - 11.0 10e3/uL 7.5    RBC Count      4.40 - 5.90 10e6/uL 3.51 (L)    Hemoglobin      13.3 - 17.7 g/dL 11.5 (L)    Hematocrit      40.0 - 53.0 % 33.0 (L)    MCV      78 - 100 fL 94    MCH      26.5 - 33.0 pg 32.8    MCHC      31.5 - 36.5 g/dL 34.8    RDW      10.0 - 15.0 % 15.3 (H)    Platelet Count      150 - 450 10e3/uL 126 (L)    % Neutrophils      % 63    % Lymphocytes      % 24    % Monocytes      % 10    % Eosinophils      % 2    % Basophils      % 1    % Immature Granulocytes      % 1    NRBCs per 100 WBC      <1 /100 0    Absolute Neutrophils      1.6 - 8.3 10e3/uL 4.7    Absolute Lymphocytes      0.8 - 5.3 10e3/uL 1.8    Absolute Monocytes      0.0 - 1.3 10e3/uL 0.8    Absolute Eosinophils      0.0 - 0.7 10e3/uL 0.1    Absolute Basophils      0.0 - 0.2 10e3/uL 0.1    Absolute Immature Granulocytes      <=0.4 10e3/uL 0.1    Absolute NRBCs      10e3/uL 0.0    Sodium      135 - 145 mmol/L 135    Potassium      3.4 - 5.3 mmol/L 4.1    Carbon Dioxide (CO2)      22 - 29 mmol/L 22    Anion Gap      7 - 15 mmol/L 13    Urea Nitrogen      8.0 - 23.0 mg/dL 24.2 (H)    Creatinine      0.67 - 1.17 mg/dL 1.01    GFR Estimate      >60 mL/min/1.73m2 81    Calcium      8.8 - 10.4 mg/dL 9.4    Chloride      98 - 107 mmol/L 100    Glucose      70 - 99 mg/dL 227 (H)    Alkaline Phosphatase      40 - 150 U/L 90    AST      0 - 45 U/L 29    ALT      0 - 70 U/L 31    Protein Total      6.4 - 8.3 g/dL 6.6    Albumin      3.5 - 5.2 g/dL 4.3    Bilirubin Total      <=1.2 mg/dL 0.5       Legend:  (L)  "Low  (H) High    Post Infusion Assessment:  Patient tolerated infusion without incident.  Blood return noted pre and post infusion.  Site patent and intact, free from redness, edema or discomfort.  No evidence of extravasations.     Prior to discharge: Port is secured in place with tegaderm and flushed with 10cc NS with positive blood return noted.    Continuous home infusion CADD pump connected.    All connectors secured in place and clamps taped open.    Pump started, \"running\" noted on display (CADD): YES.   Capillary element taped to pt's skin per protocol.  Patient instructed to call our clinic or [facility] with any questions or concerns at home.  Patient verbalized understanding.    Patient set up for pump disconnect [facility] on 10/31/24.       Discharge Plan:   Patient discharged in stable condition accompanied by: wife.  Departure Mode: Ambulatory.    [facility] delivery ticket secure emailed to [facility] and [facility],    [facility] delivery ticket sent to scanning.    "

## 2024-10-29 NOTE — PROGRESS NOTES
Care Coordination Focused Note:    Met w/pt during infusion appt. His spouse, Gayle, was present. Together we filled out an application for [employer]. Patient and spouse note they'd like to go the route of subsidized housing rather than obtaining a Section 8 housing voucher.    We filled out an application for [facility] financial assistance program.  Writer sent this via secure email.    Patient filled out another a second application for [employer].  Writer sent this via secure email.    Will remain available to assist patient as wanted/needed.

## 2024-10-30 ENCOUNTER — HOME INFUSION (OUTPATIENT)
Dept: HOME HEALTH SERVICES | Facility: HOME HEALTH | Age: 69
End: 2024-10-30
Payer: COMMERCIAL

## 2024-10-30 DIAGNOSIS — C20 RECTAL CANCER (H): Primary | ICD-10-CM

## 2024-10-30 LAB — CEA SERPL-MCNC: 5.4 NG/ML

## 2024-11-08 ENCOUNTER — HOME INFUSION BILLING (OUTPATIENT)
Dept: HOME HEALTH SERVICES | Facility: HOME HEALTH | Age: 69
End: 2024-11-08
Payer: COMMERCIAL

## 2024-11-08 PROCEDURE — E1399 DURABLE MEDICAL EQUIPMENT MI: HCPCS

## 2024-11-08 PROCEDURE — E0781 EXTERNAL AMBULATORY INFUS PU: HCPCS | Mod: RR

## 2024-11-12 ENCOUNTER — LAB (OUTPATIENT)
Dept: ONCOLOGY | Facility: OTHER | Age: 69
End: 2024-11-12
Attending: INTERNAL MEDICINE
Payer: COMMERCIAL

## 2024-11-12 ENCOUNTER — INFUSION THERAPY VISIT (OUTPATIENT)
Dept: INFUSION THERAPY | Facility: OTHER | Age: 69
End: 2024-11-12
Attending: INTERNAL MEDICINE
Payer: COMMERCIAL

## 2024-11-12 VITALS
BODY MASS INDEX: 26.12 KG/M2 | OXYGEN SATURATION: 97 % | DIASTOLIC BLOOD PRESSURE: 70 MMHG | TEMPERATURE: 96.9 F | HEART RATE: 58 BPM | WEIGHT: 176.37 LBS | HEIGHT: 69 IN | SYSTOLIC BLOOD PRESSURE: 110 MMHG | RESPIRATION RATE: 20 BRPM

## 2024-11-12 VITALS — HEART RATE: 55 BPM | DIASTOLIC BLOOD PRESSURE: 56 MMHG | SYSTOLIC BLOOD PRESSURE: 117 MMHG

## 2024-11-12 DIAGNOSIS — C20 RECTAL CANCER (H): Primary | ICD-10-CM

## 2024-11-12 LAB
ALBUMIN SERPL BCG-MCNC: 4.4 G/DL (ref 3.5–5.2)
ALP SERPL-CCNC: 95 U/L (ref 40–150)
ALT SERPL W P-5'-P-CCNC: 34 U/L (ref 0–70)
ANION GAP SERPL CALCULATED.3IONS-SCNC: 15 MMOL/L (ref 7–15)
AST SERPL W P-5'-P-CCNC: 30 U/L (ref 0–45)
BASOPHILS # BLD AUTO: 0.1 10E3/UL (ref 0–0.2)
BASOPHILS NFR BLD AUTO: 1 %
BILIRUB SERPL-MCNC: 0.4 MG/DL
BUN SERPL-MCNC: 26.2 MG/DL (ref 8–23)
CALCIUM SERPL-MCNC: 9.8 MG/DL (ref 8.8–10.4)
CHLORIDE SERPL-SCNC: 100 MMOL/L (ref 98–107)
CREAT SERPL-MCNC: 1.12 MG/DL (ref 0.67–1.17)
EGFRCR SERPLBLD CKD-EPI 2021: 71 ML/MIN/1.73M2
EOSINOPHIL # BLD AUTO: 0.1 10E3/UL (ref 0–0.7)
EOSINOPHIL NFR BLD AUTO: 2 %
ERYTHROCYTE [DISTWIDTH] IN BLOOD BY AUTOMATED COUNT: 14.9 % (ref 10–15)
GLUCOSE SERPL-MCNC: 168 MG/DL (ref 70–99)
HCO3 SERPL-SCNC: 23 MMOL/L (ref 22–29)
HCT VFR BLD AUTO: 32.7 % (ref 40–53)
HGB BLD-MCNC: 11.6 G/DL (ref 13.3–17.7)
IMM GRANULOCYTES # BLD: 0.1 10E3/UL
IMM GRANULOCYTES NFR BLD: 1 %
LYMPHOCYTES # BLD AUTO: 2.1 10E3/UL (ref 0.8–5.3)
LYMPHOCYTES NFR BLD AUTO: 29 %
MCH RBC QN AUTO: 33 PG (ref 26.5–33)
MCHC RBC AUTO-ENTMCNC: 35.5 G/DL (ref 31.5–36.5)
MCV RBC AUTO: 93 FL (ref 78–100)
MONOCYTES # BLD AUTO: 1 10E3/UL (ref 0–1.3)
MONOCYTES NFR BLD AUTO: 14 %
NEUTROPHILS # BLD AUTO: 4 10E3/UL (ref 1.6–8.3)
NEUTROPHILS NFR BLD AUTO: 54 %
NRBC # BLD AUTO: 0 10E3/UL
NRBC BLD AUTO-RTO: 0 /100
PLATELET # BLD AUTO: 157 10E3/UL (ref 150–450)
POTASSIUM SERPL-SCNC: 4.2 MMOL/L (ref 3.4–5.3)
PROT SERPL-MCNC: 6.9 G/DL (ref 6.4–8.3)
RBC # BLD AUTO: 3.51 10E6/UL (ref 4.4–5.9)
SODIUM SERPL-SCNC: 138 MMOL/L (ref 135–145)
WBC # BLD AUTO: 7.4 10E3/UL (ref 4–11)

## 2024-11-12 PROCEDURE — 85004 AUTOMATED DIFF WBC COUNT: CPT | Mod: ZL | Performed by: INTERNAL MEDICINE

## 2024-11-12 PROCEDURE — A4222 INFUSION SUPPLIES WITH PUMP: HCPCS

## 2024-11-12 PROCEDURE — 82378 CARCINOEMBRYONIC ANTIGEN: CPT | Mod: ZL

## 2024-11-12 PROCEDURE — S9330 HIT CONT CHEM DIEM: HCPCS

## 2024-11-12 PROCEDURE — 36415 COLL VENOUS BLD VENIPUNCTURE: CPT

## 2024-11-12 PROCEDURE — 84155 ASSAY OF PROTEIN SERUM: CPT | Mod: ZL | Performed by: INTERNAL MEDICINE

## 2024-11-12 PROCEDURE — 250N000011 HC RX IP 250 OP 636: Mod: JW | Performed by: INTERNAL MEDICINE

## 2024-11-12 PROCEDURE — G0463 HOSPITAL OUTPT CLINIC VISIT: HCPCS

## 2024-11-12 PROCEDURE — 258N000003 HC RX IP 258 OP 636: Performed by: INTERNAL MEDICINE

## 2024-11-12 RX ORDER — FLUOROURACIL 50 MG/ML
400 INJECTION, SOLUTION INTRAVENOUS ONCE
Status: CANCELLED | OUTPATIENT
Start: 2024-11-12

## 2024-11-12 RX ORDER — MEPERIDINE HYDROCHLORIDE 25 MG/ML
25 INJECTION INTRAMUSCULAR; INTRAVENOUS; SUBCUTANEOUS EVERY 30 MIN PRN
OUTPATIENT
Start: 2024-12-10

## 2024-11-12 RX ORDER — EPINEPHRINE 1 MG/ML
0.3 INJECTION, SOLUTION, CONCENTRATE INTRAVENOUS EVERY 5 MIN PRN
OUTPATIENT
Start: 2024-12-10

## 2024-11-12 RX ORDER — LORAZEPAM 2 MG/ML
0.5 INJECTION INTRAMUSCULAR EVERY 4 HOURS PRN
OUTPATIENT
Start: 2024-12-10

## 2024-11-12 RX ORDER — HEPARIN SODIUM (PORCINE) LOCK FLUSH IV SOLN 100 UNIT/ML 100 UNIT/ML
5 SOLUTION INTRAVENOUS
OUTPATIENT
Start: 2024-11-14

## 2024-11-12 RX ORDER — ONDANSETRON 2 MG/ML
8 INJECTION INTRAMUSCULAR; INTRAVENOUS ONCE
OUTPATIENT
Start: 2024-11-26

## 2024-11-12 RX ORDER — HEPARIN SODIUM (PORCINE) LOCK FLUSH IV SOLN 100 UNIT/ML 100 UNIT/ML
5 SOLUTION INTRAVENOUS
OUTPATIENT
Start: 2024-11-28

## 2024-11-12 RX ORDER — HEPARIN SODIUM (PORCINE) LOCK FLUSH IV SOLN 100 UNIT/ML 100 UNIT/ML
5 SOLUTION INTRAVENOUS
OUTPATIENT
Start: 2024-12-12

## 2024-11-12 RX ORDER — DIPHENHYDRAMINE HYDROCHLORIDE 50 MG/ML
50 INJECTION INTRAMUSCULAR; INTRAVENOUS
Status: CANCELLED
Start: 2024-11-12

## 2024-11-12 RX ORDER — MEPERIDINE HYDROCHLORIDE 25 MG/ML
25 INJECTION INTRAMUSCULAR; INTRAVENOUS; SUBCUTANEOUS EVERY 30 MIN PRN
Status: CANCELLED | OUTPATIENT
Start: 2024-11-12

## 2024-11-12 RX ORDER — DIPHENHYDRAMINE HYDROCHLORIDE 50 MG/ML
50 INJECTION INTRAMUSCULAR; INTRAVENOUS
Start: 2024-11-26

## 2024-11-12 RX ORDER — METHYLPREDNISOLONE SODIUM SUCCINATE 125 MG/2ML
125 INJECTION INTRAMUSCULAR; INTRAVENOUS
Start: 2024-11-26

## 2024-11-12 RX ORDER — ALBUTEROL SULFATE 0.83 MG/ML
2.5 SOLUTION RESPIRATORY (INHALATION)
OUTPATIENT
Start: 2024-12-10

## 2024-11-12 RX ORDER — FLUOROURACIL 50 MG/ML
400 INJECTION, SOLUTION INTRAVENOUS ONCE
Status: COMPLETED | OUTPATIENT
Start: 2024-11-12 | End: 2024-11-12

## 2024-11-12 RX ORDER — ONDANSETRON 2 MG/ML
8 INJECTION INTRAMUSCULAR; INTRAVENOUS ONCE
Status: COMPLETED | OUTPATIENT
Start: 2024-11-12 | End: 2024-11-12

## 2024-11-12 RX ORDER — HEPARIN SODIUM (PORCINE) LOCK FLUSH IV SOLN 100 UNIT/ML 100 UNIT/ML
5 SOLUTION INTRAVENOUS
OUTPATIENT
Start: 2024-12-10

## 2024-11-12 RX ORDER — ALBUTEROL SULFATE 0.83 MG/ML
2.5 SOLUTION RESPIRATORY (INHALATION)
Status: CANCELLED | OUTPATIENT
Start: 2024-11-12

## 2024-11-12 RX ORDER — ALBUTEROL SULFATE 90 UG/1
1-2 INHALANT RESPIRATORY (INHALATION)
Start: 2024-12-10

## 2024-11-12 RX ORDER — HEPARIN SODIUM,PORCINE 10 UNIT/ML
5-20 VIAL (ML) INTRAVENOUS DAILY PRN
OUTPATIENT
Start: 2024-11-28

## 2024-11-12 RX ORDER — MEPERIDINE HYDROCHLORIDE 25 MG/ML
25 INJECTION INTRAMUSCULAR; INTRAVENOUS; SUBCUTANEOUS EVERY 30 MIN PRN
OUTPATIENT
Start: 2024-11-26

## 2024-11-12 RX ORDER — ALBUTEROL SULFATE 90 UG/1
1-2 INHALANT RESPIRATORY (INHALATION)
Start: 2024-11-26

## 2024-11-12 RX ORDER — LORAZEPAM 2 MG/ML
0.5 INJECTION INTRAMUSCULAR EVERY 4 HOURS PRN
OUTPATIENT
Start: 2024-11-26

## 2024-11-12 RX ORDER — HEPARIN SODIUM (PORCINE) LOCK FLUSH IV SOLN 100 UNIT/ML 100 UNIT/ML
5 SOLUTION INTRAVENOUS
OUTPATIENT
Start: 2024-11-26

## 2024-11-12 RX ORDER — HEPARIN SODIUM,PORCINE 10 UNIT/ML
5-20 VIAL (ML) INTRAVENOUS DAILY PRN
OUTPATIENT
Start: 2024-12-10

## 2024-11-12 RX ORDER — HEPARIN SODIUM,PORCINE 10 UNIT/ML
5-20 VIAL (ML) INTRAVENOUS DAILY PRN
Status: CANCELLED | OUTPATIENT
Start: 2024-11-12

## 2024-11-12 RX ORDER — FLUOROURACIL 50 MG/ML
400 INJECTION, SOLUTION INTRAVENOUS ONCE
OUTPATIENT
Start: 2024-11-26

## 2024-11-12 RX ORDER — HEPARIN SODIUM (PORCINE) LOCK FLUSH IV SOLN 100 UNIT/ML 100 UNIT/ML
5 SOLUTION INTRAVENOUS
Status: CANCELLED | OUTPATIENT
Start: 2024-11-12

## 2024-11-12 RX ORDER — HEPARIN SODIUM,PORCINE 10 UNIT/ML
5-20 VIAL (ML) INTRAVENOUS DAILY PRN
OUTPATIENT
Start: 2024-12-12

## 2024-11-12 RX ORDER — ONDANSETRON 2 MG/ML
8 INJECTION INTRAMUSCULAR; INTRAVENOUS ONCE
OUTPATIENT
Start: 2024-12-10

## 2024-11-12 RX ORDER — HEPARIN SODIUM,PORCINE 10 UNIT/ML
5-20 VIAL (ML) INTRAVENOUS DAILY PRN
OUTPATIENT
Start: 2024-11-14

## 2024-11-12 RX ORDER — DIPHENHYDRAMINE HYDROCHLORIDE 50 MG/ML
50 INJECTION INTRAMUSCULAR; INTRAVENOUS
Start: 2024-12-10

## 2024-11-12 RX ORDER — FLUOROURACIL 50 MG/ML
400 INJECTION, SOLUTION INTRAVENOUS ONCE
OUTPATIENT
Start: 2024-12-10

## 2024-11-12 RX ORDER — METHYLPREDNISOLONE SODIUM SUCCINATE 125 MG/2ML
125 INJECTION INTRAMUSCULAR; INTRAVENOUS
Status: CANCELLED
Start: 2024-11-12

## 2024-11-12 RX ORDER — EPINEPHRINE 1 MG/ML
0.3 INJECTION, SOLUTION, CONCENTRATE INTRAVENOUS EVERY 5 MIN PRN
OUTPATIENT
Start: 2024-11-26

## 2024-11-12 RX ORDER — EPINEPHRINE 1 MG/ML
0.3 INJECTION, SOLUTION, CONCENTRATE INTRAVENOUS EVERY 5 MIN PRN
Status: CANCELLED | OUTPATIENT
Start: 2024-11-12

## 2024-11-12 RX ORDER — ALBUTEROL SULFATE 0.83 MG/ML
2.5 SOLUTION RESPIRATORY (INHALATION)
OUTPATIENT
Start: 2024-11-26

## 2024-11-12 RX ORDER — HEPARIN SODIUM,PORCINE 10 UNIT/ML
5-20 VIAL (ML) INTRAVENOUS DAILY PRN
OUTPATIENT
Start: 2024-11-26

## 2024-11-12 RX ORDER — ALBUTEROL SULFATE 90 UG/1
1-2 INHALANT RESPIRATORY (INHALATION)
Status: CANCELLED
Start: 2024-11-12

## 2024-11-12 RX ORDER — METHYLPREDNISOLONE SODIUM SUCCINATE 125 MG/2ML
125 INJECTION INTRAMUSCULAR; INTRAVENOUS
Start: 2024-12-10

## 2024-11-12 RX ORDER — LORAZEPAM 2 MG/ML
0.5 INJECTION INTRAMUSCULAR EVERY 4 HOURS PRN
Status: CANCELLED | OUTPATIENT
Start: 2024-11-12

## 2024-11-12 RX ADMIN — FLUOROURACIL 790 MG: 50 INJECTION, SOLUTION INTRAVENOUS at 13:15

## 2024-11-12 RX ADMIN — LEUCOVORIN CALCIUM 800 MG: 500 INJECTION, POWDER, LYOPHILIZED, FOR SOLUTION INTRAMUSCULAR; INTRAVENOUS at 11:05

## 2024-11-12 RX ADMIN — ONDANSETRON 8 MG: 2 INJECTION INTRAMUSCULAR; INTRAVENOUS at 10:03

## 2024-11-12 RX ADMIN — DEXTROSE MONOHYDRATE 250 ML: 50 INJECTION, SOLUTION INTRAVENOUS at 10:02

## 2024-11-12 RX ADMIN — OXALIPLATIN 130 MG: 5 INJECTION, SOLUTION INTRAVENOUS at 11:05

## 2024-11-12 RX ADMIN — FOSAPREPITANT: 150 INJECTION, POWDER, LYOPHILIZED, FOR SOLUTION INTRAVENOUS at 10:19

## 2024-11-12 ASSESSMENT — PAIN SCALES - GENERAL: PAINLEVEL_OUTOF10: NO PAIN (0)

## 2024-11-12 NOTE — PATIENT INSTRUCTIONS
We will see you back as planned. If you have any questions or concerns, we can be reached Monday through Friday 8am - 430pm at [phone] (PAC). If you have concerns related to a potential reaction/side effect after hours/weekends/holiday's, please seek emergent medical care.

## 2024-11-12 NOTE — NURSING NOTE
"Oncology Rooming Note    November 12, 2024 8:56 AM   Tal Mcbride is a 69 year old male who presents for:    Chief Complaint   Patient presents with    Oncology Clinic Visit     Follow up Rectal cancer      Initial Vitals: /70   Pulse 58   Temp 96.9  F (36.1  C) (Tympanic)   Resp 20   Ht 1.753 m (5' 9\")   Wt 80 kg (176 lb 5.9 oz)   SpO2 97%   BMI 26.05 kg/m   Estimated body mass index is 26.05 kg/m  as calculated from the following:    Height as of this encounter: 1.753 m (5' 9\").    Weight as of this encounter: 80 kg (176 lb 5.9 oz). Body surface area is 1.97 meters squared.  No Pain (0) Comment: Data Unavailable   No LMP for male patient.  Allergies reviewed: Yes  Medications reviewed: Yes    Medications: Medication refills not needed today.  Pharmacy name entered into [employer]: [facility] [id number]    Frailty Screening:   Is the patient here for a new oncology consult visit in cancer care? 2. No            Hermila Fletcher LPN             "

## 2024-11-12 NOTE — PROGRESS NOTES
Labs from port completed per facility policy.  Patient handed off to Medical oncology for rooming and provider visit.  Patient's questions answered to patient and spouse satisfaction.

## 2024-11-12 NOTE — PROGRESS NOTES
Infusion Nursing Note:  Tal Mcbride presents today for FOLFOX.    Patient seen by provider today: Yes: Dr Poon  Home medications, allergies, vitals, fall risk, pain and abuse screen done at [facility] ONC appt earlier this date. All reviewed by this nurse prior to treating. Patient denies questions or concerns not already discussed with provider prior, wishes to proceed with treatment.    present during visit today: Not Applicable.    Note: Message to ONC RNCC pool re need for coordination of pump off time in [employer] Thursday at/around 1130. Response from [facility] RN that patient has a standing appt in [employer] for pump off at 230pm 2 days after each treatment. No further action is required then by infusion nursing, so communication added to treatment plan alerting. Cancelled patients pump off appt with us on 11-14-24. Confirmed with patient date/time for [place].       Treatment Conditions:  Lab Results   Component Value Date    HGB 11.6 (L) 11/12/2024    WBC 7.4 11/12/2024    ANEUTAUTO 4.0 11/12/2024     11/12/2024        Lab Results   Component Value Date     11/12/2024    POTASSIUM 4.2 11/12/2024    CR 1.12 11/12/2024    RAUDEL 9.8 11/12/2024    BILITOTAL 0.4 11/12/2024    ALBUMIN 4.4 11/12/2024    ALT 34 11/12/2024    AST 30 11/12/2024       Results reviewed, labs MET treatment parameters, ok to proceed with treatment.      Post Infusion Assessment:  Patient tolerated infusion without incident.  Site patent and intact, free from redness, edema or discomfort.  No evidence of extravasations.       Discharge Plan:   Patient discharged in stable condition.  Departure Mode: Ambulatory.

## 2024-11-12 NOTE — PROGRESS NOTES
MEDICAL ONCOLOGY FOLLOW-UP NOTE  Nov 12, 2024    Reason for follow-up: Rectal cancer    HISTORY OF PRESENT ILLNESS  Tal Mcbride is a 69 year old male with PMH as stated below who is seen in the oncology clinic for rectal cancer.    His history in short is as follows:    Mr. Mcbride started having diarrhea in the end of March 2024.  He was evaluated by his primary care and recommended to undergo a colonoscopy and upper endoscopy.    4/29/2024: EGD: 3 cm hiatal hernia.  Normal stomach.  Normal third portion of the duodenum.  Nonobstructing Schatzki's ring.    4/29/2024: Sigmoidoscopy: The digital rectal exam revealed a soft tissue mass palpated 8 cm from the anal verge.  The mass was noncircumferential and located predominantly at the posterior bowel wall.  An infiltrative partially obstructing large mass was found in the rectum.  The mass was circumferential.  The mass measured 4 cm in length.  No bleeding was present.  This was biopsied.  A pediatric colonoscope was passed past the area but patient had a large quantity of stool present just above the stricture so colonoscopy was not able to be completed.    Biopsy of the rectal mass showed moderately to poorly differentiated adenocarcinoma.    5/2/2024 CT chest with contrast: No definite findings for metastatic disease in the chest. 2 small pulmonary nodules measuring up to 4 mm unchanged from prior study     5/2/2024: MRI pelvis with and without contrast: A carcinoma involving the lower, middle and upper rectum extending over a length of roughly 12 cm demonstrated.  This lesion involves the mesorectum with no significant invasion anterolaterally on the right at the level of the mid rectum.  At this site infiltrative neoplasm is contactless with the posterior margin of the right seminal vesicle but it is not clearly invaded.  Neoplasm extends roughly 1.5 cm beyond the rectal margin.  Numerous enlarged mesorectal lymph nodes are demonstrated.  The larger mesorectal  lymph node is demonstrated on the right measuring 1.8X 1.5 cm.  Additional mesorectal lymph nodes with short axis measurements at least 1 cm demonstrated.  Inferior mesenteric lymph nodes with short axis measurement of 1.1 cm X 1.0 cm are noted.  A left internal iliac node measuring 1.2X 0.8 cm is demonstrated.  A 2.7 X1.8 centimeter right iliac node is demonstrated.  The other visualized bowel is unremarkable.  The prostate is mildly enlarged.  No free fluid is demonstrated.    5/13/2024: CT abdomen and pelvis without contrast: Large infiltrative rectal mass involving the upper middle and lower rectum.  There is surrounding inflammatory change and pelvic free fluid.  There is adjacent mesorectal adenopathy, a left internal iliac node as well.  Large amount of stool throughout the colon.  Enlarged prostate gland.  Left probable UPJ obstruction.    6/6/2024: Diverting colostomy    7/24/2024: Cycle 1 of FOLFOX    7/25/2024: PET scan: Masslike thickening with associated FDG avidity throughout the rectum is consistent with given history.  Metastatic disease within the liver, retroperitoneal nodes, perirectal nodes and inguinal nodes.  Findings are concerning for a UPJ obstruction.  Nuclear medicine renal Lasix scan for further characterize.    10/11/2024: CT chest, abdomen and pelvis: IMPRESSION: There is a new area of abnormal low density in the liver  suspicious for metastatic disease. There has been decrease in size in  a liver nodule seen in the medial segment of the left lobe from  previous examination. Other liver lesions seen on the PET CT scan were  not resolved by CT scanning. A left inguinal lymph node with abnormal  hypermetabolism on the PET CT scan has decreased in size as compared  to previous PET/CT examination.    Interim history:    Mr. Mcbride is doing well today.  He denies any worsening nausea or diarrhea.Denies abdominal pain. Neuropathy at baseline. Ostomy is functioning.     REVIEW OF SYSTEMS  A  12-point ROS negative except as in HPI      Current Outpatient Medications   Medication Sig Dispense Refill    acetaminophen (TYLENOL) 500 MG tablet Take 650 mg by mouth every 6 hours as needed for mild pain.      aspirin 81 MG EC tablet Take 81 mg by mouth daily      atorvastatin (LIPITOR) 20 MG tablet Take 20 mg by mouth daily      dexAMETHasone (DECADRON) 4 MG tablet Take 2 tablets (8 mg) by mouth daily. Take for 2 days, starting the day after chemo. Take with food. 20 tablet 2    Fluorouracil (ADRUCIL) 4,750 mg in sodium chloride 0.9 % 230 mL via CADD pump Infuse 4,750 mg at 5 mL/hr over 46 hours into the vein once for 1 dose. 269826 mL 0    hydrochlorothiazide (HYDRODIURIL) 25 MG tablet Take 25 mg by mouth daily      lidocaine-prilocaine (EMLA) 2.5-2.5 % external cream Apply topically daily as needed for moderate pain 30 g 1    lisinopril (ZESTRIL) 40 MG tablet Take 40 mg by mouth daily      metFORMIN (GLUCOPHAGE-XR) 750 MG 24 hr tablet Take 750 mg by mouth 2 times daily (with meals)      multivitamin w/minerals (MULTI-VITAMIN) tablet Take 1 tablet by mouth daily      Polyethylene Glycol 3350 (MIRALAX PO) Take 1 Capful by mouth daily      loperamide (IMODIUM A-D) 2 MG tablet Take 1 tablet (2 mg) by mouth 4 times daily as needed for diarrhea (Patient not taking: Reported on 11/12/2024) 90 tablet 0    ondansetron (ZOFRAN) 8 MG tablet Take 1 tablet (8 mg) by mouth every 8 hours as needed for nausea (vomiting) (Patient not taking: Reported on 11/12/2024) 30 tablet 2    Oxymetazoline HCl (NASAL DECONGESTANT SPRAY NA) Spray in nostril as needed. (Patient not taking: Reported on 11/12/2024)      prochlorperazine (COMPAZINE) 5 MG tablet Take 1 tablet (5 mg) by mouth every 6 hours as needed for nausea or vomiting (Patient not taking: Reported on 11/12/2024) 90 tablet 0    traMADol (ULTRAM) 50 MG tablet Take 1 tablet (50 mg) by mouth every 6 hours as needed for severe pain (Patient not taking: Reported on 11/12/2024) 40  tablet 0    traZODone (DESYREL) 100 MG tablet Take 100 mg by mouth at bedtime. (Patient not taking: Reported on 11/12/2024)         No Known Allergies  Immunization History   Administered Date(s) Administered    COVID-19 Bivalent 12+ (Pfizer) 10/27/2022    COVID-19 Monovalent 18+ (Moderna) 01/16/2021, 02/13/2021, 11/29/2021    Hepatitis B, Adult 10/03/2013, 03/28/2014, 12/30/2014    Influenza Vaccine (Flucelvax Quadrivalent) 10/07/2022    Pneumococcal 23 valent 10/03/2013    TDAP (Adacel,Boostrix) 10/03/2012       Past Medical History:   Diagnosis Date    Adolescent idiopathic scoliosis of thoracolumbar region 11/16/2017    Bunion 03/28/2013    Essential hypertension 03/05/2012    Formatting of this note might be different from the original.   Epic      Pure hypercholesterolemia 10/04/2012    Rectal cancer (H) 05/29/2024    Scoliosis     Severe protein-calorie malnutrition (H) 06/07/2024    Type 2 diabetes mellitus without complication (H) 06/21/2012       Past Surgical History:   Procedure Laterality Date    CATARACT EXTRACTION W/ INTRAOCULAR LENS IMPLANT Left 2021    ENDOSCOPY N/A     upper and lower    GI SURGERY      PHACOEMULSIFICATION WITH STANDARD INTRAOCULAR LENS IMPLANT Right 09/29/2021    Procedure: right Cataract Extraction with Implant;  Surgeon: Juan Prado MD;  Location: [facility] OR    robotic colostomy N/A 06/06/2024       SOCIAL HISTORY  History   Smoking Status    Former    Types: Cigarettes   Smokeless Tobacco    Former    Social History    Substance and Sexual Activity      Alcohol use: Not Currently     History   Drug Use Unknown       FAMILY HISTORY  Family History   Problem Relation Age of Onset    Hypertension Mother     Pancreatic Cancer Father 55    Hypertension Sister     Other Problems  (knee replacement) Sister         bilateal knee replacement    Other Problems  (hip replacement) Sister         bilateral hip replacement    Diabetes No family hx of     Coronary Artery Disease No  "family hx of     Hyperlipidemia No family hx of     Cerebrovascular Disease No family hx of     Breast Cancer No family hx of     Colon Cancer No family hx of     Prostate Cancer No family hx of     Anesthesia Reaction No family hx of     Asthma No family hx of     Genetic Disorder No family hx of     Thyroid Disease No family hx of        PHYSICAL EXAMINATION  /70   Pulse 58   Temp 96.9  F (36.1  C) (Tympanic)   Resp 20   Ht 1.753 m (5' 9\")   Wt 80 kg (176 lb 5.9 oz)   SpO2 97%   BMI 26.05 kg/m    Wt Readings from Last 2 Encounters:   11/12/24 80 kg (176 lb 5.9 oz)   10/29/24 81.1 kg (178 lb 12.7 oz)     Physical Exam  Constitutional:       Appearance: Normal appearance.   Abdominal:      General: Abdomen is flat.   Neurological:      General: No focal deficit present.      Mental Status: He is alert and oriented to person, place, and time.   Psychiatric:         Mood and Affect: Mood normal.     Laboratory and imaging:     Latest Reference Range & Units 11/12/24 08:27   WBC 4.0 - 11.0 10e3/uL 7.4   Hemoglobin 13.3 - 17.7 g/dL 11.6 (L)   Hematocrit 40.0 - 53.0 % 32.7 (L)   Platelet Count 150 - 450 10e3/uL 157   (L): Data is abnormally low      Foundation one: no reportable RNA variants EGFR amplification KRAS wildtype NRAS wildtype APC * ARIDIA * TP53 splice site [id number]>G Diseas  e relevant genes with no reportable alterations: BRAF, ERBB2, KRAS, NRAS.    ASSESSMENT AND PLAN    1.  Stage IV rectal cancer:    Initially presented with diarrhea in March 2024.  Sigmoidoscopy showed rectal mass infiltrative partially obstructing large mass 8 cm from the anal verge.  MRI pelvis showed carcinoma involving lower, middle and upper rectum extending over a length of roughly 12 cm.  Lesion involves the mesorectum but most significant invasion anterolaterally on the right at the level of the mid rectum.  Neoplasm is contiguous with the posterior margin of the right seminal vesicle but is not clearly " invading it.  Extends roughly 1.5 cm beyond the rectal margin.  It also showed numerous enlarged mesorectal lymph nodes.  A inferior mesenteric lymph node was also noted.  There were also enlarged left iliac nodes and right iliac node.    He has already had a diverting ostomy by  on 6/6/2024.    He was then found to have metastatic disease on PET scan.   PET scan showed 5 foci of abnormal avidity present within the liver, involvement of the common iliac lymph node, upper abdominal para-aortic lymph node, right common iliac node and left perirectal node.  A few inguinal nodes are also avid.    Currently on palliative chemotherapy with FOLFOX which he started on 7/24/2024. He is status post cycle 8.  He is symptomatically doing much better. His staging CT scans showed new possible liver met but overall response. His CEA has also decreased. For now I would recommend we continue treatment. Given the neuropathy we can dose reduce oxaliplatin to 65 mg/m2. He is scheduled for a PET scan in December.     Discussed now plan will be to continue upto 12 cycles of FOLFOX. He is having issues with the pump and once he finishes 12 cycles we can consider moving to single agent Capecitabine for maintenance. We can also add bevacizumab. He did meet with his surgeon and had a MRI pelvis done at [facility] which showed response. However plan is to hold off on ostomy reversal for now.     Will see him back after PET scan on 12/18.    Shawna Poon MD

## 2024-11-13 LAB — CEA SERPL-MCNC: 5 NG/ML

## 2024-11-13 PROCEDURE — S9330 HIT CONT CHEM DIEM: HCPCS

## 2024-11-13 PROCEDURE — A4222 INFUSION SUPPLIES WITH PUMP: HCPCS

## 2024-11-14 PROCEDURE — A4222 INFUSION SUPPLIES WITH PUMP: HCPCS

## 2024-11-14 PROCEDURE — S9330 HIT CONT CHEM DIEM: HCPCS

## 2024-11-15 PROCEDURE — S9330 HIT CONT CHEM DIEM: HCPCS

## 2024-11-16 PROCEDURE — S9330 HIT CONT CHEM DIEM: HCPCS

## 2024-11-17 PROCEDURE — S9330 HIT CONT CHEM DIEM: HCPCS

## 2024-11-18 PROCEDURE — S9330 HIT CONT CHEM DIEM: HCPCS

## 2024-11-19 PROCEDURE — S9330 HIT CONT CHEM DIEM: HCPCS

## 2024-11-20 PROCEDURE — S9330 HIT CONT CHEM DIEM: HCPCS

## 2024-11-21 PROCEDURE — S9330 HIT CONT CHEM DIEM: HCPCS

## 2024-11-22 ENCOUNTER — HOME INFUSION BILLING (OUTPATIENT)
Dept: HOME HEALTH SERVICES | Facility: HOME HEALTH | Age: 69
End: 2024-11-22
Payer: COMMERCIAL

## 2024-11-22 PROCEDURE — E1399 DURABLE MEDICAL EQUIPMENT MI: HCPCS

## 2024-11-22 PROCEDURE — S9330 HIT CONT CHEM DIEM: HCPCS

## 2024-11-23 PROCEDURE — S9330 HIT CONT CHEM DIEM: HCPCS

## 2024-11-24 PROCEDURE — S9330 HIT CONT CHEM DIEM: HCPCS

## 2024-11-25 PROCEDURE — S9330 HIT CONT CHEM DIEM: HCPCS

## 2024-11-26 PROCEDURE — S9330 HIT CONT CHEM DIEM: HCPCS

## 2024-11-26 PROCEDURE — A4221 SUPP NON-INSULIN INF CATH/WK: HCPCS

## 2024-11-26 PROCEDURE — A4222 INFUSION SUPPLIES WITH PUMP: HCPCS

## 2024-11-27 ENCOUNTER — INFUSION THERAPY VISIT (OUTPATIENT)
Dept: INFUSION THERAPY | Facility: OTHER | Age: 69
End: 2024-11-27
Attending: INTERNAL MEDICINE
Payer: COMMERCIAL

## 2024-11-27 VITALS
OXYGEN SATURATION: 94 % | WEIGHT: 180.34 LBS | BODY MASS INDEX: 26.63 KG/M2 | TEMPERATURE: 97.7 F | DIASTOLIC BLOOD PRESSURE: 67 MMHG | HEART RATE: 61 BPM | SYSTOLIC BLOOD PRESSURE: 125 MMHG

## 2024-11-27 DIAGNOSIS — C20 RECTAL CANCER (H): Primary | ICD-10-CM

## 2024-11-27 LAB
ALBUMIN SERPL BCG-MCNC: 4.2 G/DL (ref 3.5–5.2)
ALP SERPL-CCNC: 80 U/L (ref 40–150)
ALT SERPL W P-5'-P-CCNC: 39 U/L (ref 0–70)
ANION GAP SERPL CALCULATED.3IONS-SCNC: 12 MMOL/L (ref 7–15)
AST SERPL W P-5'-P-CCNC: 35 U/L (ref 0–45)
BASOPHILS # BLD AUTO: 0.1 10E3/UL (ref 0–0.2)
BASOPHILS NFR BLD AUTO: 1 %
BILIRUB SERPL-MCNC: 0.5 MG/DL
BUN SERPL-MCNC: 16.7 MG/DL (ref 8–23)
CALCIUM SERPL-MCNC: 9.3 MG/DL (ref 8.8–10.4)
CEA SERPL-MCNC: 4.9 NG/ML
CHLORIDE SERPL-SCNC: 102 MMOL/L (ref 98–107)
CREAT SERPL-MCNC: 1.11 MG/DL (ref 0.67–1.17)
EGFRCR SERPLBLD CKD-EPI 2021: 72 ML/MIN/1.73M2
EOSINOPHIL # BLD AUTO: 0.2 10E3/UL (ref 0–0.7)
EOSINOPHIL NFR BLD AUTO: 2 %
ERYTHROCYTE [DISTWIDTH] IN BLOOD BY AUTOMATED COUNT: 14.6 % (ref 10–15)
GLUCOSE SERPL-MCNC: 155 MG/DL (ref 70–99)
HCO3 SERPL-SCNC: 22 MMOL/L (ref 22–29)
HCT VFR BLD AUTO: 32.4 % (ref 40–53)
HGB BLD-MCNC: 11.1 G/DL (ref 13.3–17.7)
IMM GRANULOCYTES # BLD: 0 10E3/UL
IMM GRANULOCYTES NFR BLD: 0 %
LYMPHOCYTES # BLD AUTO: 1.9 10E3/UL (ref 0.8–5.3)
LYMPHOCYTES NFR BLD AUTO: 27 %
MCH RBC QN AUTO: 32.9 PG (ref 26.5–33)
MCHC RBC AUTO-ENTMCNC: 34.3 G/DL (ref 31.5–36.5)
MCV RBC AUTO: 96 FL (ref 78–100)
MONOCYTES # BLD AUTO: 0.9 10E3/UL (ref 0–1.3)
MONOCYTES NFR BLD AUTO: 13 %
NEUTROPHILS # BLD AUTO: 3.9 10E3/UL (ref 1.6–8.3)
NEUTROPHILS NFR BLD AUTO: 57 %
NRBC # BLD AUTO: 0 10E3/UL
NRBC BLD AUTO-RTO: 0 /100
PLATELET # BLD AUTO: 127 10E3/UL (ref 150–450)
POTASSIUM SERPL-SCNC: 4 MMOL/L (ref 3.4–5.3)
PROT SERPL-MCNC: 6.3 G/DL (ref 6.4–8.3)
RBC # BLD AUTO: 3.37 10E6/UL (ref 4.4–5.9)
SODIUM SERPL-SCNC: 136 MMOL/L (ref 135–145)
WBC # BLD AUTO: 6.8 10E3/UL (ref 4–11)

## 2024-11-27 PROCEDURE — A4222 INFUSION SUPPLIES WITH PUMP: HCPCS

## 2024-11-27 PROCEDURE — 82310 ASSAY OF CALCIUM: CPT | Mod: ZL | Performed by: INTERNAL MEDICINE

## 2024-11-27 PROCEDURE — 82040 ASSAY OF SERUM ALBUMIN: CPT | Mod: ZL | Performed by: INTERNAL MEDICINE

## 2024-11-27 PROCEDURE — 82378 CARCINOEMBRYONIC ANTIGEN: CPT | Mod: ZL

## 2024-11-27 PROCEDURE — S9330 HIT CONT CHEM DIEM: HCPCS

## 2024-11-27 PROCEDURE — 85004 AUTOMATED DIFF WBC COUNT: CPT | Mod: ZL | Performed by: INTERNAL MEDICINE

## 2024-11-27 PROCEDURE — 36415 COLL VENOUS BLD VENIPUNCTURE: CPT

## 2024-11-27 PROCEDURE — 82247 BILIRUBIN TOTAL: CPT | Mod: ZL | Performed by: INTERNAL MEDICINE

## 2024-11-27 PROCEDURE — 258N000003 HC RX IP 258 OP 636: Performed by: INTERNAL MEDICINE

## 2024-11-27 PROCEDURE — 250N000011 HC RX IP 250 OP 636: Mod: JZ | Performed by: INTERNAL MEDICINE

## 2024-11-27 PROCEDURE — 85041 AUTOMATED RBC COUNT: CPT | Mod: ZL | Performed by: INTERNAL MEDICINE

## 2024-11-27 PROCEDURE — 82947 ASSAY GLUCOSE BLOOD QUANT: CPT | Mod: ZL | Performed by: INTERNAL MEDICINE

## 2024-11-27 RX ORDER — FLUOROURACIL 50 MG/ML
400 INJECTION, SOLUTION INTRAVENOUS ONCE
Status: COMPLETED | OUTPATIENT
Start: 2024-11-27 | End: 2024-11-27

## 2024-11-27 RX ORDER — ONDANSETRON 2 MG/ML
8 INJECTION INTRAMUSCULAR; INTRAVENOUS ONCE
Status: COMPLETED | OUTPATIENT
Start: 2024-11-27 | End: 2024-11-27

## 2024-11-27 RX ORDER — GLIPIZIDE 2.5 MG/1
2.5 TABLET, EXTENDED RELEASE ORAL DAILY
COMMUNITY
Start: 2024-11-22

## 2024-11-27 RX ORDER — HEPARIN SODIUM (PORCINE) LOCK FLUSH IV SOLN 100 UNIT/ML 100 UNIT/ML
5 SOLUTION INTRAVENOUS
Status: DISCONTINUED | OUTPATIENT
Start: 2024-11-27 | End: 2024-11-27 | Stop reason: HOSPADM

## 2024-11-27 RX ADMIN — FOSAPREPITANT: 150 INJECTION, POWDER, LYOPHILIZED, FOR SOLUTION INTRAVENOUS at 09:50

## 2024-11-27 RX ADMIN — FLUOROURACIL 790 MG: 50 INJECTION, SOLUTION INTRAVENOUS at 13:01

## 2024-11-27 RX ADMIN — OXALIPLATIN 130 MG: 5 INJECTION, SOLUTION INTRAVENOUS at 10:42

## 2024-11-27 RX ADMIN — LEUCOVORIN CALCIUM 800 MG: 500 INJECTION, POWDER, LYOPHILIZED, FOR SOLUTION INTRAMUSCULAR; INTRAVENOUS at 10:32

## 2024-11-27 RX ADMIN — DEXTROSE MONOHYDRATE 250 ML: 50 INJECTION, SOLUTION INTRAVENOUS at 10:31

## 2024-11-27 RX ADMIN — ONDANSETRON 8 MG: 2 INJECTION INTRAMUSCULAR; INTRAVENOUS at 09:47

## 2024-11-27 NOTE — PROGRESS NOTES
"Infusion Nursing Note:  Tal Mcbride presents today for Folfox.    Patient seen by provider today: No   present during visit today: Not Applicable.    Note: Prior to discharge: Port is secured in place with tegaderm and flushed with 10cc NS with positive blood return noted.  Continuous home infusion CADD pump connected.    All connectors secured in place and clamps taped open.    Pump started, \"running\" noted on display (CADD): YES.  Pump Connection double checked with Queta MULLINS  Patient instructed to call our clinic or [facility] with any questions or concerns at home.  Patient verbalized understanding.    Patient set up for pump disconnect at [facility] on 11/29/24         Intravenous Access:  Implanted Port.    Treatment Conditions:  Lab Results   Component Value Date    HGB 11.1 (L) 11/27/2024    WBC 6.8 11/27/2024    ANEUTAUTO 3.9 11/27/2024     (L) 11/27/2024        Lab Results   Component Value Date     11/27/2024    POTASSIUM 4.0 11/27/2024    CR 1.11 11/27/2024    RAUDEL 9.3 11/27/2024    BILITOTAL 0.5 11/27/2024    ALBUMIN 4.2 11/27/2024    ALT 39 11/27/2024    AST 35 11/27/2024       Results reviewed, labs MET treatment parameters, ok to proceed with treatment.      Post Infusion Assessment:  Patient tolerated infusion without incident.  Blood return noted pre and post infusion.  Blood return noted during administration every 2 cc.  No evidence of extravasations.       Discharge Plan:   Discharge instructions reviewed with: Patient.  Patient and/or family verbalized understanding of discharge instructions and all questions answered.  Patient discharged in stable condition accompanied by: friend.  Departure Mode: Ambulatory.      Feli Amaya RN   "

## 2024-12-04 ENCOUNTER — HOME INFUSION (OUTPATIENT)
Dept: HOME HEALTH SERVICES | Facility: HOME HEALTH | Age: 69
End: 2024-12-04
Payer: COMMERCIAL

## 2024-12-06 ENCOUNTER — HOME INFUSION BILLING (OUTPATIENT)
Dept: HOME HEALTH SERVICES | Facility: HOME HEALTH | Age: 69
End: 2024-12-06
Payer: COMMERCIAL

## 2024-12-08 ENCOUNTER — HEALTH MAINTENANCE LETTER (OUTPATIENT)
Age: 69
End: 2024-12-08

## 2024-12-10 ENCOUNTER — INFUSION THERAPY VISIT (OUTPATIENT)
Dept: INFUSION THERAPY | Facility: OTHER | Age: 69
End: 2024-12-10
Attending: INTERNAL MEDICINE
Payer: COMMERCIAL

## 2024-12-10 VITALS
DIASTOLIC BLOOD PRESSURE: 75 MMHG | WEIGHT: 180.1 LBS | TEMPERATURE: 98.2 F | BODY MASS INDEX: 26.6 KG/M2 | OXYGEN SATURATION: 98 % | RESPIRATION RATE: 17 BRPM | SYSTOLIC BLOOD PRESSURE: 138 MMHG | HEART RATE: 58 BPM

## 2024-12-10 DIAGNOSIS — C20 RECTAL CANCER (H): Primary | ICD-10-CM

## 2024-12-10 LAB
ALBUMIN SERPL BCG-MCNC: 4.3 G/DL (ref 3.5–5.2)
ALP SERPL-CCNC: 78 U/L (ref 40–150)
ALT SERPL W P-5'-P-CCNC: 31 U/L (ref 0–70)
ANION GAP SERPL CALCULATED.3IONS-SCNC: 10 MMOL/L (ref 7–15)
AST SERPL W P-5'-P-CCNC: 32 U/L (ref 0–45)
BASOPHILS # BLD AUTO: 0.1 10E3/UL (ref 0–0.2)
BASOPHILS NFR BLD AUTO: 1 %
BILIRUB SERPL-MCNC: 0.4 MG/DL
BUN SERPL-MCNC: 21.4 MG/DL (ref 8–23)
CALCIUM SERPL-MCNC: 9.7 MG/DL (ref 8.8–10.4)
CEA SERPL-MCNC: 4.7 NG/ML
CHLORIDE SERPL-SCNC: 105 MMOL/L (ref 98–107)
CREAT SERPL-MCNC: 1.09 MG/DL (ref 0.67–1.17)
EGFRCR SERPLBLD CKD-EPI 2021: 73 ML/MIN/1.73M2
EOSINOPHIL # BLD AUTO: 0.1 10E3/UL (ref 0–0.7)
EOSINOPHIL NFR BLD AUTO: 2 %
ERYTHROCYTE [DISTWIDTH] IN BLOOD BY AUTOMATED COUNT: 14.2 % (ref 10–15)
GLUCOSE SERPL-MCNC: 206 MG/DL (ref 70–99)
HCO3 SERPL-SCNC: 24 MMOL/L (ref 22–29)
HCT VFR BLD AUTO: 33.1 % (ref 40–53)
HGB BLD-MCNC: 11.3 G/DL (ref 13.3–17.7)
IMM GRANULOCYTES # BLD: 0 10E3/UL
IMM GRANULOCYTES NFR BLD: 1 %
LYMPHOCYTES # BLD AUTO: 1.6 10E3/UL (ref 0.8–5.3)
LYMPHOCYTES NFR BLD AUTO: 24 %
MCH RBC QN AUTO: 33.2 PG (ref 26.5–33)
MCHC RBC AUTO-ENTMCNC: 34.1 G/DL (ref 31.5–36.5)
MCV RBC AUTO: 97 FL (ref 78–100)
MONOCYTES # BLD AUTO: 0.7 10E3/UL (ref 0–1.3)
MONOCYTES NFR BLD AUTO: 11 %
NEUTROPHILS # BLD AUTO: 4.1 10E3/UL (ref 1.6–8.3)
NEUTROPHILS NFR BLD AUTO: 62 %
NRBC # BLD AUTO: 0 10E3/UL
NRBC BLD AUTO-RTO: 0 /100
PLATELET # BLD AUTO: 146 10E3/UL (ref 150–450)
POTASSIUM SERPL-SCNC: 4 MMOL/L (ref 3.4–5.3)
PROT SERPL-MCNC: 6.8 G/DL (ref 6.4–8.3)
RBC # BLD AUTO: 3.4 10E6/UL (ref 4.4–5.9)
SODIUM SERPL-SCNC: 139 MMOL/L (ref 135–145)
WBC # BLD AUTO: 6.5 10E3/UL (ref 4–11)

## 2024-12-10 PROCEDURE — 250N000011 HC RX IP 250 OP 636: Mod: JW | Performed by: INTERNAL MEDICINE

## 2024-12-10 PROCEDURE — 84460 ALANINE AMINO (ALT) (SGPT): CPT | Mod: ZL | Performed by: INTERNAL MEDICINE

## 2024-12-10 PROCEDURE — 258N000003 HC RX IP 258 OP 636: Performed by: INTERNAL MEDICINE

## 2024-12-10 PROCEDURE — 82378 CARCINOEMBRYONIC ANTIGEN: CPT | Mod: ZL

## 2024-12-10 PROCEDURE — 82247 BILIRUBIN TOTAL: CPT | Mod: ZL | Performed by: INTERNAL MEDICINE

## 2024-12-10 PROCEDURE — 36591 DRAW BLOOD OFF VENOUS DEVICE: CPT

## 2024-12-10 PROCEDURE — 85004 AUTOMATED DIFF WBC COUNT: CPT | Mod: ZL | Performed by: INTERNAL MEDICINE

## 2024-12-10 PROCEDURE — 85018 HEMOGLOBIN: CPT | Mod: ZL | Performed by: INTERNAL MEDICINE

## 2024-12-10 RX ORDER — FLUOROURACIL 50 MG/ML
400 INJECTION, SOLUTION INTRAVENOUS ONCE
Status: COMPLETED | OUTPATIENT
Start: 2024-12-10 | End: 2024-12-10

## 2024-12-10 RX ORDER — ONDANSETRON 2 MG/ML
8 INJECTION INTRAMUSCULAR; INTRAVENOUS ONCE
Status: COMPLETED | OUTPATIENT
Start: 2024-12-10 | End: 2024-12-10

## 2024-12-10 RX ADMIN — LEUCOVORIN CALCIUM 800 MG: 500 INJECTION, POWDER, LYOPHILIZED, FOR SOLUTION INTRAMUSCULAR; INTRAVENOUS at 10:20

## 2024-12-10 RX ADMIN — OXALIPLATIN 130 MG: 5 INJECTION, SOLUTION INTRAVENOUS at 10:22

## 2024-12-10 RX ADMIN — ONDANSETRON 8 MG: 2 INJECTION INTRAMUSCULAR; INTRAVENOUS at 09:37

## 2024-12-10 RX ADMIN — FOSAPREPITANT: 150 INJECTION, POWDER, LYOPHILIZED, FOR SOLUTION INTRAVENOUS at 09:39

## 2024-12-10 RX ADMIN — FLUOROURACIL 790 MG: 50 INJECTION, SOLUTION INTRAVENOUS at 12:26

## 2024-12-10 NOTE — PROGRESS NOTES
"Infusion Nursing Note:  Tal Mcbride presents today for folfox.    Patient seen by provider today: No   present during visit today: Not Applicable.    Note: N/A.      Intravenous Access:  Labs drawn without difficulty.  Implanted Port.    Treatment Conditions:  Lab Results   Component Value Date    HGB 11.3 (L) 12/10/2024    WBC 6.5 12/10/2024    ANEUTAUTO 4.1 12/10/2024     (L) 12/10/2024        Lab Results   Component Value Date     12/10/2024    POTASSIUM 4.0 12/10/2024    CR 1.09 12/10/2024    RAUDEL 9.7 12/10/2024    BILITOTAL 0.4 12/10/2024    ALBUMIN 4.3 12/10/2024    ALT 31 12/10/2024    AST 32 12/10/2024       Results reviewed, labs MET treatment parameters, ok to proceed with treatment.      Post Infusion Assessment:  Patient tolerated infusion without incident.  Blood return noted pre and post infusion.  No evidence of extravasations.  Prior to discharge: Port is secured in place with tegaderm and flushed with 10cc NS with positive blood return noted.  Continuous home infusion CADD pump connected.    All connectors secured in place and clamps taped open.    Pump started, \"running\" noted on display (CADD): YES.  Pump Connection double checked with n/a.  Patient instructed to call our clinic or [facility] with any questions or concerns at home.  Patient verbalized understanding.    Patient set up for pump disconnect [facility] on 12/12.         Discharge Plan:   Patient and/or family verbalized understanding of discharge instructions and all questions answered.      HARPREET ACEVEDO    " CTL met with pt at bedside. Pt is BPCI eligible under . Remedy [facility] Program including 90 day phone call follow up reviewed with pt who verbalized understanding. Remedy Brochure and Medicare letter provided.     Pt anticipates going to [facility]

## 2024-12-18 ENCOUNTER — HOSPITAL ENCOUNTER (OUTPATIENT)
Dept: PET IMAGING | Facility: HOSPITAL | Age: 69
Discharge: HOME | End: 2024-12-18
Attending: INTERNAL MEDICINE
Payer: COMMERCIAL

## 2024-12-18 DIAGNOSIS — C20 RECTAL CANCER (H): ICD-10-CM

## 2024-12-18 PROCEDURE — A9552 F18 FDG: HCPCS | Performed by: INTERNAL MEDICINE

## 2024-12-18 PROCEDURE — 343N000001 HC RX 343 MED OP 636: Performed by: INTERNAL MEDICINE

## 2024-12-18 PROCEDURE — 78815 PET IMAGE W/CT SKULL-THIGH: CPT | Mod: PS

## 2024-12-18 RX ORDER — FLUDEOXYGLUCOSE F 18 200 MCI/ML
1061 INJECTION, SOLUTION INTRAVENOUS ONCE
Status: COMPLETED | OUTPATIENT
Start: 2024-12-18 | End: 2024-12-18

## 2024-12-18 RX ADMIN — FLUDEOXYGLUCOSE F 18 1061 MILLICURIE: 200 INJECTION, SOLUTION INTRAVENOUS at 11:26

## 2024-12-19 ENCOUNTER — MYC MEDICAL ADVICE (OUTPATIENT)
Dept: ONCOLOGY | Facility: OTHER | Age: 69
End: 2024-12-19

## 2024-12-19 ENCOUNTER — ONCOLOGY VISIT (OUTPATIENT)
Dept: ONCOLOGY | Facility: OTHER | Age: 69
End: 2024-12-19
Attending: INTERNAL MEDICINE
Payer: COMMERCIAL

## 2024-12-19 VITALS
BODY MASS INDEX: 26.22 KG/M2 | DIASTOLIC BLOOD PRESSURE: 62 MMHG | HEART RATE: 61 BPM | SYSTOLIC BLOOD PRESSURE: 120 MMHG | HEIGHT: 69 IN | RESPIRATION RATE: 20 BRPM | OXYGEN SATURATION: 97 % | WEIGHT: 177.03 LBS | TEMPERATURE: 97.5 F

## 2024-12-19 DIAGNOSIS — C20 RECTAL CANCER (H): Primary | ICD-10-CM

## 2024-12-19 PROCEDURE — G0463 HOSPITAL OUTPT CLINIC VISIT: HCPCS

## 2024-12-19 RX ORDER — METHYLPREDNISOLONE SODIUM SUCCINATE 125 MG/2ML
125 INJECTION INTRAMUSCULAR; INTRAVENOUS
Start: 2024-12-24

## 2024-12-19 RX ORDER — HEPARIN SODIUM,PORCINE 10 UNIT/ML
5-20 VIAL (ML) INTRAVENOUS DAILY PRN
OUTPATIENT
Start: 2024-12-26

## 2024-12-19 RX ORDER — MEPERIDINE HYDROCHLORIDE 25 MG/ML
25 INJECTION INTRAMUSCULAR; INTRAVENOUS; SUBCUTANEOUS EVERY 30 MIN PRN
OUTPATIENT
Start: 2024-12-24

## 2024-12-19 RX ORDER — HEPARIN SODIUM (PORCINE) LOCK FLUSH IV SOLN 100 UNIT/ML 100 UNIT/ML
5 SOLUTION INTRAVENOUS
OUTPATIENT
Start: 2024-12-24

## 2024-12-19 RX ORDER — ALBUTEROL SULFATE 90 UG/1
1-2 INHALANT RESPIRATORY (INHALATION)
Start: 2024-12-24

## 2024-12-19 RX ORDER — EPINEPHRINE 1 MG/ML
0.3 INJECTION, SOLUTION, CONCENTRATE INTRAVENOUS EVERY 5 MIN PRN
OUTPATIENT
Start: 2024-12-24

## 2024-12-19 RX ORDER — HEPARIN SODIUM,PORCINE 10 UNIT/ML
5-20 VIAL (ML) INTRAVENOUS DAILY PRN
OUTPATIENT
Start: 2024-12-24

## 2024-12-19 RX ORDER — HEPARIN SODIUM (PORCINE) LOCK FLUSH IV SOLN 100 UNIT/ML 100 UNIT/ML
5 SOLUTION INTRAVENOUS
OUTPATIENT
Start: 2024-12-26

## 2024-12-19 RX ORDER — FLUOROURACIL 50 MG/ML
400 INJECTION, SOLUTION INTRAVENOUS ONCE
OUTPATIENT
Start: 2024-12-24

## 2024-12-19 RX ORDER — DIPHENHYDRAMINE HYDROCHLORIDE 50 MG/ML
50 INJECTION INTRAMUSCULAR; INTRAVENOUS
Start: 2024-12-24

## 2024-12-19 RX ORDER — ALBUTEROL SULFATE 0.83 MG/ML
2.5 SOLUTION RESPIRATORY (INHALATION)
OUTPATIENT
Start: 2024-12-24

## 2024-12-19 RX ORDER — ONDANSETRON 2 MG/ML
8 INJECTION INTRAMUSCULAR; INTRAVENOUS ONCE
OUTPATIENT
Start: 2024-12-24

## 2024-12-19 RX ORDER — LORAZEPAM 2 MG/ML
0.5 INJECTION INTRAMUSCULAR EVERY 4 HOURS PRN
OUTPATIENT
Start: 2024-12-24

## 2024-12-19 ASSESSMENT — PAIN SCALES - GENERAL: PAINLEVEL_OUTOF10: NO PAIN (0)

## 2024-12-19 NOTE — PROGRESS NOTES
MEDICAL ONCOLOGY FOLLOW-UP NOTE  Dec 19, 2024    Reason for follow-up: Rectal cancer    HISTORY OF PRESENT ILLNESS  Tal Mcbride is a 69 year old male with PMH as stated below who is seen in the oncology clinic for rectal cancer.    His history in short is as follows:    Mr. Mcbride started having diarrhea in the end of March 2024.  He was evaluated by his primary care and recommended to undergo a colonoscopy and upper endoscopy.    4/29/2024: EGD: 3 cm hiatal hernia.  Normal stomach.  Normal third portion of the duodenum.  Nonobstructing Schatzki's ring.    4/29/2024: Sigmoidoscopy: The digital rectal exam revealed a soft tissue mass palpated 8 cm from the anal verge.  The mass was noncircumferential and located predominantly at the posterior bowel wall.  An infiltrative partially obstructing large mass was found in the rectum.  The mass was circumferential.  The mass measured 4 cm in length.  No bleeding was present.  This was biopsied.  A pediatric colonoscope was passed past the area but patient had a large quantity of stool present just above the stricture so colonoscopy was not able to be completed.    Biopsy of the rectal mass showed moderately to poorly differentiated adenocarcinoma.  [facility]    5/2/2024 CT chest with contrast: No definite findings for metastatic disease in the chest. 2 small pulmonary nodules measuring up to 4 mm unchanged from prior study     5/2/2024: MRI pelvis with and without contrast: A carcinoma involving the lower, middle and upper rectum extending over a length of roughly 12 cm demonstrated.  This lesion involves the mesorectum with no significant invasion anterolaterally on the right at the level of the mid rectum.  At this site infiltrative neoplasm is contactless with the posterior margin of the right seminal vesicle but it is not clearly invaded.  Neoplasm extends roughly 1.5 cm beyond the rectal margin.  Numerous enlarged mesorectal lymph nodes are demonstrated.  The larger  mesorectal lymph node is demonstrated on the right measuring 1.8X 1.5 cm.  Additional mesorectal lymph nodes with short axis measurements at least 1 cm demonstrated.  Inferior mesenteric lymph nodes with short axis measurement of 1.1 cm X 1.0 cm are noted.  A left internal iliac node measuring 1.2X 0.8 cm is demonstrated.  A 2.7 X1.8 centimeter right iliac node is demonstrated.  The other visualized bowel is unremarkable.  The prostate is mildly enlarged.  No free fluid is demonstrated.    5/13/2024: CT abdomen and pelvis without contrast: Large infiltrative rectal mass involving the upper middle and lower rectum.  There is surrounding inflammatory change and pelvic free fluid.  There is adjacent mesorectal adenopathy, a left internal iliac node as well.  Large amount of stool throughout the colon.  Enlarged prostate gland.  Left probable UPJ obstruction.    6/6/2024: Diverting colostomy    7/24/2024: Cycle 1 of FOLFOX    7/25/2024: PET scan: Masslike thickening with associated FDG avidity throughout the rectum is consistent with given history.  Metastatic disease within the liver, retroperitoneal nodes, perirectal nodes and inguinal nodes.  Findings are concerning for a UPJ obstruction.  Nuclear medicine renal Lasix scan for further characterize.    10/11/2024: CT chest, abdomen and pelvis: IMPRESSION: There is a new area of abnormal low density in the liver  suspicious for metastatic disease. There has been decrease in size in  a liver nodule seen in the medial segment of the left lobe from  previous examination. Other liver lesions seen on the PET CT scan were  not resolved by CT scanning. A left inguinal lymph node with abnormal  hypermetabolism on the PET CT scan has decreased in size as compared  to previous PET/CT examination.    Interim history:    Mr. Mcbride is doing well today.  He denies any worsening nausea or diarrhea.Denies abdominal pain. Neuropathy at baseline. His bowels has prolapsed into ostomy.  Ostomy is functioning. He has pain.     REVIEW OF SYSTEMS  A 12-point ROS negative except as in HPI      Current Outpatient Medications   Medication Sig Dispense Refill    aspirin 81 MG EC tablet Take 81 mg by mouth daily      atorvastatin (LIPITOR) 20 MG tablet Take 20 mg by mouth daily      dexAMETHasone (DECADRON) 4 MG tablet Take 2 tablets (8 mg) by mouth daily. Take for 2 days, starting the day after chemo. Take with food. 20 tablet 2    [START ON 12/23/2024] Fluorouracil (ADRUCIL) 4,750 mg in sodium chloride 0.9 % 230 mL via CADD pump Infuse 4,750 mg at 5 mL/hr over 46 hours into the vein once for 1 dose. 219646 mL 0    glipiZIDE (GLUCOTROL XL) 2.5 MG 24 hr tablet Take 2.5 mg by mouth daily. Outside prescriber      hydrochlorothiazide (HYDRODIURIL) 25 MG tablet Take 25 mg by mouth daily      lisinopril (ZESTRIL) 40 MG tablet Take 40 mg by mouth daily      metFORMIN (GLUCOPHAGE-XR) 750 MG 24 hr tablet Take 500 mg by mouth 2 times daily (with meals).      multivitamin w/minerals (MULTI-VITAMIN) tablet Take 1 tablet by mouth daily      Polyethylene Glycol 3350 (MIRALAX PO) Take 1 Capful by mouth daily      acetaminophen (TYLENOL) 500 MG tablet Take 650 mg by mouth every 6 hours as needed for mild pain. (Patient not taking: Reported on 12/19/2024)      lidocaine-prilocaine (EMLA) 2.5-2.5 % external cream Apply topically daily as needed for moderate pain (Patient not taking: Reported on 12/19/2024) 30 g 1    loperamide (IMODIUM A-D) 2 MG tablet Take 1 tablet (2 mg) by mouth 4 times daily as needed for diarrhea (Patient not taking: Reported on 12/19/2024) 90 tablet 0    ondansetron (ZOFRAN) 8 MG tablet Take 1 tablet (8 mg) by mouth every 8 hours as needed for nausea (vomiting) (Patient not taking: Reported on 11/12/2024) 30 tablet 2    Oxymetazoline HCl (NASAL DECONGESTANT SPRAY NA) Spray in nostril as needed. (Patient not taking: Reported on 12/19/2024)      prochlorperazine (COMPAZINE) 5 MG tablet Take 1 tablet (5  mg) by mouth every 6 hours as needed for nausea or vomiting (Patient not taking: Reported on 11/12/2024) 90 tablet 0    traMADol (ULTRAM) 50 MG tablet Take 1 tablet (50 mg) by mouth every 6 hours as needed for severe pain (Patient not taking: Reported on 11/12/2024) 40 tablet 0    traZODone (DESYREL) 100 MG tablet Take 100 mg by mouth at bedtime. (Patient not taking: Reported on 11/12/2024)         No Known Allergies  Immunization History   Administered Date(s) Administered    COVID-19 Bivalent 12+ (Pfizer) 10/27/2022    COVID-19 Monovalent 18+ (Moderna) 01/16/2021, 02/13/2021, 11/29/2021    Hepatitis B, Adult 10/03/2013, 03/28/2014, 12/30/2014    Influenza Vaccine (Flucelvax Quadrivalent) 10/07/2022    Pneumococcal 23 valent 10/03/2013    TDAP (Adacel,Boostrix) 10/03/2012       Past Medical History:   Diagnosis Date    Adolescent idiopathic scoliosis of thoracolumbar region 11/16/2017    Bunion 03/28/2013    Essential hypertension 03/05/2012    Formatting of this note might be different from the original.   Epic      Pure hypercholesterolemia 10/04/2012    Rectal cancer (H) 05/29/2024    Scoliosis     Severe protein-calorie malnutrition (H) 06/07/2024    Type 2 diabetes mellitus without complication (H) 06/21/2012       Past Surgical History:   Procedure Laterality Date    CATARACT EXTRACTION W/ INTRAOCULAR LENS IMPLANT Left 2021    ENDOSCOPY N/A     upper and lower    GI SURGERY      PHACOEMULSIFICATION WITH STANDARD INTRAOCULAR LENS IMPLANT Right 09/29/2021    Procedure: right Cataract Extraction with Implant;  Surgeon: Juan Prado MD;  Location: [facility] OR    robotic colostomy N/A 06/06/2024       SOCIAL HISTORY  History   Smoking Status    Former    Types: Cigarettes   Smokeless Tobacco    Former    Social History    Substance and Sexual Activity      Alcohol use: Not Currently     History   Drug Use Unknown       FAMILY HISTORY  Family History   Problem Relation Age of Onset    Hypertension Mother      "Pancreatic Cancer Father 55    Hypertension Sister     Other Problems  (knee replacement) Sister         bilateal knee replacement    Other Problems  (hip replacement) Sister         bilateral hip replacement    Diabetes No family hx of     Coronary Artery Disease No family hx of     Hyperlipidemia No family hx of     Cerebrovascular Disease No family hx of     Breast Cancer No family hx of     Colon Cancer No family hx of     Prostate Cancer No family hx of     Anesthesia Reaction No family hx of     Asthma No family hx of     Genetic Disorder No family hx of     Thyroid Disease No family hx of        PHYSICAL EXAMINATION  /62   Pulse 61   Temp 97.5  F (36.4  C) (Tympanic)   Resp 20   Ht 1.753 m (5' 9\")   Wt 80.3 kg (177 lb 0.5 oz)   SpO2 97%   BMI 26.14 kg/m    Wt Readings from Last 2 Encounters:   12/19/24 80.3 kg (177 lb 0.5 oz)   12/10/24 81.7 kg (180 lb 1.6 oz)     Physical Exam  Constitutional:       Appearance: Normal appearance.   Abdominal:      General: Abdomen is flat.   Neurological:      General: No focal deficit present.      Mental Status: He is alert and oriented to person, place, and time.   Psychiatric:         Mood and Affect: Mood normal.     Laboratory and imaging:     Latest Reference Range & Units 12/10/24 09:01   WBC 4.0 - 11.0 10e3/uL 6.5   Hemoglobin 13.3 - 17.7 g/dL 11.3 (L)   Hematocrit 40.0 - 53.0 % 33.1 (L)   Platelet Count 150 - 450 10e3/uL 146 (L)   (L): Data is abnormally low    Foundation one: no reportable RNA variants EGFR amplification KRAS wildtype NRAS wildtype APC * ARIDIA * TP53 splice site 560-2A>G Diseas  e relevant genes with no reportable alterations: BRAF, ERBB2, KRAS, NRAS.    ASSESSMENT AND PLAN    1.  Stage IV rectal cancer: K-pastora wild-type, ANDI    Initially presented with diarrhea in March 2024.  Sigmoidoscopy showed rectal mass infiltrative partially obstructing large mass 8 cm from the anal verge.  MRI pelvis showed carcinoma involving " lower, middle and upper rectum extending over a length of roughly 12 cm.  Lesion involves the mesorectum but most significant invasion anterolaterally on the right at the level of the mid rectum.  Neoplasm is contiguous with the posterior margin of the right seminal vesicle but is not clearly invading it.  Extends roughly 1.5 cm beyond the rectal margin.  It also showed numerous enlarged mesorectal lymph nodes.  A inferior mesenteric lymph node was also noted.  There were also enlarged left iliac nodes and right iliac node.    He has already had a diverting ostomy by  on 6/6/2024.    He was then found to have metastatic disease on PET scan.   PET scan showed 5 foci of abnormal avidity present within the liver, involvement of the common iliac lymph node, upper abdominal para-aortic lymph node, right common iliac node and left perirectal node.  A few inguinal nodes are also avid.    Currently on palliative chemotherapy with FOLFOX which he started on 7/24/2024. He is status post cycle 11 PET scan done on 12/18/2024 shows response to treatment with just a focal iver mets seen on PET scan at this time.     He will proceed to cycle 12 on 12/23/2024.  Following that we will move onto maintenance.  We have previously discussed options for maintenance.  He feels that that having the back impacts his quality of life.  Will therefore move to capecitabine.  Risk of diarrhea, hand-foot syndrome, low blood counts, kidney liver toxicity were discussed.  Will move on to 1500 mg twice daily daily 1-14 after cycle 12 of FOLFOX.  We also previously discussed the option of adding bevacizumab to his treatment however he would like to hold off for now.    We  will see him back in clinic 2 weeks from his treatment with labs    Total time spent on the patient on day of encounter was 46 minutes doing chart review, review of test results, interpretation of results, patient visit and documentation.       Shawna Poon MD

## 2024-12-19 NOTE — NURSING NOTE
"Oncology Rooming Note    December 19, 2024 9:36 AM   Tal Mcbride is a 69 year old male who presents for:    Chief Complaint   Patient presents with    Oncology Clinic Visit     Follow up Rectal cancer     Initial Vitals: /62   Pulse 61   Temp 97.5  F (36.4  C) (Tympanic)   Resp 20   Ht 1.753 m (5' 9\")   Wt 80.3 kg (177 lb 0.5 oz)   SpO2 97%   BMI 26.14 kg/m   Estimated body mass index is 26.14 kg/m  as calculated from the following:    Height as of this encounter: 1.753 m (5' 9\").    Weight as of this encounter: 80.3 kg (177 lb 0.5 oz). Body surface area is 1.98 meters squared.  No Pain (0) Comment: Data Unavailable   No LMP for male patient.  Allergies reviewed: Yes  Medications reviewed: Yes    Medications: Medication refills not needed today.  Pharmacy name entered into [employer]: [facility] - [place], [place] - [address]    Frailty Screening:   Is the patient here for a new oncology consult visit in cancer care? 2. No      Clinical concerns: Declined labs from port today states he will have them done on Monday prior to treatment.      Hermila Fletcher LPN             "

## 2024-12-19 NOTE — Clinical Note
.  His 12 cycles of FOLFOX he will switch to maintenance capecitabine.  I put the orders in and the treatment plan for capecitabine.  Once the FOLFOX pump off is done I think we have to just cancel the treatment plan so the capecitabine order can be fulfilled

## 2024-12-20 ENCOUNTER — HOME INFUSION BILLING (OUTPATIENT)
Dept: HOME HEALTH SERVICES | Facility: HOME HEALTH | Age: 69
End: 2024-12-20
Payer: COMMERCIAL

## 2024-12-20 PROCEDURE — E1399 DURABLE MEDICAL EQUIPMENT MI: HCPCS

## 2024-12-20 PROCEDURE — E0781 EXTERNAL AMBULATORY INFUS PU: HCPCS | Mod: RR

## 2024-12-23 ENCOUNTER — INFUSION THERAPY VISIT (OUTPATIENT)
Dept: INFUSION THERAPY | Facility: OTHER | Age: 69
End: 2024-12-23
Attending: INTERNAL MEDICINE
Payer: COMMERCIAL

## 2024-12-23 VITALS
BODY MASS INDEX: 26.53 KG/M2 | RESPIRATION RATE: 17 BRPM | TEMPERATURE: 97.6 F | WEIGHT: 179.68 LBS | SYSTOLIC BLOOD PRESSURE: 146 MMHG | OXYGEN SATURATION: 95 % | DIASTOLIC BLOOD PRESSURE: 68 MMHG | HEART RATE: 55 BPM

## 2024-12-23 DIAGNOSIS — C20 RECTAL CANCER (H): Primary | ICD-10-CM

## 2024-12-23 LAB
ALBUMIN SERPL BCG-MCNC: 4.2 G/DL (ref 3.5–5.2)
ALP SERPL-CCNC: 88 U/L (ref 40–150)
ALT SERPL W P-5'-P-CCNC: 30 U/L (ref 0–70)
ANION GAP SERPL CALCULATED.3IONS-SCNC: 10 MMOL/L (ref 7–15)
AST SERPL W P-5'-P-CCNC: 31 U/L (ref 0–45)
BASOPHILS # BLD AUTO: 0.1 10E3/UL (ref 0–0.2)
BASOPHILS NFR BLD AUTO: 1 %
BILIRUB SERPL-MCNC: 0.3 MG/DL
BUN SERPL-MCNC: 16.7 MG/DL (ref 8–23)
CALCIUM SERPL-MCNC: 9.6 MG/DL (ref 8.8–10.4)
CEA SERPL-MCNC: 5.1 NG/ML
CHLORIDE SERPL-SCNC: 102 MMOL/L (ref 98–107)
CREAT SERPL-MCNC: 1.12 MG/DL (ref 0.67–1.17)
EGFRCR SERPLBLD CKD-EPI 2021: 71 ML/MIN/1.73M2
EOSINOPHIL # BLD AUTO: 0.2 10E3/UL (ref 0–0.7)
EOSINOPHIL NFR BLD AUTO: 2 %
ERYTHROCYTE [DISTWIDTH] IN BLOOD BY AUTOMATED COUNT: 14 % (ref 10–15)
GLUCOSE SERPL-MCNC: 156 MG/DL (ref 70–99)
HCO3 SERPL-SCNC: 25 MMOL/L (ref 22–29)
HCT VFR BLD AUTO: 32.4 % (ref 40–53)
HGB BLD-MCNC: 11.1 G/DL (ref 13.3–17.7)
IMM GRANULOCYTES # BLD: 0 10E3/UL
IMM GRANULOCYTES NFR BLD: 1 %
LYMPHOCYTES # BLD AUTO: 1.8 10E3/UL (ref 0.8–5.3)
LYMPHOCYTES NFR BLD AUTO: 25 %
MCH RBC QN AUTO: 33.6 PG (ref 26.5–33)
MCHC RBC AUTO-ENTMCNC: 34.3 G/DL (ref 31.5–36.5)
MCV RBC AUTO: 98 FL (ref 78–100)
MONOCYTES # BLD AUTO: 0.9 10E3/UL (ref 0–1.3)
MONOCYTES NFR BLD AUTO: 12 %
NEUTROPHILS # BLD AUTO: 4.5 10E3/UL (ref 1.6–8.3)
NEUTROPHILS NFR BLD AUTO: 60 %
NRBC # BLD AUTO: 0 10E3/UL
NRBC BLD AUTO-RTO: 0 /100
PLATELET # BLD AUTO: 157 10E3/UL (ref 150–450)
POTASSIUM SERPL-SCNC: 4.2 MMOL/L (ref 3.4–5.3)
PROT SERPL-MCNC: 6.7 G/DL (ref 6.4–8.3)
RBC # BLD AUTO: 3.3 10E6/UL (ref 4.4–5.9)
SODIUM SERPL-SCNC: 137 MMOL/L (ref 135–145)
WBC # BLD AUTO: 7.5 10E3/UL (ref 4–11)

## 2024-12-23 PROCEDURE — 85004 AUTOMATED DIFF WBC COUNT: CPT | Mod: ZL

## 2024-12-23 PROCEDURE — 36415 COLL VENOUS BLD VENIPUNCTURE: CPT

## 2024-12-23 PROCEDURE — A4221 SUPP NON-INSULIN INF CATH/WK: HCPCS

## 2024-12-23 PROCEDURE — A4222 INFUSION SUPPLIES WITH PUMP: HCPCS

## 2024-12-23 PROCEDURE — 80053 COMPREHEN METABOLIC PANEL: CPT | Mod: ZL

## 2024-12-23 PROCEDURE — 84460 ALANINE AMINO (ALT) (SGPT): CPT | Mod: ZL

## 2024-12-23 PROCEDURE — 258N000003 HC RX IP 258 OP 636: Performed by: INTERNAL MEDICINE

## 2024-12-23 PROCEDURE — 250N000011 HC RX IP 250 OP 636: Mod: JZ | Performed by: INTERNAL MEDICINE

## 2024-12-23 PROCEDURE — S9330 HIT CONT CHEM DIEM: HCPCS

## 2024-12-23 PROCEDURE — 82947 ASSAY GLUCOSE BLOOD QUANT: CPT | Mod: ZL

## 2024-12-23 PROCEDURE — 82378 CARCINOEMBRYONIC ANTIGEN: CPT | Mod: ZL

## 2024-12-23 RX ORDER — FLUOROURACIL 50 MG/ML
400 INJECTION, SOLUTION INTRAVENOUS ONCE
Status: COMPLETED | OUTPATIENT
Start: 2024-12-23 | End: 2024-12-23

## 2024-12-23 RX ORDER — ONDANSETRON 2 MG/ML
8 INJECTION INTRAMUSCULAR; INTRAVENOUS ONCE
Status: COMPLETED | OUTPATIENT
Start: 2024-12-23 | End: 2024-12-23

## 2024-12-23 RX ADMIN — ONDANSETRON 8 MG: 2 INJECTION INTRAMUSCULAR; INTRAVENOUS at 09:42

## 2024-12-23 RX ADMIN — DEXTROSE MONOHYDRATE 250 ML: 50 INJECTION, SOLUTION INTRAVENOUS at 10:14

## 2024-12-23 RX ADMIN — OXALIPLATIN 130 MG: 5 INJECTION, SOLUTION INTRAVENOUS at 10:19

## 2024-12-23 RX ADMIN — FOSAPREPITANT: 150 INJECTION, POWDER, LYOPHILIZED, FOR SOLUTION INTRAVENOUS at 09:44

## 2024-12-23 RX ADMIN — LEUCOVORIN CALCIUM 800 MG: 500 INJECTION, POWDER, LYOPHILIZED, FOR SOLUTION INTRAMUSCULAR; INTRAVENOUS at 10:16

## 2024-12-23 RX ADMIN — FLUOROURACIL 790 MG: 50 INJECTION, SOLUTION INTRAVENOUS at 12:38

## 2024-12-23 NOTE — PROGRESS NOTES
Infusion Nursing Note:  Tal Mcbride presents today for folfox.    Patient seen by provider today: No   present during visit today: Not Applicable.    Note: N/A.      Intravenous Access:  Labs drawn without difficulty.  Implanted Port.    Treatment Conditions:  Lab Results   Component Value Date    HGB 11.1 (L) 12/23/2024    WBC 7.5 12/23/2024    ANEUTAUTO 4.5 12/23/2024     12/23/2024        Lab Results   Component Value Date     12/23/2024    POTASSIUM 4.2 12/23/2024    CR 1.12 12/23/2024    RAUDEL 9.6 12/23/2024    BILITOTAL 0.3 12/23/2024    ALBUMIN 4.2 12/23/2024    ALT 30 12/23/2024    AST 31 12/23/2024       Results reviewed, labs MET treatment parameters, ok to proceed with treatment.    Home chemo cadd pump put on. Pt states he will go to [facility] for pump off on renetta day  Post Infusion Assessment:  Patient tolerated infusion without incident.  Blood return noted pre and post infusion.  Site patent and intact, free from redness, edema or discomfort.  No evidence of extravasations.       Discharge Plan:   Patient and/or family verbalized understanding of discharge instructions and all questions answer        KRISTINA SIERRA RN

## 2024-12-24 PROCEDURE — S9330 HIT CONT CHEM DIEM: HCPCS

## 2024-12-24 PROCEDURE — A4222 INFUSION SUPPLIES WITH PUMP: HCPCS

## 2025-01-06 ENCOUNTER — PATIENT OUTREACH (OUTPATIENT)
Dept: ONCOLOGY | Facility: OTHER | Age: 70
End: 2025-01-06

## 2025-01-06 NOTE — PROGRESS NOTES
[facility]/Voicemail    Clinical Data: Care Coordinator Outreach    Outreach attempted x 1.  Left message on patient's voicemail with call back information and requested return call.    Plan: Care Coordinator will try to reach patient again in 1-2 business days.    Did patient receive his capecitabine

## 2025-01-08 ENCOUNTER — ONCOLOGY VISIT (OUTPATIENT)
Dept: ONCOLOGY | Facility: OTHER | Age: 70
End: 2025-01-08
Attending: INTERNAL MEDICINE
Payer: COMMERCIAL

## 2025-01-08 VITALS
HEART RATE: 69 BPM | RESPIRATION RATE: 20 BRPM | SYSTOLIC BLOOD PRESSURE: 140 MMHG | WEIGHT: 177.91 LBS | HEIGHT: 69 IN | BODY MASS INDEX: 26.35 KG/M2 | DIASTOLIC BLOOD PRESSURE: 80 MMHG | TEMPERATURE: 98.3 F | OXYGEN SATURATION: 97 %

## 2025-01-08 DIAGNOSIS — C20 RECTAL CANCER (H): Primary | ICD-10-CM

## 2025-01-08 LAB
ALBUMIN SERPL BCG-MCNC: 4.5 G/DL (ref 3.5–5.2)
ALP SERPL-CCNC: 106 U/L (ref 40–150)
ALT SERPL W P-5'-P-CCNC: 29 U/L (ref 0–70)
ANION GAP SERPL CALCULATED.3IONS-SCNC: 15 MMOL/L (ref 7–15)
AST SERPL W P-5'-P-CCNC: 29 U/L (ref 0–45)
BASOPHILS # BLD AUTO: 0.1 10E3/UL (ref 0–0.2)
BASOPHILS NFR BLD AUTO: 1 %
BILIRUB SERPL-MCNC: 0.4 MG/DL
BUN SERPL-MCNC: 20 MG/DL (ref 8–23)
CALCIUM SERPL-MCNC: 9.9 MG/DL (ref 8.8–10.4)
CHLORIDE SERPL-SCNC: 101 MMOL/L (ref 98–107)
CREAT SERPL-MCNC: 1.17 MG/DL (ref 0.67–1.17)
EGFRCR SERPLBLD CKD-EPI 2021: 67 ML/MIN/1.73M2
EOSINOPHIL # BLD AUTO: 0.2 10E3/UL (ref 0–0.7)
EOSINOPHIL NFR BLD AUTO: 3 %
ERYTHROCYTE [DISTWIDTH] IN BLOOD BY AUTOMATED COUNT: 14 % (ref 10–15)
GLUCOSE SERPL-MCNC: 163 MG/DL (ref 70–99)
HCO3 SERPL-SCNC: 23 MMOL/L (ref 22–29)
HCT VFR BLD AUTO: 35.2 % (ref 40–53)
HGB BLD-MCNC: 12 G/DL (ref 13.3–17.7)
IMM GRANULOCYTES # BLD: 0 10E3/UL
IMM GRANULOCYTES NFR BLD: 1 %
LYMPHOCYTES # BLD AUTO: 2 10E3/UL (ref 0.8–5.3)
LYMPHOCYTES NFR BLD AUTO: 36 %
MCH RBC QN AUTO: 33.4 PG (ref 26.5–33)
MCHC RBC AUTO-ENTMCNC: 34.1 G/DL (ref 31.5–36.5)
MCV RBC AUTO: 98 FL (ref 78–100)
MONOCYTES # BLD AUTO: 0.9 10E3/UL (ref 0–1.3)
MONOCYTES NFR BLD AUTO: 16 %
NEUTROPHILS # BLD AUTO: 2.4 10E3/UL (ref 1.6–8.3)
NEUTROPHILS NFR BLD AUTO: 44 %
NRBC # BLD AUTO: 0 10E3/UL
NRBC BLD AUTO-RTO: 0 /100
PLATELET # BLD AUTO: 226 10E3/UL (ref 150–450)
POTASSIUM SERPL-SCNC: 4 MMOL/L (ref 3.4–5.3)
PROT SERPL-MCNC: 7.2 G/DL (ref 6.4–8.3)
RBC # BLD AUTO: 3.59 10E6/UL (ref 4.4–5.9)
SODIUM SERPL-SCNC: 139 MMOL/L (ref 135–145)
WBC # BLD AUTO: 5.5 10E3/UL (ref 4–11)

## 2025-01-08 ASSESSMENT — PAIN SCALES - GENERAL: PAINLEVEL_OUTOF10: NO PAIN (0)

## 2025-01-08 NOTE — PATIENT INSTRUCTIONS
How to take Capecitabine: Take 3 pills in the morning and 3 at night for day 1-14. Then 7 days off.     Please put numbing cream on your port every time you come to see us.     We will see you before cycle 2 of your Capecitabine with labs.

## 2025-01-08 NOTE — PROGRESS NOTES
Peripheral labs ordered. Butterfly needle inserted into right antecubital .  Immediate blood return noted. Ordered labs drawn. Needle removed, covered with sterile guaze and coban. Patient tolerated well, denies pain or discomfort at this time. Patient discharged.

## 2025-01-08 NOTE — PROGRESS NOTES
Upon arrival to patient room.  Patient refused having his blood drawn from his port.  Sticking his arm out and saying you'll have to get it from my arm.  I informed patient that we could call lab to come down to get his labs if he would like.  I also informed the patient that I was not a  and the reason he is on the nurses schedule for labs from port is so that we can do port maintenance in addition to getting labs.  Patient became defensive and argued that no one told him that he was supposed to have his port accessed.  I left the room telling him that I would draw his labs from his arm but going forward he will need to be set up at the clinic for Lab. I came back to office an spoke with colleague  and we decided to recommend patient having labs and port flushes in [place] as it is closer and patient is unwilling to have done here.  This writer approached patient again and patient continued to argumentative stating he know people who have had their port for ten years and never had it flushed and he was not going to have it flushed, further stating that no  ever told him he needed port maintenance, I tried to explain to patient that he would need to have his labs drawn every 3 weeks and need his port flushed every 4 weeks and if he would like we could set him up with labs from port in [place], then he started arguing that he would just have his PCP handle it, I told him that his chemo is managed by [facility] and [facility] would need to send orders to [place], patient continued to argue we could look in the portal.  Patient continued be argumentative, his friend tried explaining to him this and he continued to argue.  Patient then said no one told me that I was having my labs from my port today and I did not put on my Emla cream.  I stated we can draw them from your arm today, and we can set you up in [place] for labs or set you up with our clinic for labs going forward.  Patient then said last time I had you you had me in  tears because you missed my port.  Reported patient concerns regarding labs from port, to my provider, provider will talk to patient about port maintenance and keeping on track with schedule.  Patient educated that he should put emla cream on his port prior to all visit with the provider if he wants labs from port or port maintenance.

## 2025-01-08 NOTE — PROGRESS NOTES
MEDICAL ONCOLOGY FOLLOW-UP NOTE  Jan 8, 2025    Reason for follow-up: Rectal cancer    HISTORY OF PRESENT ILLNESS  Tal Mcbride is a 69 year old male with PMH as stated below who is seen in the oncology clinic for rectal cancer.    His history in short is as follows:    Mr. Mcbride started having diarrhea in the end of March 2024.  He was evaluated by his primary care and recommended to undergo a colonoscopy and upper endoscopy.    4/29/2024: EGD: 3 cm hiatal hernia.  Normal stomach.  Normal third portion of the duodenum.  Nonobstructing Schatzki's ring.    4/29/2024: Sigmoidoscopy: The digital rectal exam revealed a soft tissue mass palpated 8 cm from the anal verge.  The mass was noncircumferential and located predominantly at the posterior bowel wall.  An infiltrative partially obstructing large mass was found in the rectum.  The mass was circumferential.  The mass measured 4 cm in length.  No bleeding was present.  This was biopsied.  A pediatric colonoscope was passed past the area but patient had a large quantity of stool present just above the stricture so colonoscopy was not able to be completed.    Biopsy of the rectal mass showed moderately to poorly differentiated adenocarcinoma.  [facility] [place]    5/2/2024 CT chest with contrast: No definite findings for metastatic disease in the chest. 2 small pulmonary nodules measuring up to 4 mm unchanged from prior study     5/2/2024: MRI pelvis with and without contrast: A carcinoma involving the lower, middle and upper rectum extending over a length of roughly 12 cm demonstrated.  This lesion involves the mesorectum with no significant invasion anterolaterally on the right at the level of the mid rectum.  At this site infiltrative neoplasm is contactless with the posterior margin of the right seminal vesicle but it is not clearly invaded.  Neoplasm extends roughly 1.5 cm beyond the rectal margin.  Numerous enlarged mesorectal lymph nodes are demonstrated.  The larger  mesorectal lymph node is demonstrated on the right measuring 1.8X 1.5 cm.  Additional mesorectal lymph nodes with short axis measurements at least 1 cm demonstrated.  Inferior mesenteric lymph nodes with short axis measurement of 1.1 cm X 1.0 cm are noted.  A left internal iliac node measuring 1.2X 0.8 cm is demonstrated.  A 2.7 X1.8 centimeter right iliac node is demonstrated.  The other visualized bowel is unremarkable.  The prostate is mildly enlarged.  No free fluid is demonstrated.    5/13/2024: CT abdomen and pelvis without contrast: Large infiltrative rectal mass involving the upper middle and lower rectum.  There is surrounding inflammatory change and pelvic free fluid.  There is adjacent mesorectal adenopathy, a left internal iliac node as well.  Large amount of stool throughout the colon.  Enlarged prostate gland.  Left probable UPJ obstruction.    6/6/2024: Diverting colostomy      7/25/2024: PET scan: Masslike thickening with associated FDG avidity throughout the rectum is consistent with given history.  Metastatic disease within the liver, retroperitoneal nodes, perirectal nodes and inguinal nodes.  Findings are concerning for a UPJ obstruction.  Nuclear medicine renal Lasix scan for further characterize.    7/24/2024 to 12/23/2024:12 cycles of FOLFOX    12/18/2024: PET scan:IMPRESSION: Favorable response to therapy. Interval resolution of  hypermetabolism associated with a focal metastatic disease to the  liver as was seen previously and interval resolution of  hypermetabolism within the rectal lesion.    Interim history:    Mr. Mcbride is doing well today.  He denies any worsening nausea or diarrhea. Denies abdominal pain. Denies any nausea or vomiting. Neuropathy at baseline. His bowels has prolapsed into ostomy. Ostomy is functioning.    REVIEW OF SYSTEMS  A 12-point ROS negative except as in HPI      Current Outpatient Medications   Medication Sig Dispense Refill    acetaminophen (TYLENOL) 500 MG  tablet Take 650 mg by mouth every 6 hours as needed for mild pain. (Patient not taking: Reported on 12/19/2024)      aspirin 81 MG EC tablet Take 81 mg by mouth daily      atorvastatin (LIPITOR) 20 MG tablet Take 20 mg by mouth daily      capecitabine (XELODA) 500 MG tablet Take 3 tablets (1,500 mg) by mouth 2 times daily for 14 days. Days 1 through 14, then off for 7 days.Take with water within 30 minutes after a meal. 84 tablet 0    glipiZIDE (GLUCOTROL XL) 2.5 MG 24 hr tablet Take 2.5 mg by mouth daily. Outside prescriber      hydrochlorothiazide (HYDRODIURIL) 25 MG tablet Take 25 mg by mouth daily      lidocaine-prilocaine (EMLA) 2.5-2.5 % external cream Apply topically daily as needed for moderate pain (Patient not taking: Reported on 12/19/2024) 30 g 1    lisinopril (ZESTRIL) 40 MG tablet Take 40 mg by mouth daily      loperamide (IMODIUM A-D) 2 MG tablet Take 1 tablet (2 mg) by mouth 4 times daily as needed for diarrhea (Patient not taking: Reported on 12/19/2024) 90 tablet 0    metFORMIN (GLUCOPHAGE-XR) 750 MG 24 hr tablet Take 500 mg by mouth 2 times daily (with meals).      multivitamin w/minerals (MULTI-VITAMIN) tablet Take 1 tablet by mouth daily      Oxymetazoline HCl (NASAL DECONGESTANT SPRAY NA) Spray in nostril as needed. (Patient not taking: Reported on 12/19/2024)      Polyethylene Glycol 3350 (MIRALAX PO) Take 1 Capful by mouth daily      traMADol (ULTRAM) 50 MG tablet Take 1 tablet (50 mg) by mouth every 6 hours as needed for severe pain (Patient not taking: Reported on 11/12/2024) 40 tablet 0    traZODone (DESYREL) 100 MG tablet Take 100 mg by mouth at bedtime. (Patient not taking: Reported on 11/12/2024)         No Known Allergies  Immunization History   Administered Date(s) Administered    COVID-19 Bivalent 12+ (Pfizer) 10/27/2022    COVID-19 Monovalent 18+ (Moderna) 01/16/2021, 02/13/2021, 11/29/2021    Hepatitis B, Adult 10/03/2013, 03/28/2014, 12/30/2014    Influenza Vaccine (Flucelvax  Quadrivalent) 10/07/2022    Pneumococcal 23 valent 10/03/2013    TDAP (Adacel,Boostrix) 10/03/2012       Past Medical History:   Diagnosis Date    Adolescent idiopathic scoliosis of thoracolumbar region 11/16/2017    Bunion 03/28/2013    Essential hypertension 03/05/2012    Formatting of this note might be different from the original.   Epic      Pure hypercholesterolemia 10/04/2012    Rectal cancer (H) 05/29/2024    Scoliosis     Severe protein-calorie malnutrition 06/07/2024    Type 2 diabetes mellitus without complication (H) 06/21/2012       Past Surgical History:   Procedure Laterality Date    CATARACT EXTRACTION W/ INTRAOCULAR LENS IMPLANT Left 2021    ENDOSCOPY N/A     upper and lower    GI SURGERY      PHACOEMULSIFICATION WITH STANDARD INTRAOCULAR LENS IMPLANT Right 09/29/2021    Procedure: right Cataract Extraction with Implant;  Surgeon: Juan Prado MD;  Location: [facility]    robotic colostomy N/A 06/06/2024       SOCIAL HISTORY  History   Smoking Status    Former    Types: Cigarettes   Smokeless Tobacco    Former    Social History    Substance and Sexual Activity      Alcohol use: Not Currently     History   Drug Use Unknown       FAMILY HISTORY  Family History   Problem Relation Age of Onset    Hypertension Mother     Pancreatic Cancer Father 55    Hypertension Sister     Other Problems  (knee replacement) Sister         bilateal knee replacement    Other Problems  (hip replacement) Sister         bilateral hip replacement    Diabetes No family hx of     Coronary Artery Disease No family hx of     Hyperlipidemia No family hx of     Cerebrovascular Disease No family hx of     Breast Cancer No family hx of     Colon Cancer No family hx of     Prostate Cancer No family hx of     Anesthesia Reaction No family hx of     Asthma No family hx of     Genetic Disorder No family hx of     Thyroid Disease No family hx of        PHYSICAL EXAMINATION  There were no vitals taken for this visit.  Wt Readings  from Last 2 Encounters:   12/23/24 81.5 kg (179 lb 10.8 oz)   12/19/24 80.3 kg (177 lb 0.5 oz)     Physical Exam  Constitutional:       Appearance: Normal appearance.   Abdominal:      General: Abdomen is flat.   Neurological:      General: No focal deficit present.      Mental Status: He is alert and oriented to person, place, and time.   Psychiatric:         Mood and Affect: Mood normal.     Laboratory and imaging:     Latest Reference Range & Units 01/08/25 08:42   WBC 4.0 - 11.0 10e3/uL 5.5   Hemoglobin 13.3 - 17.7 g/dL 12.0 (L)   Hematocrit 40.0 - 53.0 % 35.2 (L)   Platelet Count 150 - 450 10e3/uL 226   (L): Data is abnormally low  Foundation one: no reportable RNA variants EGFR amplification KRAS wildtype NRAS wildtype APC * ARIDIA * TP53 splice site 560-2A>G Diseas  e relevant genes with no reportable alterations: BRAF, ERBB2, KRAS, NRAS.    ASSESSMENT AND PLAN    1.  Stage IV rectal cancer: K-pastora wild-type, ANDI    Initially presented with diarrhea in March 2024.  Sigmoidoscopy showed rectal mass infiltrative partially obstructing large mass 8 cm from the anal verge.  MRI pelvis showed carcinoma involving lower, middle and upper rectum extending over a length of roughly 12 cm.  Lesion involves the mesorectum but most significant invasion anterolaterally on the right at the level of the mid rectum.  Neoplasm is contiguous with the posterior margin of the right seminal vesicle but is not clearly invading it.  Extends roughly 1.5 cm beyond the rectal margin.  It also showed numerous enlarged mesorectal lymph nodes.  A inferior mesenteric lymph node was also noted.  There were also enlarged left iliac nodes and right iliac node.    He has already had a diverting ostomy by  on 6/6/2024.    He was then found to have metastatic disease on PET scan.   PET scan showed 5 foci of abnormal avidity present within the liver, involvement of the common iliac lymph node, upper abdominal para-aortic lymph  node, right common iliac node and left perirectal node.  A few inguinal nodes are also avid.    He has completed 12 cycles of FOLFOX from 7/2024-12/ 2024.  PET scan 9/12/2024 showed very good response.  For his convenience as he was having difficulty with the 5-FU pump we made a decision to switch to oral capecitabine for maintenance.  He is yet to get his capecitabine medication.  He is overall doing well.  He will be meeting with his surgeon in February to see if they can reverse his ostomy.    We will see him back in 3 weeks based on when he starts his capecitabine with labs.    Total time spent on the patient on day of encounter was 41 minutes doing chart review, review of test results, interpretation of results, patient visit and documentation.       Shawna Poon MD

## 2025-01-08 NOTE — NURSING NOTE
"Oncology Rooming Note    January 8, 2025 9:18 AM   Tal Mcbride is a 69 year old male who presents for:    Chief Complaint   Patient presents with    Oncology Clinic Visit     Follow up Rectal cancer      Initial Vitals: BP (!) 140/80   Pulse 69   Temp 98.3  F (36.8  C) (Tympanic)   Resp 20   Ht 1.753 m (5' 9\")   Wt 80.7 kg (177 lb 14.6 oz)   SpO2 97%   BMI 26.27 kg/m   Estimated body mass index is 26.27 kg/m  as calculated from the following:    Height as of this encounter: 1.753 m (5' 9\").    Weight as of this encounter: 80.7 kg (177 lb 14.6 oz). Body surface area is 1.98 meters squared.  No Pain (0) Comment: Data Unavailable   No LMP for male patient.  Allergies reviewed: Yes  Medications reviewed: Yes    Medications: Medication refills not needed today.  Pharmacy name entered into pinion-pins:    [facility] - [place], [place] - [address] [facility] - [place], [place] - [address]    Frailty Screening:   Is the patient here for a new oncology consult visit in cancer care? 2. No            Hermila Fletcher LPN             "

## 2025-01-23 DIAGNOSIS — C20 RECTAL CANCER (H): Primary | ICD-10-CM

## 2025-01-23 RX ORDER — CAPECITABINE 500 MG/1
1500 TABLET, FILM COATED ORAL 2 TIMES DAILY
Qty: 84 TABLET | Refills: 0 | OUTPATIENT
Start: 2025-02-01 | End: 2025-02-15

## 2025-01-30 ENCOUNTER — LAB (OUTPATIENT)
Dept: ONCOLOGY | Facility: OTHER | Age: 70
End: 2025-01-30
Attending: NURSE PRACTITIONER
Payer: COMMERCIAL

## 2025-01-30 VITALS
DIASTOLIC BLOOD PRESSURE: 74 MMHG | SYSTOLIC BLOOD PRESSURE: 132 MMHG | HEART RATE: 60 BPM | OXYGEN SATURATION: 94 % | TEMPERATURE: 98.1 F | WEIGHT: 179.68 LBS | BODY MASS INDEX: 26.61 KG/M2 | HEIGHT: 69 IN

## 2025-01-30 DIAGNOSIS — D64.9 ANEMIA, UNSPECIFIED TYPE: Primary | ICD-10-CM

## 2025-01-30 DIAGNOSIS — T45.1X5A CHEMOTHERAPY-INDUCED NEUROPATHY: ICD-10-CM

## 2025-01-30 DIAGNOSIS — C20 RECTAL CANCER (H): ICD-10-CM

## 2025-01-30 DIAGNOSIS — G62.0 CHEMOTHERAPY-INDUCED NEUROPATHY: ICD-10-CM

## 2025-01-30 DIAGNOSIS — C20 RECTAL CANCER (H): Primary | ICD-10-CM

## 2025-01-30 DIAGNOSIS — D64.9 ANEMIA, UNSPECIFIED TYPE: ICD-10-CM

## 2025-01-30 LAB
ALBUMIN SERPL BCG-MCNC: 4.3 G/DL (ref 3.5–5.2)
ALP SERPL-CCNC: 98 U/L (ref 40–150)
ALT SERPL W P-5'-P-CCNC: 19 U/L (ref 0–70)
ANION GAP SERPL CALCULATED.3IONS-SCNC: 12 MMOL/L (ref 7–15)
AST SERPL W P-5'-P-CCNC: 24 U/L (ref 0–45)
BASOPHILS # BLD AUTO: 0.1 10E3/UL (ref 0–0.2)
BASOPHILS NFR BLD AUTO: 1 %
BILIRUB SERPL-MCNC: 0.5 MG/DL
BUN SERPL-MCNC: 24 MG/DL (ref 8–23)
CALCIUM SERPL-MCNC: 9.6 MG/DL (ref 8.8–10.4)
CEA SERPL-MCNC: 7.4 NG/ML
CHLORIDE SERPL-SCNC: 100 MMOL/L (ref 98–107)
CREAT SERPL-MCNC: 1.34 MG/DL (ref 0.67–1.17)
EGFRCR SERPLBLD CKD-EPI 2021: 57 ML/MIN/1.73M2
EOSINOPHIL # BLD AUTO: 0.4 10E3/UL (ref 0–0.7)
EOSINOPHIL NFR BLD AUTO: 4 %
ERYTHROCYTE [DISTWIDTH] IN BLOOD BY AUTOMATED COUNT: 14.6 % (ref 10–15)
FERRITIN SERPL-MCNC: 172 NG/ML (ref 31–409)
GLUCOSE SERPL-MCNC: 155 MG/DL (ref 70–99)
HCO3 SERPL-SCNC: 24 MMOL/L (ref 22–29)
HCT VFR BLD AUTO: 31.4 % (ref 40–53)
HGB BLD-MCNC: 10.5 G/DL (ref 13.3–17.7)
IMM GRANULOCYTES # BLD: 0 10E3/UL
IMM GRANULOCYTES NFR BLD: 0 %
IRON BINDING CAPACITY (ROCHE): 372 UG/DL (ref 240–430)
IRON SATN MFR SERPL: 15 % (ref 15–46)
IRON SERPL-MCNC: 56 UG/DL (ref 61–157)
LYMPHOCYTES # BLD AUTO: 2.4 10E3/UL (ref 0.8–5.3)
LYMPHOCYTES NFR BLD AUTO: 24 %
MCH RBC QN AUTO: 32.8 PG (ref 26.5–33)
MCHC RBC AUTO-ENTMCNC: 33.4 G/DL (ref 31.5–36.5)
MCV RBC AUTO: 98 FL (ref 78–100)
MONOCYTES # BLD AUTO: 0.9 10E3/UL (ref 0–1.3)
MONOCYTES NFR BLD AUTO: 9 %
NEUTROPHILS # BLD AUTO: 6.3 10E3/UL (ref 1.6–8.3)
NEUTROPHILS NFR BLD AUTO: 62 %
NRBC # BLD AUTO: 0 10E3/UL
NRBC BLD AUTO-RTO: 0 /100
PLATELET # BLD AUTO: 225 10E3/UL (ref 150–450)
POTASSIUM SERPL-SCNC: 4.2 MMOL/L (ref 3.4–5.3)
PROT SERPL-MCNC: 7.1 G/DL (ref 6.4–8.3)
RBC # BLD AUTO: 3.2 10E6/UL (ref 4.4–5.9)
SODIUM SERPL-SCNC: 136 MMOL/L (ref 135–145)
VIT B12 SERPL-MCNC: 379 PG/ML (ref 232–1245)
WBC # BLD AUTO: 10.1 10E3/UL (ref 4–11)

## 2025-01-30 PROCEDURE — 85048 AUTOMATED LEUKOCYTE COUNT: CPT | Mod: ZL

## 2025-01-30 PROCEDURE — 36415 COLL VENOUS BLD VENIPUNCTURE: CPT

## 2025-01-30 PROCEDURE — 84155 ASSAY OF PROTEIN SERUM: CPT | Mod: ZL

## 2025-01-30 PROCEDURE — 83540 ASSAY OF IRON: CPT | Mod: ZL

## 2025-01-30 PROCEDURE — G0463 HOSPITAL OUTPT CLINIC VISIT: HCPCS

## 2025-01-30 PROCEDURE — 84295 ASSAY OF SERUM SODIUM: CPT | Mod: ZL

## 2025-01-30 PROCEDURE — 82607 VITAMIN B-12: CPT | Mod: ZL

## 2025-01-30 PROCEDURE — 82728 ASSAY OF FERRITIN: CPT | Mod: ZL

## 2025-01-30 PROCEDURE — 82378 CARCINOEMBRYONIC ANTIGEN: CPT | Mod: ZL

## 2025-01-30 PROCEDURE — 83550 IRON BINDING TEST: CPT | Mod: ZL

## 2025-01-30 PROCEDURE — 85004 AUTOMATED DIFF WBC COUNT: CPT | Mod: ZL

## 2025-01-30 ASSESSMENT — PAIN SCALES - GENERAL: PAINLEVEL_OUTOF10: MILD PAIN (3)

## 2025-01-30 NOTE — NURSING NOTE
"Oncology Rooming Note    January 30, 2025 8:56 AM   Tal Mcbride is a 69 year old male who presents for:    Chief Complaint   Patient presents with    Oncology Clinic Visit     Follow up. Rectal cancer      Initial Vitals: /74 (BP Location: Right arm, Patient Position: Sitting, Cuff Size: Adult Regular)   Pulse 60   Temp 98.1  F (36.7  C) (Tympanic)   Ht 1.753 m (5' 9\")   Wt 81.5 kg (179 lb 10.8 oz)   SpO2 94%   BMI 26.53 kg/m   Estimated body mass index is 26.53 kg/m  as calculated from the following:    Height as of this encounter: 1.753 m (5' 9\").    Weight as of this encounter: 81.5 kg (179 lb 10.8 oz). Body surface area is 1.99 meters squared.  Mild Pain (3) Comment: Data Unavailable   No LMP for male patient.  Allergies reviewed: Yes  Medications reviewed: Yes    Medications: Medication refills not needed today.  Pharmacy name entered into [employer]:    [facility] - [place], [place] - [address] [facility] - [place], [place] - [address]    Frailty Screening:   Is the patient here for a new oncology consult visit in cancer care? 2. No      Clinical concerns: having more rectal pain the last month or so, [place], thinks that may have provoked this pain but the pain started a week or so after his fall. Having mucus in his stool which has been brownish in color, noticed pinkish colored blood in his stools off and on the last couple weeks.   Carolina Miller  was notified.      Larissa Whiteside, CINTHYA              "

## 2025-01-30 NOTE — PROGRESS NOTES
Oncology Follow-up Visit:  January 30, 2025    Reason for Visit:  Patient presents with:  Oncology Clinic Visit: Follow up. Rectal cancer      Nursing Note and documentation reviewed: yes    HPI: This is a 69-year-old male patient who presents to the oncology clinic today for evaluation prior to receiving cycle 2 therapy for metastatic rectal cancer diagnosed April 2024.  He underwent diverting ostomy followed by 12 cycles of FOLFOX completing this in December 2024.  He was then placed on oral capecitabine for maintenance.     He presents to the clinic today accompanied by his wife.  Patient states he is having more pain in the rectal area for what he states has been over the past week or 2.  Wife states she feels it has been longer, maybe the past month.  He has been sitting on his hip in order to not put pressure on the rectum due to the discomfort.  He states it feels as though he needs to have a bowel movement.  He also describes having ongoing mucus per the rectum but has actually had some stool and blood through the rectum also recently.  He is not taking anything for pain.  Note patient states he did fall twice on his butt right around New Year's and never had any discomfort after this.  He also states he had a table pushed into his belly and had some discomfort around the stoma for short time and this was about a week ago.  He does not feel that the herniation of the bowel is any larger than it has been but wife feels that it is.  He empties the bag usually twice a day and does watch what he eats.  He feels his appetite is good but he is not eating a lot and wife is concerned.  He does have some discomfort at times when he coughs feeling it in the stoma area.    He also is dealing with some neuropathy in his feet and in his hands.  It is consistent and does not go away.  He states it is not painful and he is not having issues with fine motor skills but did have trouble opening a can of soup the other  day.    He'll be undergoing an ERUS and colonoscopy by Dr. Lau on 2/11/2025 per recommnedation of Dr. Cordova and she plans to call with results.    Oncologic History:     Mr. Mcbride started having diarrhea in the end of March 2024.  He was evaluated by his primary care and recommended to undergo a colonoscopy and upper endoscopy.     4/29/2024: He underwent EGD by Dr. Lim: 3 cm hiatal hernia.  Normal stomach.  Normal third portion of the duodenum.  Nonobstructing Schatzki's ring.     4/29/2024: He underwent sigmoidoscopy by Dr. Lim: The digital rectal exam revealed a soft tissue mass palpated 8 cm from the anal verge.  The mass was noncircumferential and located predominantly at the posterior bowel wall.  An infiltrative partially obstructing large mass was found in the rectum.  The mass was circumferential.  The mass measured 4 cm in length.  No bleeding was present.  This was biopsied.  A pediatric colonoscope was passed past the area but patient had a large quantity of stool present just above the stricture so colonoscopy was not able to be completed.     Biopsy of the rectal mass showed moderately to poorly differentiated adenocarcinoma.  [facility] [place] chest with contrast: No definite findings for metastatic disease in the chest. 2 small pulmonary nodules measuring up to 4 mm unchanged from prior study      5/2/2024: MRI pelvis with and without contrast: A carcinoma involving the lower, middle and upper rectum extending over a length of roughly 12 cm demonstrated.  This lesion involves the mesorectum with no significant invasion anterolaterally on the right at the level of the mid rectum.  At this site infiltrative neoplasm is contactless with the posterior margin of the right seminal vesicle but it is not clearly invaded.  Neoplasm extends roughly 1.5 cm beyond the rectal margin.  Numerous enlarged mesorectal lymph nodes are demonstrated.  The larger mesorectal lymph node is demonstrated on the  right measuring 1.8X 1.5 cm.  Additional mesorectal lymph nodes with short axis measurements at least 1 cm demonstrated.  Inferior mesenteric lymph nodes with short axis measurement of 1.1 cm X 1.0 cm are noted.  A left internal iliac node measuring 1.2X 0.8 cm is demonstrated.  A 2.7 X1.8 centimeter right iliac node is demonstrated.  The other visualized bowel is unremarkable.  The prostate is mildly enlarged.  No free fluid is demonstrated.     5/13/2024: CT abdomen and pelvis without contrast: Large infiltrative rectal mass involving the upper middle and lower rectum.  There is surrounding inflammatory change and pelvic free fluid.  There is adjacent mesorectal adenopathy, a left internal iliac node as well.  Large amount of stool throughout the colon.  Enlarged prostate gland.  Left probable UPJ obstruction.    5/29/2024 patient was seen in consultation with by Dr. Paula Cordova with [facility] surgery.  Plan was to undergo diverting loop colostomy due to obstructive symptoms and it was felt he likely would not make it through neoadjuvant therapy prior to obstructing.     6/6/2024: Robotic diverting colostomy by Dr. Cordova     7/3/2024 he was seen by Dr. Cleaning with radiation oncology and they discussed neoadjuvant therapy with chemotherapy followed by radiation and then chemotherapy again followed by surgery    7/17/2024 he was seen by Dr. Poon with medical oncology in consultation.  She agreed with total neoadjuvant chemotherapy with initiation of chemotherapy initially followed by radiation therapy.  Plan was to initiate FOLFOX x 8 cycles then refer back to radiation oncology for concurrent chemo RT     7/25/2024: PET scan: Masslike thickening with associated FDG avidity throughout the rectum is consistent with given history.  Metastatic disease within the liver, retroperitoneal nodes, perirectal nodes and inguinal nodes.  Findings are concerning for a UPJ obstruction.  Nuclear medicine renal Lasix scan  for further characterize.     7/24/2024 to 12/23/2024:12 cycles of FOLFOX     12/18/2024: PET scan:IMPRESSION: Favorable response to therapy. Interval resolution of hypermetabolism associated with a focal metastatic disease to the liver as was seen previously and interval resolution of hypermetabolism within the rectal lesion.    Foundation one: no reportable RNA variants EGFR amplification KRAS wildtype NRAS wildtype APC * ARIDIA * TP53 splice site [id number]>G Diseas  e relevant genes with no reportable alterations: BRAF, ERBB2, KRAS, NRAS.       Current Chemo Regime/TX: Capecitabine 1500 mg by mouth 2 times daily days 1 through 14 then off for 7 days with cycle given every 21 days.  Current Cycle:  2  # of completed cycles:  1    Previous treatment: Modified FOLFOX x 12 cycles completed December 2024    Past Medical History:   Diagnosis Date    Adolescent idiopathic scoliosis of thoracolumbar region 11/16/2017    Bunion 03/28/2013    Essential hypertension 03/05/2012    Formatting of this note might be different from the original.   Epic      Pure hypercholesterolemia 10/04/2012    Rectal cancer (H) 05/29/2024    Scoliosis     Severe protein-calorie malnutrition 06/07/2024    Type 2 diabetes mellitus without complication (H) 06/21/2012       Past Surgical History:   Procedure Laterality Date    CATARACT EXTRACTION W/ INTRAOCULAR LENS IMPLANT Left 2021    ENDOSCOPY N/A     upper and lower    GI SURGERY      PHACOEMULSIFICATION WITH STANDARD INTRAOCULAR LENS IMPLANT Right 09/29/2021    Procedure: right Cataract Extraction with Implant;  Surgeon: Juan Prado MD;  Location: [facility] OR    robotic colostomy N/A 06/06/2024       Family History   Problem Relation Age of Onset    Hypertension Mother     Pancreatic Cancer Father 55    Hypertension Sister     Other Problems  (knee replacement) Sister         bilateal knee replacement    Other Problems  (hip replacement) Sister         bilateral hip replacement     Diabetes No family hx of     Coronary Artery Disease No family hx of     Hyperlipidemia No family hx of     Cerebrovascular Disease No family hx of     Breast Cancer No family hx of     Colon Cancer No family hx of     Prostate Cancer No family hx of     Anesthesia Reaction No family hx of     Asthma No family hx of     Genetic Disorder No family hx of     Thyroid Disease No family hx of        Social History     Socioeconomic History    Marital status:      Spouse name: Gayle    Number of children: 0    Years of education: Not on file    Highest education level: Not on file   Occupational History    Occupation: worked at [employer]     Comment: on medical leave    Occupation:      Comment: retired   Tobacco Use    Smoking status: Former     Current packs/day: 0.00     Average packs/day: 0.5 packs/day for 15.0 years (7.5 ttl pk-yrs)     Types: Cigarettes     Start date:      Quit date:      Years since quittin.1    Smokeless tobacco: Former    Tobacco comments:     Chewed for about 15 years when he smoked.    Vaping Use    Vaping status: Never Used   Substance and Sexual Activity    Alcohol use: Not Currently    Drug use: Never    Sexual activity: Not on file   Other Topics Concern    Parent/sibling w/ CABG, MI or angioplasty before 65F 55M? No   Social History Narrative    Not on file     Social Drivers of Health     Financial Resource Strain: Not on file   Food Insecurity: No Food Insecurity (2024)    Received from [facility] and [facility]    Hunger Vital Sign     Worried About Running Out of Food in the Last Year: Never true     Ran Out of Food in the Last Year: Never true   Transportation Needs: No Transportation Needs (2024)    Received from [facility] and [facility]    PRAPARE - Transportation     Lack of Transportation (Medical): No     Lack of Transportation (Non-Medical): No   Physical Activity: Not on file   Stress:  Not on file   Social Connections: Not on file   Interpersonal Safety: Low Risk  (1/30/2025)    Interpersonal Safety     Do you feel physically and emotionally safe where you currently live?: Yes     Within the past 12 months, have you been hit, slapped, kicked or otherwise physically hurt by someone?: No     Within the past 12 months, have you been humiliated or emotionally abused in other ways by your partner or ex-partner?: No   Housing Stability: High Risk (6/6/2024)    Received from [facility] and [facility]    Housing Stability Vital Sign     Unable to Pay for Housing in the Last Year: No     Number of Times Moved in the Last Year: 2     Homeless in the Last Year: No       Current Outpatient Medications   Medication Sig Dispense Refill    aspirin 81 MG EC tablet Take 81 mg by mouth daily      atorvastatin (LIPITOR) 20 MG tablet Take 20 mg by mouth daily      glipiZIDE (GLUCOTROL XL) 2.5 MG 24 hr tablet Take 2.5 mg by mouth daily. Outside prescriber      hydrochlorothiazide (HYDRODIURIL) 25 MG tablet Take 25 mg by mouth daily      lisinopril (ZESTRIL) 40 MG tablet Take 40 mg by mouth daily      metFORMIN (GLUCOPHAGE-XR) 750 MG 24 hr tablet Take 500 mg by mouth 2 times daily (with meals).      multivitamin w/minerals (MULTI-VITAMIN) tablet Take 1 tablet by mouth daily      Oxymetazoline HCl (NASAL DECONGESTANT SPRAY NA) Spray in nostril as needed.      Polyethylene Glycol 3350 (MIRALAX PO) Take 1 Capful by mouth daily      acetaminophen (TYLENOL) 500 MG tablet Take 650 mg by mouth every 6 hours as needed for mild pain. (Patient not taking: Reported on 1/30/2025)      [START ON 2/1/2025] capecitabine (XELODA) 500 MG tablet Take 3 tablets (1,500 mg) by mouth 2 times daily for 14 days. Days 1 through 14, then off for 7 days.Take with water within 30 minutes after a meal. 84 tablet 0    lidocaine-prilocaine (EMLA) 2.5-2.5 % external cream Apply topically daily as needed for moderate pain (Patient  "not taking: Reported on 12/19/2024) 30 g 1    loperamide (IMODIUM A-D) 2 MG tablet Take 1 tablet (2 mg) by mouth 4 times daily as needed for diarrhea (Patient not taking: Reported on 12/19/2024) 90 tablet 0    traMADol (ULTRAM) 50 MG tablet Take 1 tablet (50 mg) by mouth every 6 hours as needed for severe pain (Patient not taking: Reported on 11/12/2024) 40 tablet 0    traZODone (DESYREL) 100 MG tablet Take 100 mg by mouth at bedtime. (Patient not taking: Reported on 11/12/2024)       No current facility-administered medications for this visit.     Facility-Administered Medications Ordered in Other Visits   Medication Dose Route Frequency Provider Last Rate Last Admin    sodium chloride (PF) 0.9% PF flush 3-20 mL  3-20 mL Intracatheter q1 min prn Shawna Poon MD            No Known Allergies    Review Of Systems:  Constitutional:    denies fever, weight changes, chills  Eyes:    denies blurred or double vision  Ears/Nose/Throat:   denies ear pain, nose problems, difficulty swallowing  Respiratory:   denies shortness of breath, cough  Cardiovascular:   denies chest pain, palpitations  Gastrointestinal:   see hpi  Genitourinary:   denies difficulty with urination, blood in urine  Musculoskeletal:    denies new muscle pain, bone pain  Neurologic:   see hpi  Psychiatric:   wife expresses concern re: patient being down  Hematologic/Lymphatic/Immunologic:   denies easy bruising, easy bleeding, lumps or bumps noted  Endocrine:   Denies increased thirst      ECOG Performance Status: 1    Physical Exam:  /74 (BP Location: Right arm, Patient Position: Sitting, Cuff Size: Adult Regular)   Pulse 60   Temp 98.1  F (36.7  C) (Tympanic)   Ht 1.753 m (5' 9\")   Wt 81.5 kg (179 lb 10.8 oz)   SpO2 94%   BMI 26.53 kg/m      GENERAL APPEARANCE: Healthy, alert and in no acute distress.  HEENT: Normocephalic, Sclerae anicteric. No obvious oral lesions or thrush  NECK:   No asymmetry or masses, no thyromegaly.  LYMPHATICS: No " palpable cervical, supraclavicular, axillary, or inguinal nodes   RESP: Lungs clear to auscultation bilaterally, respirations regular and easy  CARDIOVASCULAR: Regular rate and rhythm. Normal S1, S2  ABDOMEN: Soft, nontender. Bowel sounds auscultated all 4 quadrants. No palpable organomegaly or masses.  Large herniation of bowel into colostomy bag  MUSCULOSKELETAL: Extremities without gross deformities noted.   SKIN: No suspicious lesions or rashes to exposed skin  NEURO: Alert and oriented x 3.  Gait steady.  PSYCHIATRIC: Mentation and affect appear normal.  Mood appropriate.    Laboratory:  Results for orders placed or performed in visit on 01/30/25   Comprehensive metabolic panel     Status: Abnormal   Result Value Ref Range    Sodium 136 135 - 145 mmol/L    Potassium 4.2 3.4 - 5.3 mmol/L    Carbon Dioxide (CO2) 24 22 - 29 mmol/L    Anion Gap 12 7 - 15 mmol/L    Urea Nitrogen 24.0 (H) 8.0 - 23.0 mg/dL    Creatinine 1.34 (H) 0.67 - 1.17 mg/dL    GFR Estimate 57 (L) >60 mL/min/1.73m2    Calcium 9.6 8.8 - 10.4 mg/dL    Chloride 100 98 - 107 mmol/L    Glucose 155 (H) 70 - 99 mg/dL    Alkaline Phosphatase 98 40 - 150 U/L    AST 24 0 - 45 U/L    ALT 19 0 - 70 U/L    Protein Total 7.1 6.4 - 8.3 g/dL    Albumin 4.3 3.5 - 5.2 g/dL    Bilirubin Total 0.5 <=1.2 mg/dL   CBC with platelets and differential     Status: Abnormal   Result Value Ref Range    WBC Count 10.1 4.0 - 11.0 10e3/uL    RBC Count 3.20 (L) 4.40 - 5.90 10e6/uL    Hemoglobin 10.5 (L) 13.3 - 17.7 g/dL    Hematocrit 31.4 (L) 40.0 - 53.0 %    MCV 98 78 - 100 fL    MCH 32.8 26.5 - 33.0 pg    MCHC 33.4 31.5 - 36.5 g/dL    RDW 14.6 10.0 - 15.0 %    Platelet Count 225 150 - 450 10e3/uL    % Neutrophils 62 %    % Lymphocytes 24 %    % Monocytes 9 %    % Eosinophils 4 %    % Basophils 1 %    % Immature Granulocytes 0 %    NRBCs per 100 WBC 0 <1 /100    Absolute Neutrophils 6.3 1.6 - 8.3 10e3/uL    Absolute Lymphocytes 2.4 0.8 - 5.3 10e3/uL    Absolute Monocytes 0.9  0.0 - 1.3 10e3/uL    Absolute Eosinophils 0.4 0.0 - 0.7 10e3/uL    Absolute Basophils 0.1 0.0 - 0.2 10e3/uL    Absolute Immature Granulocytes 0.0 <=0.4 10e3/uL    Absolute NRBCs 0.0 10e3/uL   CBC with platelets differential     Status: Abnormal    Narrative    The following orders were created for panel order CBC with platelets differential.  Procedure                               Abnormality         Status                     ---------                               -----------         ------                     CBC with platelets and d...[[id number]]  Abnormal            Final result                 Please view results for these tests on the individual orders.       Imaging Studies: None completed for today's visit      ASSESSMENT/PLAN:    #1 Rectal cancer: Stage IV rectal cancer, K-pastora wild-type, ANDI diagnosed April 2024.  He underwent diverting ostomy followed by 12 cycles of FOLFOX completing this in December 2024.  He was then placed on oral capecitabine for maintenance.  He has completed 1 cycle thus far.  Patient is having potential symptoms of recurrence.  Will hold off on starting cycle 2 therapy until after discussion with Dr. Cordova regarding potential imaging.    I encouraged patient to call with any questions or concerns.    60 minutes spent in the patient's encounter today with time spent in review of the chart along with in chart preparation.  Time was also spent in review of his treatment plan.  Time was also spent obtaining a review of systems and performing a physical exam along with review of his labwork.  Time was spent in lengthy discussion of symptoms and my recommendations.  Time was also spent in attempt to reach out to Dr. Cordova.  Time was spent in placing orders, discussing plan and in chart documentation.  Khushboo Miller, NP  APRN, FNP-BC, AOCNP

## 2025-02-03 ENCOUNTER — HOSPITAL ENCOUNTER (EMERGENCY)
Facility: HOSPITAL | Age: 70
Discharge: HOME | End: 2025-02-03
Attending: STUDENT IN AN ORGANIZED HEALTH CARE EDUCATION/TRAINING PROGRAM
Payer: COMMERCIAL

## 2025-02-03 VITALS
RESPIRATION RATE: 18 BRPM | SYSTOLIC BLOOD PRESSURE: 148 MMHG | OXYGEN SATURATION: 97 % | TEMPERATURE: 97.7 F | DIASTOLIC BLOOD PRESSURE: 90 MMHG | HEART RATE: 84 BPM

## 2025-02-03 DIAGNOSIS — K62.5 RECTAL BLEEDING: ICD-10-CM

## 2025-02-03 LAB
ANION GAP SERPL CALCULATED.3IONS-SCNC: 13 MMOL/L (ref 7–15)
BUN SERPL-MCNC: 20.3 MG/DL (ref 8–23)
CALCIUM SERPL-MCNC: 10.4 MG/DL (ref 8.8–10.4)
CHLORIDE SERPL-SCNC: 99 MMOL/L (ref 98–107)
CREAT SERPL-MCNC: 1.3 MG/DL (ref 0.67–1.17)
EGFRCR SERPLBLD CKD-EPI 2021: 59 ML/MIN/1.73M2
ERYTHROCYTE [DISTWIDTH] IN BLOOD BY AUTOMATED COUNT: 14.7 % (ref 10–15)
GLUCOSE SERPL-MCNC: 128 MG/DL (ref 70–99)
HCO3 SERPL-SCNC: 27 MMOL/L (ref 22–29)
HCT VFR BLD AUTO: 35.3 % (ref 40–53)
HGB BLD-MCNC: 12 G/DL (ref 13.3–17.7)
HOLD SPECIMEN: NORMAL
MCH RBC QN AUTO: 33.2 PG (ref 26.5–33)
MCHC RBC AUTO-ENTMCNC: 34 G/DL (ref 31.5–36.5)
MCV RBC AUTO: 98 FL (ref 78–100)
PLATELET # BLD AUTO: 259 10E3/UL (ref 150–450)
POTASSIUM SERPL-SCNC: 4.1 MMOL/L (ref 3.4–5.3)
RBC # BLD AUTO: 3.61 10E6/UL (ref 4.4–5.9)
SODIUM SERPL-SCNC: 139 MMOL/L (ref 135–145)
WBC # BLD AUTO: 8.6 10E3/UL (ref 4–11)

## 2025-02-03 PROCEDURE — 82565 ASSAY OF CREATININE: CPT | Performed by: STUDENT IN AN ORGANIZED HEALTH CARE EDUCATION/TRAINING PROGRAM

## 2025-02-03 PROCEDURE — 36415 COLL VENOUS BLD VENIPUNCTURE: CPT | Performed by: STUDENT IN AN ORGANIZED HEALTH CARE EDUCATION/TRAINING PROGRAM

## 2025-02-03 PROCEDURE — 99283 EMERGENCY DEPT VISIT LOW MDM: CPT | Performed by: STUDENT IN AN ORGANIZED HEALTH CARE EDUCATION/TRAINING PROGRAM

## 2025-02-03 PROCEDURE — 99283 EMERGENCY DEPT VISIT LOW MDM: CPT

## 2025-02-03 PROCEDURE — 85049 AUTOMATED PLATELET COUNT: CPT | Performed by: STUDENT IN AN ORGANIZED HEALTH CARE EDUCATION/TRAINING PROGRAM

## 2025-02-03 ASSESSMENT — COLUMBIA-SUICIDE SEVERITY RATING SCALE - C-SSRS
6. HAVE YOU EVER DONE ANYTHING, STARTED TO DO ANYTHING, OR PREPARED TO DO ANYTHING TO END YOUR LIFE?: NO
1. IN THE PAST MONTH, HAVE YOU WISHED YOU WERE DEAD OR WISHED YOU COULD GO TO SLEEP AND NOT WAKE UP?: NO
2. HAVE YOU ACTUALLY HAD ANY THOUGHTS OF KILLING YOURSELF IN THE PAST MONTH?: NO

## 2025-02-03 ASSESSMENT — ACTIVITIES OF DAILY LIVING (ADL): ADLS_ACUITY_SCORE: 41

## 2025-02-03 NOTE — ED PROVIDER NOTES
History     Chief Complaint   Patient presents with    Rectal Bleeding     HPI  Tal Mcbride is a 69 year old male with PMH colorectal cancer s/p diverting loop ileostomy, planned for surgical intervention in 1 week, post-chemotherapy, DM2, HTN, presenting with rectal bleeding. He has had some intermittent rectal bleeding over the past 3 weeks. No blood in stoma. Sometimes he has dark/red bleeding, and then sometimes he will have small amounts of normal stool without blood or darkness. He had the ileostomy due to 85% blockage secondary to the colorectal mass. When he wipes he notices blood as well sometimes. He chronically has had mucous in his stool for years, and notes a mix sometimes. He does feel fatigued but associates this with chemotherapy. No lightheadedness. No SOB. No CP. No N/V. No hx of GI bleed otherwise. Not on blood thinners. No fevers recently.    Allergies:  No Known Allergies    Problem List:    Patient Active Problem List    Diagnosis Date Noted    Severe protein-calorie malnutrition 06/07/2024     Priority: Medium    Rectal cancer (H) 05/29/2024     Priority: Medium    Adolescent idiopathic scoliosis of thoracolumbar region 11/16/2017     Priority: Medium    Bunion 03/28/2013     Priority: Medium    Pure hypercholesterolemia 10/04/2012     Priority: Medium    Type 2 diabetes mellitus without complication (H) 06/21/2012     Priority: Medium    Essential hypertension 03/05/2012     Priority: Medium     Formatting of this note might be different from the original.   Epic          Past Medical History:    Past Medical History:   Diagnosis Date    Adolescent idiopathic scoliosis of thoracolumbar region 11/16/2017    Bunion 03/28/2013    Essential hypertension 03/05/2012    Pure hypercholesterolemia 10/04/2012    Rectal cancer (H) 05/29/2024    Scoliosis     Severe protein-calorie malnutrition 06/07/2024    Type 2 diabetes mellitus without complication (H) 06/21/2012       Past Surgical History:     Past Surgical History:   Procedure Laterality Date    CATARACT EXTRACTION W/ INTRAOCULAR LENS IMPLANT Left     ENDOSCOPY N/A     upper and lower    GI SURGERY      PHACOEMULSIFICATION WITH STANDARD INTRAOCULAR LENS IMPLANT Right 2021    Procedure: right Cataract Extraction with Implant;  Surgeon: Juan Prado MD;  Location: [facility]    robotic colostomy N/A 2024       Family History:    Family History   Problem Relation Age of Onset    Hypertension Mother     Pancreatic Cancer Father 55    Hypertension Sister     Other Problems  (knee replacement) Sister         bilateal knee replacement    Other Problems  (hip replacement) Sister         bilateral hip replacement    Diabetes No family hx of     Coronary Artery Disease No family hx of     Hyperlipidemia No family hx of     Cerebrovascular Disease No family hx of     Breast Cancer No family hx of     Colon Cancer No family hx of     Prostate Cancer No family hx of     Anesthesia Reaction No family hx of     Asthma No family hx of     Genetic Disorder No family hx of     Thyroid Disease No family hx of        Social History:  Marital Status:   [2]  Social History     Tobacco Use    Smoking status: Former     Current packs/day: 0.00     Average packs/day: 0.5 packs/day for 15.0 years (7.5 ttl pk-yrs)     Types: Cigarettes     Start date:      Quit date:      Years since quittin.1    Smokeless tobacco: Former    Tobacco comments:     Chewed for about 15 years when he smoked.    Vaping Use    Vaping status: Never Used   Substance Use Topics    Alcohol use: Not Currently    Drug use: Never        Medications:    acetaminophen (TYLENOL) 500 MG tablet  aspirin 81 MG EC tablet  atorvastatin (LIPITOR) 20 MG tablet  capecitabine (XELODA) 500 MG tablet  glipiZIDE (GLUCOTROL XL) 2.5 MG 24 hr tablet  hydrochlorothiazide (HYDRODIURIL) 25 MG tablet  lidocaine-prilocaine (EMLA) 2.5-2.5 % external cream  lisinopril (ZESTRIL) 40 MG  tablet  loperamide (IMODIUM A-D) 2 MG tablet  metFORMIN (GLUCOPHAGE-XR) 750 MG 24 hr tablet  multivitamin w/minerals (MULTI-VITAMIN) tablet  Oxymetazoline HCl (NASAL DECONGESTANT SPRAY NA)  Polyethylene Glycol 3350 (MIRALAX PO)  traMADol (ULTRAM) 50 MG tablet  traZODone (DESYREL) 100 MG tablet          Review of Systems   All other systems reviewed and are negative.      Physical Exam   BP: (!) 155/98  Pulse: 80  Temp: 97.7  F (36.5  C)  Resp: 16  SpO2: 98 %      Physical Exam  Vitals and nursing note reviewed.   Constitutional:       General: He is not in acute distress.     Appearance: Normal appearance. He is not ill-appearing, toxic-appearing or diaphoretic.   HENT:      Head: Normocephalic and atraumatic.      Right Ear: External ear normal.      Left Ear: External ear normal.      Nose: Nose normal.      Mouth/Throat:      Mouth: Mucous membranes are moist.   Eyes:      Extraocular Movements: Extraocular movements intact.      Pupils: Pupils are equal, round, and reactive to light.   Cardiovascular:      Rate and Rhythm: Normal rate and regular rhythm.      Pulses: Normal pulses.      Heart sounds: Normal heart sounds.   Pulmonary:      Effort: Pulmonary effort is normal.      Breath sounds: Normal breath sounds.   Abdominal:      General: Abdomen is flat.      Palpations: Abdomen is soft.      Comments: Large loop ostomy noted. Tissue is not dusky or gray. Red. Well appearing. Reducible.   Musculoskeletal:         General: Normal range of motion.      Cervical back: Normal range of motion.   Skin:     General: Skin is warm.      Capillary Refill: Capillary refill takes less than 2 seconds.   Neurological:      General: No focal deficit present.      Mental Status: He is alert and oriented to person, place, and time.         ED Course        Procedures              Results for orders placed or performed during the hospital encounter of 02/03/25 (from the past 24 hours)   CBC with platelets   Result Value Ref  Range    WBC Count 8.6 4.0 - 11.0 10e3/uL    RBC Count 3.61 (L) 4.40 - 5.90 10e6/uL    Hemoglobin 12.0 (L) 13.3 - 17.7 g/dL    Hematocrit 35.3 (L) 40.0 - 53.0 %    MCV 98 78 - 100 fL    MCH 33.2 (H) 26.5 - 33.0 pg    MCHC 34.0 31.5 - 36.5 g/dL    RDW 14.7 10.0 - 15.0 %    Platelet Count 259 150 - 450 10e3/uL   Basic metabolic panel   Result Value Ref Range    Sodium 139 135 - 145 mmol/L    Potassium 4.1 3.4 - 5.3 mmol/L    Chloride 99 98 - 107 mmol/L    Carbon Dioxide (CO2) 27 22 - 29 mmol/L    Anion Gap 13 7 - 15 mmol/L    Urea Nitrogen 20.3 8.0 - 23.0 mg/dL    Creatinine 1.30 (H) 0.67 - 1.17 mg/dL    GFR Estimate 59 (L) >60 mL/min/1.73m2    Calcium 10.4 8.8 - 10.4 mg/dL    Glucose 128 (H) 70 - 99 mg/dL   Extra Tube    Narrative    The following orders were created for panel order Extra Tube.  Procedure                               Abnormality         Status                     ---------                               -----------         ------                     Extra Blue Top Tube[[id number]]                              In process                 Extra Red Top Tube[[id number]]                               In process                 Extra Heparinized Syringe[[id number]]                        In process                   Please view results for these tests on the individual orders.       Medications - No data to display    Assessments & Plan (with Medical Decision Making)     I have reviewed the nursing notes.    Small amount of rectal bleeding over 3 weeks. No symptoms of anemia besides fatigue which are attributable to a number of other etiologies as well. No evidence of infection. No hx of GI bleed. Not on blood thinners. Hemoglobin obtained and stable. Offered Cta GI bleed vs watchful waiting as he is already scheduled for a colonoscopy. Patient and family prefer to wait. Given his well appearance, stable vitals, reasonable to wait. No current rectal bleeding so exam deferred. If bleeding worsens, should  return for further testing.    Findings were discussed with the patient including non-emergent imaging/lab results. Additional verbal instructions were discussed with the patient as well. Instructed to follow up with a primary care provider within 5 days. Also discussed specific warning signs and instructed to return to the ED if there are any concerns. Patient voiced understanding of instructions, questions were answered and the patient was discharged home in stable condition.    I have reviewed the findings, diagnosis, plan and need for follow up with the patient.      New Prescriptions    No medications on file       Final diagnoses:   Rectal bleeding       2/3/2025   [facility]       Chuy Perez MD  02/03/25 [id number]

## 2025-02-03 NOTE — ED TRIAGE NOTES
Pt presents with just getting done with infusions of chemo end of December  and now started chemo pills.  Started with rectal bleeding about 5 days ago.   Reports its dark and bright red.  Pt does have a stoma.  Pt is also having some discharge from the rectum and spouse reports its foul smelling.  Reports when he wipes its bloody and usually never has any discharge.  There is also mucus mixed within   pt reports he feels a little more run down then usual  They did contact Dr. Poon and suggested they come in.

## 2025-02-03 NOTE — ED NOTES
Patient discharged from ED. AVS reviewed and discussed with patient who verbalizes understanding. Denies additional questions. Ambulatory.

## 2025-02-03 NOTE — DISCHARGE INSTRUCTIONS
- Please return to the Emergency Room if you do not improve, feel worse, or have any new or concerning symptoms. We would especially want to see you back if you experience recurrent/worsening bleeding or vomiting with blood.  - Please follow up with a primary care physician in 4-5 days to discuss any further testing or treatment.

## 2025-02-17 DIAGNOSIS — G89.3 CANCER RELATED PAIN: Primary | ICD-10-CM

## 2025-02-17 RX ORDER — OXYCODONE AND ACETAMINOPHEN 5; 325 MG/1; MG/1
1 TABLET ORAL EVERY 4 HOURS PRN
Qty: 90 TABLET | Refills: 0 | Status: SHIPPED | OUTPATIENT
Start: 2025-02-17

## 2025-02-17 NOTE — CONFIDENTIAL NOTE
Patient was called in regards to his follow up with Dr. June. He is scheduled for a follow up on 3-4*25 virtual. He reports that we have to do something for his pain. He reports that he is taking percocet 5/325 at 6 am, noon and midnight. Pain at his worse is 5/10 and with pain medication it is a 2/10. Per Carolina Miller he can increase to every 4 hours, if that does not then they will change it to 10/325. Until it can be changed if doesn't work he can take 2 every 6 hours.

## 2025-02-17 NOTE — TELEPHONE ENCOUNTER
Percocet sent.  Please contact Dr. Ordoñez's office to get patient in sooner for results/evaluation due to current pain status and concerns for progression per symptoms. Please let me know the plan so we can decide on continuing the capecitabine.

## 2025-02-20 ENCOUNTER — LAB (OUTPATIENT)
Dept: ONCOLOGY | Facility: OTHER | Age: 70
End: 2025-02-20
Attending: INTERNAL MEDICINE
Payer: COMMERCIAL

## 2025-02-20 ENCOUNTER — CARE COORDINATION (OUTPATIENT)
Dept: CARE COORDINATION | Facility: OTHER | Age: 70
End: 2025-02-20

## 2025-02-20 VITALS
DIASTOLIC BLOOD PRESSURE: 82 MMHG | SYSTOLIC BLOOD PRESSURE: 132 MMHG | HEIGHT: 69 IN | TEMPERATURE: 97.9 F | OXYGEN SATURATION: 95 % | HEART RATE: 85 BPM | BODY MASS INDEX: 25.27 KG/M2 | WEIGHT: 170.64 LBS

## 2025-02-20 DIAGNOSIS — C20 RECTAL CANCER (H): ICD-10-CM

## 2025-02-20 DIAGNOSIS — C20 RECTAL CANCER (H): Primary | ICD-10-CM

## 2025-02-20 LAB
ALBUMIN SERPL BCG-MCNC: 4.5 G/DL (ref 3.5–5.2)
ALP SERPL-CCNC: 101 U/L (ref 40–150)
ALT SERPL W P-5'-P-CCNC: 14 U/L (ref 0–70)
ANION GAP SERPL CALCULATED.3IONS-SCNC: 14 MMOL/L (ref 7–15)
AST SERPL W P-5'-P-CCNC: 26 U/L (ref 0–45)
BASOPHILS # BLD AUTO: 0.1 10E3/UL (ref 0–0.2)
BASOPHILS NFR BLD AUTO: 1 %
BILIRUB SERPL-MCNC: 0.4 MG/DL
BUN SERPL-MCNC: 23.8 MG/DL (ref 8–23)
CALCIUM SERPL-MCNC: 9.9 MG/DL (ref 8.8–10.4)
CEA SERPL-MCNC: 15.3 NG/ML
CHLORIDE SERPL-SCNC: 99 MMOL/L (ref 98–107)
CREAT SERPL-MCNC: 1.21 MG/DL (ref 0.67–1.17)
EGFRCR SERPLBLD CKD-EPI 2021: 65 ML/MIN/1.73M2
EOSINOPHIL # BLD AUTO: 0.5 10E3/UL (ref 0–0.7)
EOSINOPHIL NFR BLD AUTO: 5 %
ERYTHROCYTE [DISTWIDTH] IN BLOOD BY AUTOMATED COUNT: 13.2 % (ref 10–15)
GLUCOSE SERPL-MCNC: 180 MG/DL (ref 70–99)
HCO3 SERPL-SCNC: 24 MMOL/L (ref 22–29)
HCT VFR BLD AUTO: 36.3 % (ref 40–53)
HGB BLD-MCNC: 12.3 G/DL (ref 13.3–17.7)
IMM GRANULOCYTES # BLD: 0.1 10E3/UL
IMM GRANULOCYTES NFR BLD: 0 %
LYMPHOCYTES # BLD AUTO: 2.4 10E3/UL (ref 0.8–5.3)
LYMPHOCYTES NFR BLD AUTO: 21 %
MCH RBC QN AUTO: 32.7 PG (ref 26.5–33)
MCHC RBC AUTO-ENTMCNC: 33.9 G/DL (ref 31.5–36.5)
MCV RBC AUTO: 97 FL (ref 78–100)
MONOCYTES # BLD AUTO: 0.8 10E3/UL (ref 0–1.3)
MONOCYTES NFR BLD AUTO: 7 %
NEUTROPHILS # BLD AUTO: 7.7 10E3/UL (ref 1.6–8.3)
NEUTROPHILS NFR BLD AUTO: 67 %
NRBC # BLD AUTO: 0 10E3/UL
NRBC BLD AUTO-RTO: 0 /100
PLATELET # BLD AUTO: 316 10E3/UL (ref 150–450)
POTASSIUM SERPL-SCNC: 4.3 MMOL/L (ref 3.4–5.3)
PROT SERPL-MCNC: 7.6 G/DL (ref 6.4–8.3)
RBC # BLD AUTO: 3.76 10E6/UL (ref 4.4–5.9)
SODIUM SERPL-SCNC: 137 MMOL/L (ref 135–145)
WBC # BLD AUTO: 11.6 10E3/UL (ref 4–11)

## 2025-02-20 PROCEDURE — G0463 HOSPITAL OUTPT CLINIC VISIT: HCPCS

## 2025-02-20 PROCEDURE — 96523 IRRIG DRUG DELIVERY DEVICE: CPT

## 2025-02-20 PROCEDURE — 82378 CARCINOEMBRYONIC ANTIGEN: CPT | Mod: ZL

## 2025-02-20 PROCEDURE — 85025 COMPLETE CBC W/AUTO DIFF WBC: CPT | Mod: ZL

## 2025-02-20 PROCEDURE — 82040 ASSAY OF SERUM ALBUMIN: CPT | Mod: ZL

## 2025-02-20 PROCEDURE — 36415 COLL VENOUS BLD VENIPUNCTURE: CPT

## 2025-02-20 RX ORDER — GABAPENTIN 100 MG/1
100 CAPSULE ORAL 3 TIMES DAILY
COMMUNITY
Start: 2025-02-19

## 2025-02-20 ASSESSMENT — PAIN SCALES - GENERAL: PAINLEVEL_OUTOF10: MODERATE PAIN (6)

## 2025-02-20 NOTE — NURSING NOTE
"Oncology Rooming Note    February 20, 2025 8:34 AM   Tal Mcbride is a 69 year old male who presents for:    Chief Complaint   Patient presents with    Oncology Clinic Visit     Follow up. Rectal cancer      Initial Vitals: /82 (BP Location: Right arm, Patient Position: Sitting, Cuff Size: Adult Regular)   Pulse 85   Temp 97.9  F (36.6  C) (Tympanic)   Ht 1.753 m (5' 9\")   Wt 77.4 kg (170 lb 10.2 oz)   SpO2 95%   BMI 25.20 kg/m   Estimated body mass index is 25.2 kg/m  as calculated from the following:    Height as of this encounter: 1.753 m (5' 9\").    Weight as of this encounter: 77.4 kg (170 lb 10.2 oz). Body surface area is 1.94 meters squared.  Moderate Pain (6) Comment: Data Unavailable   No LMP for male patient.  Allergies reviewed: Yes  Medications reviewed: Yes    Medications: Medication refills not needed today.  Pharmacy name entered into [employer]:    [facility] - [place], [place] - [address] [facility] - [place], [place] - [address]    Frailty Screening:   Is the patient here for a new oncology consult visit in cancer care? 2. No    PHQ9:  Did this patient require a PHQ9?: No      Clinical concerns: wants to discuss recent colonoscopy and biopsy results and possible new treatment discussion  Dr Poon was notified.      Larissa Whiteside LPN                "

## 2025-02-20 NOTE — PROGRESS NOTES
Patients left sided port accessed using non-coring, 19 gauge, 3/4 needle. Port accessed per facility protocol.  Hand hygiene performed: yes   Mask donned by caregiver: yes Site prepped with CHG: yes Labs drawn: yes Dressing applied using aseptic technique: yes Port flushed easily, without resistance. Flushed with 10 cc's normal saline.   Immediate blood return noted. 10 cc blood discarded.  2 vials blood draw and sent to lab for results.  Port flushed with 20 cc 0.9% normal saline and 5 cc heparinized saline.  Needle removed intact, sterile dressing applied.  Slight pressure applied for 30 seconds.   Patient tolerated port flush well, denies pain nor discomfort at this time. Patient instructed to leave dressing intact for a minimum of one hour, and to call with questions or concerns.  Patient states understanding and is in agreement with this plan. Patient discharged ambulatory. Nano Verma RN

## 2025-02-20 NOTE — PROGRESS NOTES
MEDICAL ONCOLOGY FOLLOW-UP NOTE  Feb 20, 2025    Reason for follow-up: Rectal cancer    HISTORY OF PRESENT ILLNESS  Tal Mcbride is a 69 year old male with PMH as stated below who is seen in the oncology clinic for rectal cancer.    His history in short is as follows:    Mr. Mcbride started having diarrhea in the end of March 2024.  He was evaluated by his primary care and recommended to undergo a colonoscopy and upper endoscopy.    4/29/2024: EGD: 3 cm hiatal hernia.  Normal stomach.  Normal third portion of the duodenum.  Nonobstructing Schatzki's ring.    4/29/2024: Sigmoidoscopy: The digital rectal exam revealed a soft tissue mass palpated 8 cm from the anal verge.  The mass was noncircumferential and located predominantly at the posterior bowel wall.  An infiltrative partially obstructing large mass was found in the rectum.  The mass was circumferential.  The mass measured 4 cm in length.  No bleeding was present.  This was biopsied.  A pediatric colonoscope was passed past the area but patient had a large quantity of stool present just above the stricture so colonoscopy was not able to be completed.    Biopsy of the rectal mass showed moderately to poorly differentiated adenocarcinoma.  [facility]    5/2/2024 CT chest with contrast: No definite findings for metastatic disease in the chest. 2 small pulmonary nodules measuring up to 4 mm unchanged from prior study     5/2/2024: MRI pelvis with and without contrast: A carcinoma involving the lower, middle and upper rectum extending over a length of roughly 12 cm demonstrated.  This lesion involves the mesorectum with no significant invasion anterolaterally on the right at the level of the mid rectum.  At this site infiltrative neoplasm is contactless with the posterior margin of the right seminal vesicle but it is not clearly invaded.  Neoplasm extends roughly 1.5 cm beyond the rectal margin.  Numerous enlarged mesorectal lymph nodes are demonstrated.  The larger  mesorectal lymph node is demonstrated on the right measuring 1.8X 1.5 cm.  Additional mesorectal lymph nodes with short axis measurements at least 1 cm demonstrated.  Inferior mesenteric lymph nodes with short axis measurement of 1.1 cm X 1.0 cm are noted.  A left internal iliac node measuring 1.2X 0.8 cm is demonstrated.  A 2.7 X1.8 centimeter right iliac node is demonstrated.  The other visualized bowel is unremarkable.  The prostate is mildly enlarged.  No free fluid is demonstrated.    5/13/2024: CT abdomen and pelvis without contrast: Large infiltrative rectal mass involving the upper middle and lower rectum.  There is surrounding inflammatory change and pelvic free fluid.  There is adjacent mesorectal adenopathy, a left internal iliac node as well.  Large amount of stool throughout the colon.  Enlarged prostate gland.  Left probable UPJ obstruction.    6/6/2024: Diverting colostomy      7/25/2024: PET scan: Masslike thickening with associated FDG avidity throughout the rectum is consistent with given history.  Metastatic disease within the liver, retroperitoneal nodes, perirectal nodes and inguinal nodes.  Findings are concerning for a UPJ obstruction.  Nuclear medicine renal Lasix scan for further characterize.    7/24/2024 to 12/23/2024:12 cycles of FOLFOX    12/18/2024: PET scan:IMPRESSION: Favorable response to therapy. Interval resolution of  hypermetabolism associated with a focal metastatic disease to the  liver as was seen previously and interval resolution of  hypermetabolism within the rectal lesion.    1/11/2025: Started Capecitabine.     Interim history:    Mr. Mcbride is doing well today. He was seen in the emergency room on 2/3/2025 with rectal bleeding. He continues to have some bleeding but not significant and better than what he had on 2/3. He then underwent lower US on 2/11/2025 which showed persistent disease in the rectum.   He is tolerating capecitabine well.     REVIEW OF SYSTEMS  A 12-point  ROS negative except as in HPI      Current Outpatient Medications   Medication Sig Dispense Refill    acetaminophen (TYLENOL) 500 MG tablet Take 650 mg by mouth every 6 hours as needed for mild pain.      aspirin 81 MG EC tablet Take 81 mg by mouth daily      atorvastatin (LIPITOR) 20 MG tablet Take 20 mg by mouth daily      gabapentin (NEURONTIN) 100 MG capsule Take 100 mg by mouth 3 times daily.      glipiZIDE (GLUCOTROL XL) 2.5 MG 24 hr tablet Take 2.5 mg by mouth daily. Outside prescriber      hydrochlorothiazide (HYDRODIURIL) 25 MG tablet Take 25 mg by mouth daily      lisinopril (ZESTRIL) 40 MG tablet Take 40 mg by mouth daily      metFORMIN (GLUCOPHAGE-XR) 750 MG 24 hr tablet Take 500 mg by mouth 2 times daily (with meals).      multivitamin w/minerals (MULTI-VITAMIN) tablet Take 1 tablet by mouth daily      oxyCODONE-acetaminophen (PERCOCET) 5-325 MG tablet Take 1 tablet by mouth every 4 hours as needed for pain. 90 tablet 0    Oxymetazoline HCl (NASAL DECONGESTANT SPRAY NA) Spray in nostril as needed.      Polyethylene Glycol 3350 (MIRALAX PO) Take 1 Capful by mouth daily      lidocaine-prilocaine (EMLA) 2.5-2.5 % external cream Apply topically daily as needed for moderate pain (Patient not taking: Reported on 2/20/2025) 30 g 1    loperamide (IMODIUM A-D) 2 MG tablet Take 1 tablet (2 mg) by mouth 4 times daily as needed for diarrhea (Patient not taking: Reported on 2/20/2025) 90 tablet 0    traZODone (DESYREL) 100 MG tablet Take 100 mg by mouth at bedtime. (Patient not taking: Reported on 2/20/2025)         No Known Allergies  Immunization History   Administered Date(s) Administered    COVID-19 Bivalent 12+ (Pfizer) 10/27/2022    COVID-19 Monovalent 18+ (Moderna) 01/16/2021, 02/13/2021, 11/29/2021    Hepatitis B, Adult 10/03/2013, 03/28/2014, 12/30/2014    Influenza Vaccine (Flucelvax Quadrivalent) 10/07/2022    Pneumococcal 23 valent 10/03/2013    TDAP (Adacel,Boostrix) 10/03/2012       Past Medical  "History:   Diagnosis Date    Adolescent idiopathic scoliosis of thoracolumbar region 11/16/2017    Bunion 03/28/2013    Essential hypertension 03/05/2012    Formatting of this note might be different from the original.   Epic      Pure hypercholesterolemia 10/04/2012    Rectal cancer (H) 05/29/2024    Scoliosis     Severe protein-calorie malnutrition 06/07/2024    Type 2 diabetes mellitus without complication (H) 06/21/2012       Past Surgical History:   Procedure Laterality Date    CATARACT EXTRACTION W/ INTRAOCULAR LENS IMPLANT Left 2021    ENDOSCOPY N/A     upper and lower    GI SURGERY      PHACOEMULSIFICATION WITH STANDARD INTRAOCULAR LENS IMPLANT Right 09/29/2021    Procedure: right Cataract Extraction with Implant;  Surgeon: Juan Prado MD;  Location: [facility] OR    robotic colostomy N/A 06/06/2024       SOCIAL HISTORY  History   Smoking Status    Former    Types: Cigarettes   Smokeless Tobacco    Former    Social History    Substance and Sexual Activity      Alcohol use: Not Currently     History   Drug Use Unknown       FAMILY HISTORY  Family History   Problem Relation Age of Onset    Hypertension Mother     Pancreatic Cancer Father 55    Hypertension Sister     Other Problems  (knee replacement) Sister         bilateal knee replacement    Other Problems  (hip replacement) Sister         bilateral hip replacement    Diabetes No family hx of     Coronary Artery Disease No family hx of     Hyperlipidemia No family hx of     Cerebrovascular Disease No family hx of     Breast Cancer No family hx of     Colon Cancer No family hx of     Prostate Cancer No family hx of     Anesthesia Reaction No family hx of     Asthma No family hx of     Genetic Disorder No family hx of     Thyroid Disease No family hx of        PHYSICAL EXAMINATION  /82 (BP Location: Right arm, Patient Position: Sitting, Cuff Size: Adult Regular)   Pulse 85   Temp 97.9  F (36.6  C) (Tympanic)   Ht 1.753 m (5' 9\")   Wt 77.4 kg " (170 lb 10.2 oz)   SpO2 95%   BMI 25.20 kg/m    Wt Readings from Last 2 Encounters:   02/20/25 77.4 kg (170 lb 10.2 oz)   01/30/25 81.5 kg (179 lb 10.8 oz)     Physical Exam  Constitutional:       Appearance: Normal appearance.   Abdominal:      General: Abdomen is flat.   Neurological:      General: No focal deficit present.      Mental Status: He is alert and oriented to person, place, and time.   Psychiatric:         Mood and Affect: Mood normal.     Laboratory and imaging:     Latest Reference Range & Units 02/20/25 08:34   WBC 4.0 - 11.0 10e3/uL 11.6 (H)   Hemoglobin 13.3 - 17.7 g/dL 12.3 (L)   Hematocrit 40.0 - 53.0 % 36.3 (L)   Platelet Count 150 - 450 10e3/uL 316   (H): Data is abnormally high  (L): Data is abnormally low  Foundation one: no reportable RNA variants EGFR amplification KRAS wildtype NRAS wildtype APC * ARIDIA * TP53 splice site 560-2A>G Diseas  e relevant genes with no reportable alterations: BRAF, ERBB2, KRAS, NRAS.    ASSESSMENT AND PLAN    1.  Stage IV rectal cancer: K-pastora wild-type, pMMR    Initially presented with diarrhea in March 2024.  Sigmoidoscopy showed rectal mass infiltrative partially obstructing large mass 8 cm from the anal verge.  MRI pelvis showed carcinoma involving lower, middle and upper rectum extending over a length of roughly 12 cm.  Lesion involves the mesorectum but most significant invasion anterolaterally on the right at the level of the mid rectum.  Neoplasm is contiguous with the posterior margin of the right seminal vesicle but is not clearly invading it.  Extends roughly 1.5 cm beyond the rectal margin.  It also showed numerous enlarged mesorectal lymph nodes.  A inferior mesenteric lymph node was also noted.  There were also enlarged left iliac nodes and right iliac node.    He has already had a diverting ostomy by  on 6/6/2024.    He was then found to have metastatic disease on PET scan.   PET scan showed 5 foci of abnormal avidity  present within the liver, involvement of the common iliac lymph node, upper abdominal para-aortic lymph node, right common iliac node and left perirectal node.  A few inguinal nodes are also avid.    He has completed 12 cycles of FOLFOX from 7/2024-12/ 2024.  PET scan 9/12/2024 showed very good response.  For his convenience as he was having difficulty with the 5-FU pump we made a decision to switch to oral capecitabine for maintenance.    He started treatment on 1/11/2025 but was held when he went to ED on 2/3/2025. He is doing better now. Bleeding has improved. He can continue with Capecitabine.  He will start tomorrow.     He also understands if bleeding starts again palliative RT is an option. His CEA is also increasing slowly. Will continue to monitor.     Will see him back in 3 weeks.     Total time spent on the patient on day of encounter was 40 minutes doing chart review, review of test results, interpretation of results, patient visit and documentation.       Shawna Poon MD

## 2025-02-20 NOTE — PROGRESS NOTES
Care Coordination Focused Note:    Met w/pt before appt with Dr. Poon. Pt is inquiring about receiving assistance from the [employer]. Writer filled out an application and had pt sign it. Writer sent via secure email to [email or web address]. Advised to pt's spouse that pt has applied twice and new [employer] guidelines have a maximum of 2 gifts/patient. Emailed a blank [employer] application to pt's spouse per her request.

## 2025-03-13 ENCOUNTER — ONCOLOGY VISIT (OUTPATIENT)
Dept: ONCOLOGY | Facility: OTHER | Age: 70
End: 2025-03-13
Attending: INTERNAL MEDICINE
Payer: COMMERCIAL

## 2025-03-13 VITALS
OXYGEN SATURATION: 96 % | DIASTOLIC BLOOD PRESSURE: 84 MMHG | BODY MASS INDEX: 25.01 KG/M2 | WEIGHT: 168.87 LBS | HEIGHT: 69 IN | TEMPERATURE: 97.8 F | HEART RATE: 85 BPM | SYSTOLIC BLOOD PRESSURE: 132 MMHG

## 2025-03-13 DIAGNOSIS — C20 RECTAL CANCER (H): Primary | ICD-10-CM

## 2025-03-13 LAB
ALBUMIN SERPL BCG-MCNC: 4.3 G/DL (ref 3.5–5.2)
ALP SERPL-CCNC: 103 U/L (ref 40–150)
ALT SERPL W P-5'-P-CCNC: 13 U/L (ref 0–70)
ANION GAP SERPL CALCULATED.3IONS-SCNC: 13 MMOL/L (ref 7–15)
AST SERPL W P-5'-P-CCNC: 24 U/L (ref 0–45)
BASOPHILS # BLD AUTO: 0.1 10E3/UL (ref 0–0.2)
BASOPHILS NFR BLD AUTO: 1 %
BILIRUB SERPL-MCNC: 0.5 MG/DL
BUN SERPL-MCNC: 32.8 MG/DL (ref 8–23)
CALCIUM SERPL-MCNC: 9.9 MG/DL (ref 8.8–10.4)
CHLORIDE SERPL-SCNC: 98 MMOL/L (ref 98–107)
CREAT SERPL-MCNC: 1.62 MG/DL (ref 0.67–1.17)
EGFRCR SERPLBLD CKD-EPI 2021: 46 ML/MIN/1.73M2
EOSINOPHIL # BLD AUTO: 0.7 10E3/UL (ref 0–0.7)
EOSINOPHIL NFR BLD AUTO: 6 %
ERYTHROCYTE [DISTWIDTH] IN BLOOD BY AUTOMATED COUNT: 14.1 % (ref 10–15)
GLUCOSE SERPL-MCNC: 156 MG/DL (ref 70–99)
HCO3 SERPL-SCNC: 23 MMOL/L (ref 22–29)
HCT VFR BLD AUTO: 32.4 % (ref 40–53)
HGB BLD-MCNC: 11.1 G/DL (ref 13.3–17.7)
IMM GRANULOCYTES # BLD: 0 10E3/UL
IMM GRANULOCYTES NFR BLD: 0 %
LYMPHOCYTES # BLD AUTO: 2.1 10E3/UL (ref 0.8–5.3)
LYMPHOCYTES NFR BLD AUTO: 18 %
MCH RBC QN AUTO: 33 PG (ref 26.5–33)
MCHC RBC AUTO-ENTMCNC: 34.3 G/DL (ref 31.5–36.5)
MCV RBC AUTO: 96 FL (ref 78–100)
MONOCYTES # BLD AUTO: 0.9 10E3/UL (ref 0–1.3)
MONOCYTES NFR BLD AUTO: 8 %
NEUTROPHILS # BLD AUTO: 7.6 10E3/UL (ref 1.6–8.3)
NEUTROPHILS NFR BLD AUTO: 67 %
NRBC # BLD AUTO: 0 10E3/UL
NRBC BLD AUTO-RTO: 0 /100
PLATELET # BLD AUTO: 294 10E3/UL (ref 150–450)
POTASSIUM SERPL-SCNC: 4.6 MMOL/L (ref 3.4–5.3)
PROT SERPL-MCNC: 7.1 G/DL (ref 6.4–8.3)
RBC # BLD AUTO: 3.36 10E6/UL (ref 4.4–5.9)
SODIUM SERPL-SCNC: 134 MMOL/L (ref 135–145)
WBC # BLD AUTO: 11.5 10E3/UL (ref 4–11)

## 2025-03-13 PROCEDURE — 36415 COLL VENOUS BLD VENIPUNCTURE: CPT

## 2025-03-13 PROCEDURE — 82040 ASSAY OF SERUM ALBUMIN: CPT | Mod: ZL

## 2025-03-13 PROCEDURE — 80053 COMPREHEN METABOLIC PANEL: CPT | Mod: ZL

## 2025-03-13 PROCEDURE — 85025 COMPLETE CBC W/AUTO DIFF WBC: CPT | Mod: ZL

## 2025-03-13 PROCEDURE — G0463 HOSPITAL OUTPT CLINIC VISIT: HCPCS

## 2025-03-13 PROCEDURE — 82310 ASSAY OF CALCIUM: CPT | Mod: ZL

## 2025-03-13 PROCEDURE — 96523 IRRIG DRUG DELIVERY DEVICE: CPT

## 2025-03-13 RX ORDER — ONDANSETRON 4 MG/1
4 TABLET, ORALLY DISINTEGRATING ORAL EVERY 8 HOURS PRN
COMMUNITY
Start: 2025-03-11

## 2025-03-13 RX ORDER — NITROGLYCERIN 4 MG/G
1 OINTMENT RECTAL EVERY 12 HOURS
COMMUNITY
Start: 2025-03-12

## 2025-03-13 RX ORDER — GABAPENTIN 300 MG/1
300 CAPSULE ORAL 3 TIMES DAILY
COMMUNITY
Start: 2025-03-11

## 2025-03-13 RX ORDER — OXYCODONE HYDROCHLORIDE 10 MG/1
10 TABLET ORAL 2 TIMES DAILY
COMMUNITY
Start: 2025-03-11

## 2025-03-13 ASSESSMENT — PAIN SCALES - GENERAL: PAINLEVEL_OUTOF10: MILD PAIN (3)

## 2025-03-13 NOTE — PROGRESS NOTES
MEDICAL ONCOLOGY FOLLOW-UP NOTE  Mar 13, 2025    Reason for follow-up: Rectal cancer    HISTORY OF PRESENT ILLNESS  Tal Mcbride is a 69 year old male with PMH as stated below who is seen in the oncology clinic for rectal cancer.    His history in short is as follows:    Mr. Mcbride started having diarrhea in the end of March 2024.  He was evaluated by his primary care and recommended to undergo a colonoscopy and upper endoscopy.    4/29/2024: EGD: 3 cm hiatal hernia.  Normal stomach.  Normal third portion of the duodenum.  Nonobstructing Schatzki's ring.    4/29/2024: Sigmoidoscopy: The digital rectal exam revealed a soft tissue mass palpated 8 cm from the anal verge.  The mass was noncircumferential and located predominantly at the posterior bowel wall.  An infiltrative partially obstructing large mass was found in the rectum.  The mass was circumferential.  The mass measured 4 cm in length.  No bleeding was present.  This was biopsied.  A pediatric colonoscope was passed past the area but patient had a large quantity of stool present just above the stricture so colonoscopy was not able to be completed.    Biopsy of the rectal mass showed moderately to poorly differentiated adenocarcinoma.  [facility] [place]    5/2/2024 CT chest with contrast: No definite findings for metastatic disease in the chest. 2 small pulmonary nodules measuring up to 4 mm unchanged from prior study     5/2/2024: MRI pelvis with and without contrast: A carcinoma involving the lower, middle and upper rectum extending over a length of roughly 12 cm demonstrated.  This lesion involves the mesorectum with no significant invasion anterolaterally on the right at the level of the mid rectum.  At this site infiltrative neoplasm is contactless with the posterior margin of the right seminal vesicle but it is not clearly invaded.  Neoplasm extends roughly 1.5 cm beyond the rectal margin.  Numerous enlarged mesorectal lymph nodes are demonstrated.  The larger  mesorectal lymph node is demonstrated on the right measuring 1.8X 1.5 cm.  Additional mesorectal lymph nodes with short axis measurements at least 1 cm demonstrated.  Inferior mesenteric lymph nodes with short axis measurement of 1.1 cm X 1.0 cm are noted.  A left internal iliac node measuring 1.2X 0.8 cm is demonstrated.  A 2.7 X1.8 centimeter right iliac node is demonstrated.  The other visualized bowel is unremarkable.  The prostate is mildly enlarged.  No free fluid is demonstrated.    5/13/2024: CT abdomen and pelvis without contrast: Large infiltrative rectal mass involving the upper middle and lower rectum.  There is surrounding inflammatory change and pelvic free fluid.  There is adjacent mesorectal adenopathy, a left internal iliac node as well.  Large amount of stool throughout the colon.  Enlarged prostate gland.  Left probable UPJ obstruction.    6/6/2024: Diverting colostomy      7/25/2024: PET scan: Masslike thickening with associated FDG avidity throughout the rectum is consistent with given history.  Metastatic disease within the liver, retroperitoneal nodes, perirectal nodes and inguinal nodes.  Findings are concerning for a UPJ obstruction.  Nuclear medicine renal Lasix scan for further characterize.    7/24/2024 to 12/23/2024:12 cycles of FOLFOX    12/18/2024: PET scan:IMPRESSION: Favorable response to therapy. Interval resolution of  hypermetabolism associated with a focal metastatic disease to the  liver as was seen previously and interval resolution of  hypermetabolism within the rectal lesion.    1/11/2025: Started  Capecitabine.     2/11/2025: Lower EUS:  There  is heavy ulceration with stricturing in the mid rectum       Interim history:    Mr. Mcbride is doing well today.  He is having pain in his rectum.  He feels like his hemorrhoids have flared after his EUS.  Has very intermittent blood in his stools.    REVIEW OF SYSTEMS  A 12-point ROS negative except as in HPI      Current Outpatient  Medications   Medication Sig Dispense Refill    acetaminophen (TYLENOL) 500 MG tablet Take 650 mg by mouth every 6 hours as needed for mild pain.      aspirin 81 MG EC tablet Take 81 mg by mouth daily      atorvastatin (LIPITOR) 20 MG tablet Take 20 mg by mouth daily      capecitabine (XELODA) 500 MG tablet Take 3 tablets (1,500 mg) by mouth 2 times daily for 14 days. Days 1 through 14, then off for 7 days.Take with water within 30 minutes after a meal. 84 tablet 0    gabapentin (NEURONTIN) 300 MG capsule Take 300 mg by mouth 3 times daily. For evening dose may take 2 capsules      glipiZIDE (GLUCOTROL XL) 2.5 MG 24 hr tablet Take 2.5 mg by mouth daily. Outside prescriber      hydrochlorothiazide (HYDRODIURIL) 25 MG tablet Take 25 mg by mouth daily      lisinopril (ZESTRIL) 40 MG tablet Take 40 mg by mouth daily      metFORMIN (GLUCOPHAGE-XR) 750 MG 24 hr tablet Take 500 mg by mouth 2 times daily (with meals).      multivitamin w/minerals (MULTI-VITAMIN) tablet Take 1 tablet by mouth daily      nitroGLYcerin (RECTIV) 0.4 % OINT rectal ointment Apply 1 inch topically every 12 hours.      ondansetron (ZOFRAN ODT) 4 MG ODT tab Take 4 mg by mouth every 8 hours as needed for nausea.      oxyCODONE IR (ROXICODONE) 10 MG tablet Take 10 mg by mouth 2 times daily.      oxyCODONE-acetaminophen (PERCOCET) 5-325 MG tablet Take 1 tablet by mouth every 4 hours as needed for pain. 90 tablet 0    Oxymetazoline HCl (NASAL DECONGESTANT SPRAY NA) Spray in nostril as needed.      Polyethylene Glycol 3350 (MIRALAX PO) Take 1 Capful by mouth daily      traZODone (DESYREL) 100 MG tablet Take 100 mg by mouth at bedtime.      lidocaine-prilocaine (EMLA) 2.5-2.5 % external cream Apply topically daily as needed for moderate pain (Patient not taking: Reported on 12/19/2024) 30 g 1    loperamide (IMODIUM A-D) 2 MG tablet Take 1 tablet (2 mg) by mouth 4 times daily as needed for diarrhea (Patient not taking: Reported on 12/19/2024) 90 tablet 0        No Known Allergies  Immunization History   Administered Date(s) Administered    COVID-19 Bivalent 12+ (Pfizer) 10/27/2022    COVID-19 Monovalent 18+ (Moderna) 01/16/2021, 02/13/2021, 11/29/2021    Hepatitis B, Adult (Energix-B/Recombivax HB) 10/03/2013, 03/28/2014, 12/30/2014    Influenza Vaccine (Flucelvax Quadrivalent) 10/07/2022    Pneumococcal 23 valent 10/03/2013    TDAP (Adacel,Boostrix) 10/03/2012       Past Medical History:   Diagnosis Date    Adolescent idiopathic scoliosis of thoracolumbar region 11/16/2017    Bunion 03/28/2013    Essential hypertension 03/05/2012    Formatting of this note might be different from the original.   Epic      Pure hypercholesterolemia 10/04/2012    Rectal cancer (H) 05/29/2024    Scoliosis     Severe protein-calorie malnutrition 06/07/2024    Type 2 diabetes mellitus without complication (H) 06/21/2012       Past Surgical History:   Procedure Laterality Date    CATARACT EXTRACTION W/ INTRAOCULAR LENS IMPLANT Left 2021    ENDOSCOPY N/A     upper and lower    GI SURGERY      PHACOEMULSIFICATION WITH STANDARD INTRAOCULAR LENS IMPLANT Right 09/29/2021    Procedure: right Cataract Extraction with Implant;  Surgeon: Juan Prado MD;  Location: [facility] OR    robotic colostomy N/A 06/06/2024       SOCIAL HISTORY  History   Smoking Status    Former    Types: Cigarettes   Smokeless Tobacco    Former    Social History    Substance and Sexual Activity      Alcohol use: Not Currently     History   Drug Use Unknown       FAMILY HISTORY  Family History   Problem Relation Age of Onset    Hypertension Mother     Pancreatic Cancer Father 55    Hypertension Sister     Other Problems  (knee replacement) Sister         bilateal knee replacement    Other Problems  (hip replacement) Sister         bilateral hip replacement    Diabetes No family hx of     Coronary Artery Disease No family hx of     Hyperlipidemia No family hx of     Cerebrovascular Disease No family hx of     Breast  "Cancer No family hx of     Colon Cancer No family hx of     Prostate Cancer No family hx of     Anesthesia Reaction No family hx of     Asthma No family hx of     Genetic Disorder No family hx of     Thyroid Disease No family hx of        PHYSICAL EXAMINATION  /84 (BP Location: Right arm, Patient Position: Sitting, Cuff Size: Adult Regular)   Pulse 85   Temp 97.8  F (36.6  C) (Tympanic)   Ht 1.753 m (5' 9\")   Wt 76.6 kg (168 lb 14 oz)   SpO2 96%   BMI 24.94 kg/m    Wt Readings from Last 2 Encounters:   03/13/25 76.6 kg (168 lb 14 oz)   02/20/25 77.4 kg (170 lb 10.2 oz)     Physical Exam  Constitutional:       Appearance: Normal appearance.   Abdominal:      General: Abdomen is flat.   Neurological:      General: No focal deficit present.      Mental Status: He is alert and oriented to person, place, and time.   Psychiatric:         Mood and Affect: Mood normal.       Laboratory and imaging:     Latest Reference Range & Units 03/13/25 08:20   WBC 4.0 - 11.0 10e3/uL 11.5 (H)   Hemoglobin 13.3 - 17.7 g/dL 11.1 (L)   Hematocrit 40.0 - 53.0 % 32.4 (L)   Platelet Count 150 - 450 10e3/uL 294   (H): Data is abnormally high  (L): Data is abnormally low    Foundation one: no reportable RNA variants EGFR amplification KRAS wildtype NRAS wildtype APC * ARIDIA * TP53 splice site 560-2A>G Diseas  e relevant genes with no reportable alterations: BRAF, ERBB2, KRAS, NRAS.    ASSESSMENT AND PLAN    1.  Stage IV rectal cancer: K-pastora wild-type, pMMR    Initially presented with diarrhea in March 2024.  Sigmoidoscopy showed rectal mass infiltrative partially obstructing large mass 8 cm from the anal verge.  MRI pelvis showed carcinoma involving lower, middle and upper rectum extending over a length of roughly 12 cm.  Lesion involves the mesorectum but most significant invasion anterolaterally on the right at the level of the mid rectum.  Neoplasm is contiguous with the posterior margin of the right seminal vesicle " but is not clearly invading it.  Extends roughly 1.5 cm beyond the rectal margin.  It also showed numerous enlarged mesorectal lymph nodes.  A inferior mesenteric lymph node was also noted.  There were also enlarged left iliac nodes and right iliac node.    He has already had a diverting ostomy by  on 6/6/2024.    He was then found to have metastatic disease on PET scan.   PET scan showed 5 foci of abnormal avidity present within the liver, involvement of the common iliac lymph node, upper abdominal para-aortic lymph node, right common iliac node and left perirectal node.  A few inguinal nodes are also avid.    He has completed 12 cycles of FOLFOX from 7/2024-12/ 2024.  PET scan 9/12/2024 showed very good response.  For his convenience as he was having difficulty with the 5-FU pump we made a decision to switch to oral capecitabine for maintenance.      He started treatment on 1/11/2025 but was held when he went to ED on 2/3/2025. He is doing better now. Bleeding has improved.    He has met with his colorectal surgeon, Dr. Cordova for discussion regarding APR with a permanent colostomy.  Given that his quality of life with his current ostomy is not good as he sometimes has leakage from the ostomy I think this would be a good decision.  We did discuss that there is a chance that he is progressing systemically.  His CEA is going up.  Therefore may have to add either bevacizumab or oxaliplatin to his capecitabine.    But we will hold off until he has surgery.  We will also ask him to hold his capecitabine while he recovers.  Will also get a PET scan.    Case discussed with Dr. Cordova today.     Will see him back in 3 weeks with CBCD, CMP and CEA    Total time spent on the patient on day of encounter was 44 minutes doing chart review, review of test results, interpretation of results, patient visit and documentation.       Shawna Poon MD

## 2025-03-13 NOTE — NURSING NOTE
"Oncology Rooming Note    March 13, 2025 8:58 AM   Tal Mcbride is a 69 year old male who presents for:    Chief Complaint   Patient presents with    Oncology Clinic Visit     Follow up. Rectal cancer     Initial Vitals: /84 (BP Location: Right arm, Patient Position: Sitting, Cuff Size: Adult Regular)   Pulse 85   Temp 97.8  F (36.6  C) (Tympanic)   Ht 1.753 m (5' 9\")   Wt 76.6 kg (168 lb 14 oz)   SpO2 96%   BMI 24.94 kg/m   Estimated body mass index is 24.94 kg/m  as calculated from the following:    Height as of this encounter: 1.753 m (5' 9\").    Weight as of this encounter: 76.6 kg (168 lb 14 oz). Body surface area is 1.93 meters squared.  Mild Pain (3) Comment: Data Unavailable   No LMP for male patient.  Allergies reviewed: Yes  Medications reviewed: Yes    Medications: Medication refills not needed today.  Pharmacy name entered into [employer]:    [facility] - [place], [place] - [address] [facility] [place], [place] - [address]    Frailty Screening:   Is the patient here for a new oncology consult visit in cancer care? 2. No    PHQ9:  Did this patient require a PHQ9?: No      Clinical concerns: none       Larissa Whiteside LPN              "

## 2025-03-16 ENCOUNTER — HEALTH MAINTENANCE LETTER (OUTPATIENT)
Age: 70
End: 2025-03-16

## 2025-03-16 LAB — CEA SERPL-MCNC: 27.4 NG/ML

## 2025-03-23 DIAGNOSIS — C20 RECTAL CANCER (H): Primary | ICD-10-CM

## 2025-03-26 ENCOUNTER — HOSPITAL ENCOUNTER (OUTPATIENT)
Dept: PET IMAGING | Facility: HOSPITAL | Age: 70
Discharge: HOME | End: 2025-03-26
Attending: INTERNAL MEDICINE
Payer: COMMERCIAL

## 2025-03-26 DIAGNOSIS — C20 RECTAL CANCER (H): ICD-10-CM

## 2025-03-26 PROCEDURE — A9552 F18 FDG: HCPCS | Performed by: STUDENT IN AN ORGANIZED HEALTH CARE EDUCATION/TRAINING PROGRAM

## 2025-03-26 PROCEDURE — 78815 PET IMAGE W/CT SKULL-THIGH: CPT | Mod: PS

## 2025-03-26 PROCEDURE — 343N000001 HC RX 343 MED OP 636: Performed by: STUDENT IN AN ORGANIZED HEALTH CARE EDUCATION/TRAINING PROGRAM

## 2025-03-26 RX ORDER — FLUDEOXYGLUCOSE F 18 200 MCI/ML
12.3 INJECTION, SOLUTION INTRAVENOUS ONCE
Status: COMPLETED | OUTPATIENT
Start: 2025-03-26 | End: 2025-03-26

## 2025-03-26 RX ADMIN — FLUDEOXYGLUCOSE F 18 12.3 MILLICURIE: 200 INJECTION, SOLUTION INTRAVENOUS at 08:30

## 2025-03-27 DIAGNOSIS — C20 RECTAL CANCER (H): Primary | ICD-10-CM

## 2025-03-27 RX ORDER — CAPECITABINE 500 MG/1
1500 TABLET, FILM COATED ORAL 2 TIMES DAILY
Qty: 84 TABLET | Refills: 0 | OUTPATIENT
Start: 2025-03-27 | End: 2025-04-10

## 2025-04-29 ENCOUNTER — CARE COORDINATION (OUTPATIENT)
Dept: CARE COORDINATION | Facility: OTHER | Age: 70
End: 2025-04-29

## 2025-04-29 ENCOUNTER — LAB (OUTPATIENT)
Dept: ONCOLOGY | Facility: OTHER | Age: 70
End: 2025-04-29
Attending: INTERNAL MEDICINE
Payer: COMMERCIAL

## 2025-04-29 ENCOUNTER — TELEPHONE (OUTPATIENT)
Dept: ONCOLOGY | Facility: OTHER | Age: 70
End: 2025-04-29

## 2025-04-29 VITALS
SYSTOLIC BLOOD PRESSURE: 124 MMHG | BODY MASS INDEX: 25.08 KG/M2 | DIASTOLIC BLOOD PRESSURE: 70 MMHG | OXYGEN SATURATION: 98 % | WEIGHT: 169.31 LBS | TEMPERATURE: 97.5 F | RESPIRATION RATE: 20 BRPM | HEIGHT: 69 IN | HEART RATE: 90 BPM

## 2025-04-29 DIAGNOSIS — C20 RECTAL CANCER (H): Primary | ICD-10-CM

## 2025-04-29 LAB
ALBUMIN SERPL BCG-MCNC: 3.8 G/DL (ref 3.5–5.2)
ALP SERPL-CCNC: 121 U/L (ref 40–150)
ALT SERPL W P-5'-P-CCNC: 11 U/L (ref 0–70)
ANION GAP SERPL CALCULATED.3IONS-SCNC: 14 MMOL/L (ref 7–15)
AST SERPL W P-5'-P-CCNC: 30 U/L (ref 0–45)
BASOPHILS # BLD AUTO: 0.1 10E3/UL (ref 0–0.2)
BASOPHILS NFR BLD AUTO: 1 %
BILIRUB SERPL-MCNC: 0.3 MG/DL
BUN SERPL-MCNC: 23.4 MG/DL (ref 8–23)
CALCIUM SERPL-MCNC: 9.5 MG/DL (ref 8.8–10.4)
CEA SERPL-MCNC: 75.9 NG/ML
CHLORIDE SERPL-SCNC: 102 MMOL/L (ref 98–107)
CREAT SERPL-MCNC: 1.2 MG/DL (ref 0.67–1.17)
EGFRCR SERPLBLD CKD-EPI 2021: 65 ML/MIN/1.73M2
EOSINOPHIL # BLD AUTO: 0.9 10E3/UL (ref 0–0.7)
EOSINOPHIL NFR BLD AUTO: 7 %
ERYTHROCYTE [DISTWIDTH] IN BLOOD BY AUTOMATED COUNT: 14.9 % (ref 10–15)
GLUCOSE SERPL-MCNC: 167 MG/DL (ref 70–99)
HCO3 SERPL-SCNC: 22 MMOL/L (ref 22–29)
HCT VFR BLD AUTO: 31.1 % (ref 40–53)
HGB BLD-MCNC: 10.3 G/DL (ref 13.3–17.7)
IMM GRANULOCYTES # BLD: 0.1 10E3/UL
IMM GRANULOCYTES NFR BLD: 0 %
LYMPHOCYTES # BLD AUTO: 1.8 10E3/UL (ref 0.8–5.3)
LYMPHOCYTES NFR BLD AUTO: 14 %
MCH RBC QN AUTO: 31.1 PG (ref 26.5–33)
MCHC RBC AUTO-ENTMCNC: 33.1 G/DL (ref 31.5–36.5)
MCV RBC AUTO: 94 FL (ref 78–100)
MONOCYTES # BLD AUTO: 0.8 10E3/UL (ref 0–1.3)
MONOCYTES NFR BLD AUTO: 6 %
NEUTROPHILS # BLD AUTO: 9.5 10E3/UL (ref 1.6–8.3)
NEUTROPHILS NFR BLD AUTO: 72 %
NRBC # BLD AUTO: 0 10E3/UL
NRBC BLD AUTO-RTO: 0 /100
PLATELET # BLD AUTO: 414 10E3/UL (ref 150–450)
POTASSIUM SERPL-SCNC: 4.6 MMOL/L (ref 3.4–5.3)
PROT SERPL-MCNC: 7.3 G/DL (ref 6.4–8.3)
RBC # BLD AUTO: 3.31 10E6/UL (ref 4.4–5.9)
SODIUM SERPL-SCNC: 138 MMOL/L (ref 135–145)
WBC # BLD AUTO: 13.2 10E3/UL (ref 4–11)

## 2025-04-29 PROCEDURE — 36415 COLL VENOUS BLD VENIPUNCTURE: CPT

## 2025-04-29 PROCEDURE — 82378 CARCINOEMBRYONIC ANTIGEN: CPT | Mod: ZL

## 2025-04-29 PROCEDURE — 96523 IRRIG DRUG DELIVERY DEVICE: CPT

## 2025-04-29 PROCEDURE — 85004 AUTOMATED DIFF WBC COUNT: CPT | Mod: ZL

## 2025-04-29 PROCEDURE — 82310 ASSAY OF CALCIUM: CPT | Mod: ZL

## 2025-04-29 PROCEDURE — G0463 HOSPITAL OUTPT CLINIC VISIT: HCPCS

## 2025-04-29 RX ORDER — ONDANSETRON 2 MG/ML
8 INJECTION INTRAMUSCULAR; INTRAVENOUS ONCE
OUTPATIENT
Start: 2025-05-07

## 2025-04-29 RX ORDER — HEPARIN SODIUM,PORCINE 10 UNIT/ML
5-20 VIAL (ML) INTRAVENOUS DAILY PRN
OUTPATIENT
Start: 2025-05-07

## 2025-04-29 RX ORDER — DIPHENHYDRAMINE HYDROCHLORIDE 50 MG/ML
25 INJECTION, SOLUTION INTRAMUSCULAR; INTRAVENOUS
Start: 2025-05-07

## 2025-04-29 RX ORDER — EPINEPHRINE 1 MG/ML
0.3 INJECTION, SOLUTION, CONCENTRATE INTRAVENOUS EVERY 5 MIN PRN
OUTPATIENT
Start: 2025-05-07

## 2025-04-29 RX ORDER — HEPARIN SODIUM,PORCINE 10 UNIT/ML
5-20 VIAL (ML) INTRAVENOUS DAILY PRN
OUTPATIENT
Start: 2025-05-09

## 2025-04-29 RX ORDER — HEPARIN SODIUM (PORCINE) LOCK FLUSH IV SOLN 100 UNIT/ML 100 UNIT/ML
5 SOLUTION INTRAVENOUS
OUTPATIENT
Start: 2025-05-07

## 2025-04-29 RX ORDER — METHYLPREDNISOLONE SODIUM SUCCINATE 40 MG/ML
40 INJECTION INTRAMUSCULAR; INTRAVENOUS
Start: 2025-05-07

## 2025-04-29 RX ORDER — ALBUTEROL SULFATE 90 UG/1
1-2 INHALANT RESPIRATORY (INHALATION)
Start: 2025-05-07

## 2025-04-29 RX ORDER — MEPERIDINE HYDROCHLORIDE 25 MG/ML
25 INJECTION INTRAMUSCULAR; INTRAVENOUS; SUBCUTANEOUS
OUTPATIENT
Start: 2025-05-07

## 2025-04-29 RX ORDER — DIPHENHYDRAMINE HYDROCHLORIDE 50 MG/ML
50 INJECTION, SOLUTION INTRAMUSCULAR; INTRAVENOUS
Start: 2025-05-07

## 2025-04-29 RX ORDER — ALBUTEROL SULFATE 0.83 MG/ML
2.5 SOLUTION RESPIRATORY (INHALATION)
OUTPATIENT
Start: 2025-05-07

## 2025-04-29 RX ORDER — FLUOROURACIL 50 MG/ML
400 INJECTION, SOLUTION INTRAVENOUS ONCE
OUTPATIENT
Start: 2025-05-07

## 2025-04-29 RX ORDER — FLUCONAZOLE 200 MG/1
200 TABLET ORAL DAILY
Qty: 14 TABLET | Refills: 0 | Status: SHIPPED | OUTPATIENT
Start: 2025-04-29

## 2025-04-29 RX ORDER — HEPARIN SODIUM (PORCINE) LOCK FLUSH IV SOLN 100 UNIT/ML 100 UNIT/ML
5 SOLUTION INTRAVENOUS
OUTPATIENT
Start: 2025-05-09

## 2025-04-29 RX ORDER — LORAZEPAM 2 MG/ML
0.5 INJECTION INTRAMUSCULAR EVERY 4 HOURS PRN
OUTPATIENT
Start: 2025-05-07

## 2025-04-29 ASSESSMENT — PAIN SCALES - GENERAL: PAINLEVEL_OUTOF10: SEVERE PAIN (8)
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
## 2025-04-29 NOTE — PROGRESS NOTES
Patients left sided port accessed using non-coring, 19 gauge, 3/4 needle. Port accessed per facility protocol.  Hand hygiene performed: yes   Mask donned by caregiver: yes Site prepped with CHG: yes Labs drawn: yes Dressing applied using aseptic technique: yes Port flushed easily, without resistance. Flushed with 10 cc's normal saline.   Immediate blood return noted. 10 cc blood discarded.  3 vials blood draw and sent to lab for results.  Port flushed with 20 cc 0.9% normal saline and 5 cc heparinized saline.  Needle removed intact, sterile dressing applied.  Slight pressure applied for 30 seconds.   Patient tolerated port flush well, denies pain nor discomfort at this time. Patient instructed to leave dressing intact for a minimum of one hour, and to call with questions or concerns.  Patient states understanding and is in agreement with this plan. Patient discharged ambulatory. Nano Verma RN

## 2025-04-29 NOTE — ORAL ONC MGMT
Thank you for the opportunity to be a part in the care of this patient's oral chemotherapy. The oncology pharmacy will no longer be following this patient for oral chemotherapy. If there are any questions or the plan changes, feel free to contact us.    Eric Smallwood, PharmD  Oral Chemotherapy Pharmacist  [phone]

## 2025-04-29 NOTE — NURSING NOTE
"Oncology Rooming Note    April 29, 2025 8:40 AM   Tal Mcbride is a 69 year old male who presents for:    Chief Complaint   Patient presents with    Oncology Clinic Visit     Follow up Rectal cancer        Initial Vitals: /70   Pulse 90   Temp 97.5  F (36.4  C) (Tympanic)   Resp 20   Ht 1.753 m (5' 9\")   Wt 76.8 kg (169 lb 5 oz)   SpO2 98%   BMI 25.00 kg/m   Estimated body mass index is 25 kg/m  as calculated from the following:    Height as of this encounter: 1.753 m (5' 9\").    Weight as of this encounter: 76.8 kg (169 lb 5 oz). Body surface area is 1.93 meters squared.  Severe Pain (8) Comment: Data Unavailable   No LMP for male patient.  Allergies reviewed: Yes  Medications reviewed: Yes    Medications: Medication refills not needed today.  Pharmacy name entered into [employer]:    [facility] - [place], [place] - [address] [facility] - [place], [place] - [address]    Frailty Screening:   Is the patient here for a new oncology consult visit in cancer care? 2. No    PHQ9:  Did this patient require a PHQ9?: No      Clinical concerns: rectal pain and severe burning and open sores, sleeping 10 - 11 hours a day, on thee couch 15-20 hours a day to keep off of rectal area due to pain and burning.      Hermila Fletcher LPN             "

## 2025-04-29 NOTE — PROGRESS NOTES
MEDICAL ONCOLOGY FOLLOW-UP NOTE  Apr 29, 2025    Reason for follow-up: Rectal cancer    HISTORY OF PRESENT ILLNESS  Tal Mcbride is a 69 year old male with PMH as stated below who is seen in the oncology clinic for rectal cancer.    His history in short is as follows:    Mr. Mcbride started having diarrhea in the end of March 2024.  He was evaluated by his primary care and recommended to undergo a colonoscopy and upper endoscopy.    4/29/2024: EGD: 3 cm hiatal hernia.  Normal stomach.  Normal third portion of the duodenum.  Nonobstructing Schatzki's ring.    4/29/2024: Sigmoidoscopy: The digital rectal exam revealed a soft tissue mass palpated 8 cm from the anal verge.  The mass was noncircumferential and located predominantly at the posterior bowel wall.  An infiltrative partially obstructing large mass was found in the rectum.  The mass was circumferential.  The mass measured 4 cm in length.  No bleeding was present.  This was biopsied.  A pediatric colonoscope was passed past the area but patient had a large quantity of stool present just above the stricture so colonoscopy was not able to be completed.    Biopsy of the rectal mass showed moderately to poorly differentiated adenocarcinoma.  [facility] [place]    5/2/2024 CT chest with contrast: No definite findings for metastatic disease in the chest. 2 small pulmonary nodules measuring up to 4 mm unchanged from prior study     5/2/2024: MRI pelvis with and without contrast: A carcinoma involving the lower, middle and upper rectum extending over a length of roughly 12 cm demonstrated.  This lesion involves the mesorectum with no significant invasion anterolaterally on the right at the level of the mid rectum.  At this site infiltrative neoplasm is contactless with the posterior margin of the right seminal vesicle but it is not clearly invaded.  Neoplasm extends roughly 1.5 cm beyond the rectal margin.  Numerous enlarged mesorectal lymph nodes are demonstrated.  The larger  mesorectal lymph node is demonstrated on the right measuring 1.8X 1.5 cm.  Additional mesorectal lymph nodes with short axis measurements at least 1 cm demonstrated.  Inferior mesenteric lymph nodes with short axis measurement of 1.1 cm X 1.0 cm are noted.  A left internal iliac node measuring 1.2X 0.8 cm is demonstrated.  A 2.7 X1.8 centimeter right iliac node is demonstrated.  The other visualized bowel is unremarkable.  The prostate is mildly enlarged.  No free fluid is demonstrated.    5/13/2024: CT abdomen and pelvis without contrast: Large infiltrative rectal mass involving the upper middle and lower rectum.  There is surrounding inflammatory change and pelvic free fluid.  There is adjacent mesorectal adenopathy, a left internal iliac node as well.  Large amount of stool throughout the colon.  Enlarged prostate gland.  Left probable UPJ obstruction.    6/6/2024: Diverting colostomy      7/25/2024: PET scan: Masslike thickening with associated FDG avidity throughout the rectum is consistent with given history.  Metastatic disease within the liver, retroperitoneal nodes, perirectal nodes and inguinal nodes.  Findings are concerning for a UPJ obstruction.  Nuclear medicine renal Lasix scan for further characterize.    7/24/2024 to 12/23/2024:12 cycles of FOLFOX    12/18/2024: PET scan:IMPRESSION: Favorable response to therapy. Interval resolution of  hypermetabolism associated with a focal metastatic disease to the  liver as was seen previously and interval resolution of  hypermetabolism within the rectal lesion.    1/11/2025: Started  Capecitabine for maintenance    2/11/2025: Lower EUS:  There  is heavy ulceration with stricturing in the mid rectum     3/26/2025: PET scan:Significant interval worsening of disease, now with diffuse metastatic  disease of the thorax, abdomen and pelvis. Representative lesions as  described above.    4/4/2025: Colostomy revision and exam under anesthesia: Felt to have worsening  disease at the rectum therefore AN excision of the rectal mass was not performed.  A colostomy revision was performed.    Residual adenocarcinoma is present in the form of two (2) mesenteric peritoneal deposits, 4mm and 2 mm, respectively  Definitive treatment effect is not seen  Focal mucosal ulceration and granulation tissue-type polyp  Mucosal hemangiomas  Twelve (12) lymph nodes, negative for metastatic carcinoma (0/12)         Interim history:    Mr. Mcbride is doing well today.  He is having pain in his rectum.  He has burning pain and mucus discharge with blood when he has a bowel movement.  Not able to sit because of his pain.  Also has been dealing with fungal infection on his scrotum because of the discharge from his rectum       REVIEW OF SYSTEMS  A 12-point ROS negative except as in HPI      Current Outpatient Medications   Medication Sig Dispense Refill    acetaminophen (TYLENOL) 500 MG tablet Take 650 mg by mouth every 6 hours as needed for mild pain.      aspirin 81 MG EC tablet Take 81 mg by mouth daily      atorvastatin (LIPITOR) 20 MG tablet Take 20 mg by mouth daily      fluconazole (DIFLUCAN) 200 MG tablet Take 1 tablet (200 mg) by mouth daily. 14 tablet 0    gabapentin (NEURONTIN) 300 MG capsule Take 300 mg by mouth 3 times daily. For evening dose may take 2 capsules Currently taking Neurontin 2 po every 4 hours      glipiZIDE (GLUCOTROL XL) 2.5 MG 24 hr tablet Take 2.5 mg by mouth daily. Outside prescriber      hydrochlorothiazide (HYDRODIURIL) 25 MG tablet Take 25 mg by mouth daily      lidocaine-prilocaine (EMLA) 2.5-2.5 % external cream Apply topically daily as needed for moderate pain 30 g 1    lisinopril (ZESTRIL) 40 MG tablet Take 40 mg by mouth daily      metFORMIN (GLUCOPHAGE-XR) 750 MG 24 hr tablet Take 500 mg by mouth 2 times daily (with meals).      multivitamin w/minerals (MULTI-VITAMIN) tablet Take 1 tablet by mouth daily      Oxymetazoline HCl (NASAL DECONGESTANT SPRAY NA) [place] in  nostril as needed.      Polyethylene Glycol 3350 (MIRALAX PO) Take 1 Capful by mouth daily      traZODone (DESYREL) 100 MG tablet Take 100 mg by mouth at bedtime.      loperamide (IMODIUM A-D) 2 MG tablet Take 1 tablet (2 mg) by mouth 4 times daily as needed for diarrhea (Patient not taking: Reported on 4/29/2025) 90 tablet 0    nitroGLYcerin (RECTIV) 0.4 % OINT rectal ointment Apply 1 inch topically every 12 hours. (Patient not taking: Reported on 4/29/2025)      ondansetron (ZOFRAN ODT) 4 MG ODT tab Take 4 mg by mouth every 8 hours as needed for nausea. (Patient not taking: Reported on 4/29/2025)      oxyCODONE IR (ROXICODONE) 10 MG tablet Take 10 mg by mouth 2 times daily. (Patient not taking: Reported on 4/29/2025)      oxyCODONE-acetaminophen (PERCOCET) 5-325 MG tablet Take 1 tablet by mouth every 4 hours as needed for pain. (Patient not taking: Reported on 4/29/2025) 90 tablet 0       No Known Allergies  Immunization History   Administered Date(s) Administered    COVID-19 Bivalent 12+ (Pfizer) 10/27/2022    COVID-19 Monovalent 18+ (Moderna) 01/16/2021, 02/13/2021, 11/29/2021    Hepatitis B, Adult (Energix-B/Recombivax HB) 10/03/2013, 03/28/2014, 12/30/2014    Influenza Vaccine (Flucelvax Quadrivalent) 10/07/2022    Pneumococcal 23 valent 10/03/2013    TDAP (Adacel,Boostrix) 10/03/2012       Past Medical History:   Diagnosis Date    Adolescent idiopathic scoliosis of thoracolumbar region 11/16/2017    Bunion 03/28/2013    Essential hypertension 03/05/2012    Formatting of this note might be different from the original.   Epic      Pure hypercholesterolemia 10/04/2012    Rectal cancer (H) 05/29/2024    Scoliosis     Severe protein-calorie malnutrition 06/07/2024    Type 2 diabetes mellitus without complication (H) 06/21/2012       Past Surgical History:   Procedure Laterality Date    CATARACT EXTRACTION W/ INTRAOCULAR LENS IMPLANT Left 2021    ENDOSCOPY N/A     upper and lower    GI SURGERY       "PHACOEMULSIFICATION WITH STANDARD INTRAOCULAR LENS IMPLANT Right 09/29/2021    Procedure: right Cataract Extraction with Implant;  Surgeon: Juan Prado MD;  Location: [facility] OR    robotic colostomy N/A 06/06/2024       SOCIAL HISTORY  History   Smoking Status    Former    Types: Cigarettes   Smokeless Tobacco    Former    Social History    Substance and Sexual Activity      Alcohol use: Not Currently     History   Drug Use Unknown       FAMILY HISTORY  Family History   Problem Relation Age of Onset    Hypertension Mother     Pancreatic Cancer Father 55    Hypertension Sister     Other Problems  (knee replacement) Sister         bilateal knee replacement    Other Problems  (hip replacement) Sister         bilateral hip replacement    Diabetes No family hx of     Coronary Artery Disease No family hx of     Hyperlipidemia No family hx of     Cerebrovascular Disease No family hx of     Breast Cancer No family hx of     Colon Cancer No family hx of     Prostate Cancer No family hx of     Anesthesia Reaction No family hx of     Asthma No family hx of     Genetic Disorder No family hx of     Thyroid Disease No family hx of        PHYSICAL EXAMINATION  /70   Pulse 90   Temp 97.5  F (36.4  C) (Tympanic)   Resp 20   Ht 1.753 m (5' 9\")   Wt 76.8 kg (169 lb 5 oz)   SpO2 98%   BMI 25.00 kg/m    Wt Readings from Last 2 Encounters:   04/29/25 76.8 kg (169 lb 5 oz)   03/13/25 76.6 kg (168 lb 14 oz)     Physical Exam  Constitutional:       Appearance: Normal appearance.   Abdominal:      General: Abdomen is flat.   Genitourinary:     Comments: Swelling seen in scrotum and whitish discharge over the scrotal skin.  Neurological:      General: No focal deficit present.      Mental Status: He is alert and oriented to person, place, and time.   Psychiatric:         Mood and Affect: Mood normal.       Laboratory and imaging:     Latest Reference Range & Units 03/13/25 08:20   WBC 4.0 - 11.0 10e3/uL 11.5 (H) "   Hemoglobin 13.3 - 17.7 g/dL 11.1 (L)   Hematocrit 40.0 - 53.0 % 32.4 (L)   Platelet Count 150 - 450 10e3/uL 294   (H): Data is abnormally high  (L): Data is abnormally low    Foundation one: no reportable RNA variants EGFR amplification KRAS wildtype NRAS wildtype APC * ARIDIA * TP53 splice site 560-2A>G Diseas  e relevant genes with no reportable alterations: BRAF, ERBB2, KRAS, NRAS.    ASSESSMENT AND PLAN    1.  Stage IV rectal cancer: K-pastora wild-type, pMMR    Initially presented with diarrhea in March 2024.  Sigmoidoscopy showed rectal mass infiltrative partially obstructing large mass 8 cm from the anal verge.  MRI pelvis showed carcinoma involving lower, middle and upper rectum extending over a length of roughly 12 cm.  Lesion involves the mesorectum but most significant invasion anterolaterally on the right at the level of the mid rectum.  Neoplasm is contiguous with the posterior margin of the right seminal vesicle but is not clearly invading it.  Extends roughly 1.5 cm beyond the rectal margin.  It also showed numerous enlarged mesorectal lymph nodes.  A inferior mesenteric lymph node was also noted.  There were also enlarged left iliac nodes and right iliac node.    He has already had a diverting ostomy by  on 6/6/2024.    He was then found to have metastatic disease on PET scan.   PET scan showed 5 foci of abnormal avidity present within the liver, involvement of the common iliac lymph node, upper abdominal para-aortic lymph node, right common iliac node and left perirectal node.  A few inguinal nodes are also avid.    He has completed 12 cycles of FOLFOX from 7/2024-12/ 2024.  PET scan 9/12/2024 showed very good response.  For his convenience as he was having difficulty with the 5-FU pump we made a decision to switch to oral capecitabine for maintenance.  He then started maintenance capecitabine Jan 2025    He started treatment on 1/11/2025 but was held when he went to ED on  2/3/2025. He is doing better now. Bleeding has improved.    He has met with his colorectal surgeon, Dr. Cordova for discussion regarding APR with a permanent colostomy.  Given that his quality of life with his current ostomy is not good as he sometimes has leakage from the ostomy I think this would be a good decision.  He then was taken to surgery on 4/4/2025.  Given the extent of the rectal mass a APR could not be performed however he did have an ostomy revision.    He does have progression systemically on PET scan done 3/26/2025.  We discussed we will have to resume 5-FU with oxaliplatin again.  Does have some borderline neuropathy and therefore we will dose reduce the oxaliplatin to 65 mg/m .  He would like to go back to the pump instead of continuing with capecitabine.  We will give his ostomy some time to heal and plan to start week of May 5.    I will also refer him to radiation to discuss palliative radiation if chemo is not able to work fast enough to shrink the rectal mass to give him symptom relief.    2.  Fungal infection involving scrotum:    Continues to have discharge from his rectum which is causing a moist environment near his scrotum leading to fungal infection.  Has tried topical treatment without any benefit.  Did prescribe fluconazole today to see if helps.  Plan for treatment for 2 weeks.  However if there is no improvement in a few days he will let us know.    Total time spent on the patient on day of encounter was 30 minutes doing chart review, review of test results, interpretation of results, patient visit and documentation.       Shawna Poon MD

## 2025-04-30 ENCOUNTER — DOCUMENTATION ONLY (OUTPATIENT)
Dept: INFUSION THERAPY | Facility: OTHER | Age: 70
End: 2025-04-30

## 2025-04-30 ENCOUNTER — ENROLLMENT (OUTPATIENT)
Dept: HOME HEALTH SERVICES | Facility: HOME HEALTH | Age: 70
End: 2025-04-30
Payer: COMMERCIAL

## 2025-05-01 NOTE — PROGRESS NOTES
[facility] Chemotherapy Consult    The inpatient pharmacist has been consulted to review the patient's chart for  the following: any significant drug interactions between home medications, new take home medications, pre-medications, PRN medications, chemotherapy agents, and chemotherapy regimen.     Current Outpatient Medications   Medication Sig Dispense Refill    acetaminophen (TYLENOL) 500 MG tablet Take 650 mg by mouth every 6 hours as needed for mild pain.      aspirin 81 MG EC tablet Take 81 mg by mouth daily      atorvastatin (LIPITOR) 20 MG tablet Take 20 mg by mouth daily      fluconazole (DIFLUCAN) 200 MG tablet Take 1 tablet (200 mg) by mouth daily. 14 tablet 0    gabapentin (NEURONTIN) 300 MG capsule Take 300 mg by mouth 3 times daily. For evening dose may take 2 capsules Currently taking Neurontin 2 po every 4 hours      glipiZIDE (GLUCOTROL XL) 2.5 MG 24 hr tablet Take 2.5 mg by mouth daily. Outside prescriber      hydrochlorothiazide (HYDRODIURIL) 25 MG tablet Take 25 mg by mouth daily      lidocaine-prilocaine (EMLA) 2.5-2.5 % external cream Apply topically daily as needed for moderate pain 30 g 1    lisinopril (ZESTRIL) 40 MG tablet Take 40 mg by mouth daily      loperamide (IMODIUM A-D) 2 MG tablet Take 1 tablet (2 mg) by mouth 4 times daily as needed for diarrhea (Patient not taking: Reported on 4/29/2025) 90 tablet 0    metFORMIN (GLUCOPHAGE-XR) 750 MG 24 hr tablet Take 500 mg by mouth 2 times daily (with meals).      multivitamin w/minerals (MULTI-VITAMIN) tablet Take 1 tablet by mouth daily      nitroGLYcerin (RECTIV) 0.4 % OINT rectal ointment Apply 1 inch topically every 12 hours. (Patient not taking: Reported on 4/29/2025)      ondansetron (ZOFRAN ODT) 4 MG ODT tab Take 4 mg by mouth every 8 hours as needed for nausea. (Patient not taking: Reported on 4/29/2025)      oxyCODONE IR (ROXICODONE) 10 MG tablet Take 10 mg by mouth 2 times daily. (Patient not taking: Reported on  4/29/2025)      oxyCODONE-acetaminophen (PERCOCET) 5-325 MG tablet Take 1 tablet by mouth every 4 hours as needed for pain. (Patient not taking: Reported on 4/29/2025) 90 tablet 0    Oxymetazoline HCl (NASAL DECONGESTANT SPRAY NA) Spray in nostril as needed.      Polyethylene Glycol 3350 (MIRALAX PO) Take 1 Capful by mouth daily      traZODone (DESYREL) 100 MG tablet Take 100 mg by mouth at bedtime.       No current facility-administered medications for this visit.       Take home medication(s): Zofran, Compazine, Decadron    Pre-Treat(s): Emend, dexamethasone, Zofran    PRN medication(s): Ativan, Pepcid    Chemotherapy agent(s): oxaliplatin, leucovorin, fluorouracil    Cancer Being Treated: colorectal    The following interactions were found and will require patient education and/or provider assessment:     Drug-Drug interactions: Concurrent use of OXALIPLATIN and QT INTERVAL PROLONGING DRUGS such as DIFLUCAN, TRAZODONE, and/or ZOFRAN may result in increased risk of QT-interval prolongation.     Drug-Allergy interactions:  None    Drug-Food interactions: No significant interactions between food and chemotherapy agents.     Drug-Ethanol interactions: No significant interactions between ethanol and chemotherapy agents.     Drug-Tobacco interactions: Concurrent use of ZOFRAN and TOBACCO may result in reduced ZOFRAN exposure.     If there are any additional questions or concerns, please contact the inpatient pharmacy ([phone]) for further review.     Renato Parr RPH  April 30, 2025    no

## 2025-05-02 ENCOUNTER — HOME INFUSION BILLING (OUTPATIENT)
Dept: HOME HEALTH SERVICES | Facility: HOME HEALTH | Age: 70
End: 2025-05-02
Payer: COMMERCIAL

## 2025-05-02 PROCEDURE — E1399 DURABLE MEDICAL EQUIPMENT MI: HCPCS

## 2025-05-06 RX ORDER — DEXAMETHASONE 4 MG/1
8 TABLET ORAL DAILY
Qty: 4 TABLET | Refills: 2 | Status: SHIPPED | OUTPATIENT
Start: 2025-05-06

## 2025-05-06 RX ORDER — PROCHLORPERAZINE MALEATE 10 MG
10 TABLET ORAL EVERY 6 HOURS PRN
Qty: 30 TABLET | Refills: 2 | Status: SHIPPED | OUTPATIENT
Start: 2025-05-06

## 2025-05-06 RX ORDER — ONDANSETRON 8 MG/1
8 TABLET, FILM COATED ORAL EVERY 8 HOURS PRN
Qty: 30 TABLET | Refills: 2 | Status: SHIPPED | OUTPATIENT
Start: 2025-05-06

## 2025-05-06 NOTE — PROGRESS NOTES
Attempted to schedule patient pump off in [place] on Friday 5/9/25.  The nurse who works in the out patient infusion center works in Cardiac rehab that day and will not be able remove the pump.  Patient called and notified that pump off will need to take place at the infusion center in [place].  We will work on pushing his cycle back one day for future doses if possible- he can have his pumps off in [place] on Thursday's.

## 2025-05-07 ENCOUNTER — INFUSION THERAPY VISIT (OUTPATIENT)
Dept: INFUSION THERAPY | Facility: OTHER | Age: 70
End: 2025-05-07
Attending: INTERNAL MEDICINE
Payer: COMMERCIAL

## 2025-05-07 ENCOUNTER — CARE COORDINATION (OUTPATIENT)
Dept: CARE COORDINATION | Facility: OTHER | Age: 70
End: 2025-05-07

## 2025-05-07 VITALS
WEIGHT: 167.55 LBS | SYSTOLIC BLOOD PRESSURE: 118 MMHG | HEIGHT: 69 IN | OXYGEN SATURATION: 98 % | TEMPERATURE: 97.1 F | DIASTOLIC BLOOD PRESSURE: 56 MMHG | HEART RATE: 70 BPM | RESPIRATION RATE: 20 BRPM | BODY MASS INDEX: 24.82 KG/M2

## 2025-05-07 DIAGNOSIS — C20 RECTAL CANCER (H): Primary | ICD-10-CM

## 2025-05-07 LAB
ALBUMIN SERPL BCG-MCNC: 3.8 G/DL (ref 3.5–5.2)
ALP SERPL-CCNC: 108 U/L (ref 40–150)
ALT SERPL W P-5'-P-CCNC: 10 U/L (ref 0–70)
ANION GAP SERPL CALCULATED.3IONS-SCNC: 17 MMOL/L (ref 7–15)
AST SERPL W P-5'-P-CCNC: 29 U/L (ref 0–45)
BASOPHILS # BLD AUTO: 0.1 10E3/UL (ref 0–0.2)
BASOPHILS NFR BLD AUTO: 1 %
BILIRUB SERPL-MCNC: 0.4 MG/DL
BUN SERPL-MCNC: 30 MG/DL (ref 8–23)
CALCIUM SERPL-MCNC: 9.5 MG/DL (ref 8.8–10.4)
CHLORIDE SERPL-SCNC: 100 MMOL/L (ref 98–107)
CREAT SERPL-MCNC: 1.75 MG/DL (ref 0.67–1.17)
EGFRCR SERPLBLD CKD-EPI 2021: 42 ML/MIN/1.73M2
EOSINOPHIL # BLD AUTO: 0.4 10E3/UL (ref 0–0.7)
EOSINOPHIL NFR BLD AUTO: 3 %
ERYTHROCYTE [DISTWIDTH] IN BLOOD BY AUTOMATED COUNT: 14.8 % (ref 10–15)
GLUCOSE SERPL-MCNC: 158 MG/DL (ref 70–99)
HCO3 SERPL-SCNC: 20 MMOL/L (ref 22–29)
HCT VFR BLD AUTO: 30.9 % (ref 40–53)
HGB BLD-MCNC: 10.3 G/DL (ref 13.3–17.7)
IMM GRANULOCYTES # BLD: 0.1 10E3/UL
IMM GRANULOCYTES NFR BLD: 1 %
LYMPHOCYTES # BLD AUTO: 1.4 10E3/UL (ref 0.8–5.3)
LYMPHOCYTES NFR BLD AUTO: 10 %
MCH RBC QN AUTO: 31 PG (ref 26.5–33)
MCHC RBC AUTO-ENTMCNC: 33.3 G/DL (ref 31.5–36.5)
MCV RBC AUTO: 93 FL (ref 78–100)
MONOCYTES # BLD AUTO: 0.8 10E3/UL (ref 0–1.3)
MONOCYTES NFR BLD AUTO: 6 %
NEUTROPHILS # BLD AUTO: 11.8 10E3/UL (ref 1.6–8.3)
NEUTROPHILS NFR BLD AUTO: 81 %
NRBC # BLD AUTO: 0 10E3/UL
NRBC BLD AUTO-RTO: 0 /100
PLATELET # BLD AUTO: 342 10E3/UL (ref 150–450)
POTASSIUM SERPL-SCNC: 4.4 MMOL/L (ref 3.4–5.3)
PROT SERPL-MCNC: 7.1 G/DL (ref 6.4–8.3)
RBC # BLD AUTO: 3.32 10E6/UL (ref 4.4–5.9)
SODIUM SERPL-SCNC: 137 MMOL/L (ref 135–145)
WBC # BLD AUTO: 14.6 10E3/UL (ref 4–11)

## 2025-05-07 PROCEDURE — 250N000011 HC RX IP 250 OP 636: Performed by: INTERNAL MEDICINE

## 2025-05-07 PROCEDURE — 258N000003 HC RX IP 258 OP 636: Performed by: INTERNAL MEDICINE

## 2025-05-07 PROCEDURE — 80048 BASIC METABOLIC PNL TOTAL CA: CPT | Mod: ZL | Performed by: INTERNAL MEDICINE

## 2025-05-07 PROCEDURE — A4222 INFUSION SUPPLIES WITH PUMP: HCPCS

## 2025-05-07 PROCEDURE — 36591 DRAW BLOOD OFF VENOUS DEVICE: CPT | Performed by: INTERNAL MEDICINE

## 2025-05-07 PROCEDURE — 85004 AUTOMATED DIFF WBC COUNT: CPT | Mod: ZL | Performed by: INTERNAL MEDICINE

## 2025-05-07 RX ORDER — ONDANSETRON 2 MG/ML
8 INJECTION INTRAMUSCULAR; INTRAVENOUS ONCE
Status: COMPLETED | OUTPATIENT
Start: 2025-05-07 | End: 2025-05-07

## 2025-05-07 RX ORDER — FLUOROURACIL 50 MG/ML
400 INJECTION, SOLUTION INTRAVENOUS ONCE
Status: COMPLETED | OUTPATIENT
Start: 2025-05-07 | End: 2025-05-07

## 2025-05-07 RX ADMIN — OXALIPLATIN 130 MG: 5 INJECTION, SOLUTION INTRAVENOUS at 09:47

## 2025-05-07 RX ADMIN — LEUCOVORIN CALCIUM 700 MG: 500 INJECTION, POWDER, LYOPHILIZED, FOR SOLUTION INTRAMUSCULAR; INTRAVENOUS at 09:47

## 2025-05-07 RX ADMIN — FLUOROURACIL 770 MG: 50 INJECTION, SOLUTION INTRAVENOUS at 11:54

## 2025-05-07 RX ADMIN — DEXTROSE MONOHYDRATE 250 ML: 50 INJECTION, SOLUTION INTRAVENOUS at 08:59

## 2025-05-07 RX ADMIN — FOSAPREPITANT: 150 INJECTION, POWDER, LYOPHILIZED, FOR SOLUTION INTRAVENOUS at 09:06

## 2025-05-07 RX ADMIN — ONDANSETRON 8 MG: 2 INJECTION INTRAMUSCULAR; INTRAVENOUS at 09:01

## 2025-05-07 NOTE — PROGRESS NOTES
Infusion Nursing Note:  Tal Mcbride presents today for folfox.    Patient seen by provider today: No   present during visit today: Not Applicable.    Note: N/A.      Intravenous Access:  Labs drawn without difficulty.  Implanted Port.    Treatment Conditions:  Results reviewed, labs MET treatment parameters, ok to proceed with treatment.      Component      Latest Ref Rng 5/7/2025  8:16 AM   WBC      4.0 - 11.0 10e3/uL 14.6 (H)    RBC Count      4.40 - 5.90 10e6/uL 3.32 (L)    Hemoglobin      13.3 - 17.7 g/dL 10.3 (L)    Hematocrit      40.0 - 53.0 % 30.9 (L)    MCV      78 - 100 fL 93    MCH      26.5 - 33.0 pg 31.0    MCHC      31.5 - 36.5 g/dL 33.3    RDW      10.0 - 15.0 % 14.8    Platelet Count      150 - 450 10e3/uL 342    % Neutrophils      % 81    % Lymphocytes      % 10    % Monocytes      % 6    % Eosinophils      % 3    % Basophils      % 1    % Immature Granulocytes      % 1    NRBC/W      <1 /100 0    Absolute Neutrophil      1.6 - 8.3 10e3/uL 11.8 (H)    Absolute Lymphocytes      0.8 - 5.3 10e3/uL 1.4    Absolute Monocytes      0.0 - 1.3 10e3/uL 0.8    Absolute Eosinophils      0.0 - 0.7 10e3/uL 0.4    Absolute Basophils      0.0 - 0.2 10e3/uL 0.1    Absolute Immature Granulocytes      <=0.4 10e3/uL 0.1    Absolute NRBCs      10e3/uL 0.0    Sodium      135 - 145 mmol/L 137    Potassium      3.4 - 5.3 mmol/L 4.4    Carbon Dioxide (CO2)      22 - 29 mmol/L 20 (L)    Anion Gap      7 - 15 mmol/L 17 (H)    Urea Nitrogen      8.0 - 23.0 mg/dL 30.0 (H)    Creatinine      0.67 - 1.17 mg/dL 1.75 (H)    GFR Estimate      >60 mL/min/1.73m2 42 (L)    Calcium      8.8 - 10.4 mg/dL 9.5    Chloride      98 - 107 mmol/L 100    Glucose      70 - 99 mg/dL 158 (H)    Alkaline Phosphatase      40 - 150 U/L 108    AST      0 - 45 U/L 29    ALT      0 - 70 U/L 10    Protein Total      6.4 - 8.3 g/dL 7.1    Albumin      3.5 - 5.2 g/dL 3.8    Bilirubin Total      <=1.2 mg/dL 0.4       Legend:  (H) High  (L)  "Low    Post Infusion Assessment:  Patient tolerated infusion without incident.  Site patent and intact, free from redness, edema or discomfort.  No evidence of extravasations.     Prior to discharge: Port is secured in place with tegaderm and flushed with 10cc NS with positive blood return noted.    Continuous home infusion CADD pump connected.    All connectors secured in place and clamps taped open.    Pump started, \"running\" noted on display (CADD): YES.   Capillary element taped to pt's skin per protocol.  Patient instructed to call our clinic or [facility] with any questions or concerns at home.  Patient verbalized understanding.    Patient set up for pump disconnect at our clinic on 5/9/25.         [facility] delivery ticket secure emailed to [facility].      "

## 2025-05-07 NOTE — PROGRESS NOTES
Care Coordination Focused Note:    Attempted to meet with pt during infusion appointment; however, patient was sleeping. Writer was unable to check back in w/pt before his infusion was complete. Will attempt again at a future visit.

## 2025-05-08 ENCOUNTER — INFUSION THERAPY VISIT (OUTPATIENT)
Dept: INFUSION THERAPY | Facility: OTHER | Age: 70
End: 2025-05-08
Attending: INTERNAL MEDICINE
Payer: COMMERCIAL

## 2025-05-08 ENCOUNTER — TELEPHONE (OUTPATIENT)
Dept: ONCOLOGY | Facility: OTHER | Age: 70
End: 2025-05-08

## 2025-05-08 DIAGNOSIS — C20 RECTAL CANCER (H): Primary | ICD-10-CM

## 2025-05-08 NOTE — PROGRESS NOTES
Infusion Nursing Note:  Tal Mcbride presents today for home infusion pump check.      Note: per patient pump started beeping/stopped running around 0230 this morning until 0530, on phone with I to restart infusion.   Pump started beeping/stopped running  again this AM, spoke with [facility] again, per I likely a faulty sensor on pump. See [facility] note for details.     Pump exchanged with assistance from [facility] per pump patient has 29 hours running time left.    Patient questioning what time to come in tomorrow for pump-off.    Above reported to ROSALIE PLATT patient to continue with pump off 46 hours after initially starting infusion. Patient scheduled 5/9/25 1030.    Patient notified of above, in agreement with plan of care.    Discharge Plan:   Patient discharged in stable condition accompanied by: wife.  Departure Mode: Ambulatory.

## 2025-05-08 NOTE — TELEPHONE ENCOUNTER
Patient here yesterday for C1 D1 Folfox and sent home on Fluorouracil 46 home infuser.    Per wife's call this am, his pump starting beeping/stopped running around 230am. They spent until 530am on the phone with I before they got it running again, so they lost about 3 hours.    Do we keep his pump off time at the 46 hour hesham regardless of the delay or do we push out his pump off 3 hours to allow him to receive entirety of drug?

## 2025-05-08 NOTE — TELEPHONE ENCOUNTER
"Follow up call from wife, pump is beeping again saying same thing it did this am \"cassette not attached.\" She doesn't feel calling I again will bring a different outcome and would like to come in for infusion nurse to eval and wants a new pump. Advised we do have a spare pump available. They can be here by 1130. Call to PAC to update need for appt.   "

## 2025-05-08 NOTE — PROGRESS NOTES
Triage Note/Care Coordination    Identify the person you spoke with and their relationship to the patient: spouse    Reason for the call: Administration problem/concern    Other    Assessment: Call from spouse, pump complaining of cassette not properly attached. With RN direction spouse states clamped line, disconnected tubing, reattached, restarted pump, no change, pump still not accepting tubing as latched. Conclusion: likely faulty sensor on pump.    Interventions/Recommendation/Comments: Return to IC for replacement of pump    Outcomes:     What is the plan for ongoing care of this patient: Patient requires additional assessment in clinic, ED, etc.     Was there harm, averted harm, or unexpected death of the patient? No    Does the patient/caregiver verbalize agreement with the plan? Yes

## 2025-05-12 PROBLEM — E78.5 HYPERLIPIDEMIA: Status: ACTIVE | Noted: 2025-05-12

## 2025-05-12 PROBLEM — C20 MALIGNANT NEOPLASM OF RECTUM (H): Status: ACTIVE | Noted: 2025-03-13

## 2025-05-13 ENCOUNTER — HOME INFUSION (OUTPATIENT)
Dept: HOME HEALTH SERVICES | Facility: HOME HEALTH | Age: 70
End: 2025-05-13
Payer: COMMERCIAL

## 2025-05-13 DIAGNOSIS — C20 RECTAL CANCER (H): ICD-10-CM

## 2025-05-15 NOTE — PROGRESS NOTES
"Radiation Oncology Rooming Note    May 19, 2025 9:30 AM     Tal Mcbride is a 69 year old male who presents for:       Chief Complaint   Patient presents with    Consult     Radiation Oncology Consultation        Initial Vitals: /60 (BP Location: Right arm, Patient Position: Chair, Cuff Size: Adult Regular)   Pulse 72   Temp 98.3  F (36.8  C) (Tympanic)   Resp 16   Ht 1.727 m (5' 8\")   Wt 76.2 kg (168 lb)   SpO2 96%   BMI 25.54 kg/m     Estimated body mass index is 25.54 kg/m  as calculated from the following:    Height as of this encounter: 1.727 m (5' 8\").    Weight as of this encounter: 76.2 kg (168 lb).   Body surface area is 1.91 meters squared.  Moderate Pain (4) Comment: Data Unavailable       Allergies reviewed: Yes  Medications reviewed: Yes      Patient completed the following questionnaires: PHQ-9 and NCCN Distress Thermometer.       Patient was assessed using the NCCN psychosocial distress thermometer. Patient rated the score as a 2. Patient rated current stressors as pain and sleep.       Patient received folder containing the following:  [facility] booklet  radiation planning process packet  radiation therapy timeline  financial letter  vaccines during cancer treatment hand out  mental health services and providers packet  cancer rehab information handout   business card  radiation therapy business card      Financial assistance resources given to patient:  [employer] application   [employer] application      Patient given site specific instructions including:   bladder/bowel instructions      Prior radiation therapy: None    Prior chemotherapy:   Protocol: FOLFOX  Facility: [facility]  Dates: 7/24/2024-12/23/2024, 12 cycles    1/11/2025 started Capecitabine    Protocol:  FOLFOX  Facility:  [facility]  Dates:  5/7/2025-present    Prior hormonal therapy:N/A      He denies financial concerns.  He denies transportation issues.   " "      Nutrition Assessment    Height: 5' 8\" Weight: 168 lbs 0 oz    Usual Weight: 168#      Weight Change: 0      Past Medical History:   Diagnosis Date    Adolescent idiopathic scoliosis of thoracolumbar region 11/16/2017    Bunion 03/28/2013    Essential hypertension 03/05/2012    Formatting of this note might be different from the original.   Epic      Hyperlipidemia 05/12/2025    Malignant neoplasm of rectum (H) 03/13/2025    Metastasis from rectal cancer (H)     Pure hypercholesterolemia 10/04/2012    Rectal cancer (H) 05/29/2024    Scoliosis     Severe protein-calorie malnutrition 06/07/2024    Type 2 diabetes mellitus without complication (H) 06/21/2012       1. Receiving Chemotherapy? Yes  - 1 point  2. Weight Loss per Month (in pounds) Based on Usual Weight: 0    100# 100-120# 120-150# 150-200# greater than 200# Pt. System   greater than 5 5 - 6 6 - 8 7 - 10 greater than 10 1 Point   greater than 7 7- 9 9 - 11 11 - 14 greater than 15 2 Points   greater than 10 10 - 12 12 - 15 15 - 20 greater than 20 3 Points       3. Eating Problems: ( 1 Point for Each Problem)       Loss of Appetite     No - 0 points    Difficulty Swallowing    No - 0 points   Nausea    No - 0 points    Early Satiety    No - 0 points   Vomiting    No - 0 points    Taste/Smell Aversions  No - 0 points    Diarrhea    No - 0 points    Esophageal Reflux   No - 0 points   Mouth Soreness/Difficulty Chewing  No - 0 points          Total: 0    4.  Automatic Referral for the Following Diagnosis or Situations: NA     Comments: Patient states that weight has been stable for several months.      Total Score: 1 (Scores greater than or equal to 4 will receive a Dietician Consult)      Marie Collado RN  "

## 2025-05-16 ENCOUNTER — HOME INFUSION BILLING (OUTPATIENT)
Dept: HOME HEALTH SERVICES | Facility: HOME HEALTH | Age: 70
End: 2025-05-16
Payer: COMMERCIAL

## 2025-05-16 PROCEDURE — E1399 DURABLE MEDICAL EQUIPMENT MI: HCPCS

## 2025-05-19 ENCOUNTER — ONCOLOGY VISIT (OUTPATIENT)
Dept: RADIATION ONCOLOGY | Facility: HOSPITAL | Age: 70
End: 2025-05-19
Attending: DENTIST
Payer: COMMERCIAL

## 2025-05-19 ENCOUNTER — QUESTIONNAIRE SERIES SUBMISSION (OUTPATIENT)
Dept: INFUSION THERAPY | Facility: OTHER | Age: 70
End: 2025-05-19

## 2025-05-19 VITALS
BODY MASS INDEX: 25.46 KG/M2 | RESPIRATION RATE: 16 BRPM | TEMPERATURE: 98.3 F | HEIGHT: 68 IN | SYSTOLIC BLOOD PRESSURE: 122 MMHG | OXYGEN SATURATION: 96 % | HEART RATE: 72 BPM | WEIGHT: 168 LBS | DIASTOLIC BLOOD PRESSURE: 60 MMHG

## 2025-05-19 DIAGNOSIS — C20 RECTAL CANCER (H): ICD-10-CM

## 2025-05-19 PROCEDURE — 99417 PROLNG OP E/M EACH 15 MIN: CPT | Performed by: RADIOLOGY

## 2025-05-19 PROCEDURE — 99215 OFFICE O/P EST HI 40 MIN: CPT | Performed by: RADIOLOGY

## 2025-05-19 PROCEDURE — G0463 HOSPITAL OUTPT CLINIC VISIT: HCPCS

## 2025-05-19 ASSESSMENT — PATIENT HEALTH QUESTIONNAIRE - PHQ9: SUM OF ALL RESPONSES TO PHQ QUESTIONS 1-9: 2

## 2025-05-19 ASSESSMENT — PAIN SCALES - GENERAL: PAINLEVEL_OUTOF10: MODERATE PAIN (4)

## 2025-05-19 NOTE — NURSING NOTE
Telephone call to patient to discuss with patient the responses to his Es[employer] questionnaire.  Patient did not answer, this writer left VM with contact information.  Will continue to attempt to reach out to patient today until we are able to connect.

## 2025-05-19 NOTE — PROGRESS NOTES
Radiation oncology visit note    Requesting physician: Dr. Shawna Poon    Diagnosis: Progressive symptomatic invasive moderately differentiated rectal adenocarcinoma, status post flexible sigmoidoscopy on April 29, 2024, robotic colostomy on June 6, 2024, 12 cycles of FOLFOX chemotherapy, completing on December 23, 2024, maintenance capecitabine starting on January 11, 2025, anal exam under anesthesia and colostomy revision for severe prolapse on April 4, 2025, starting FOLFOX chemotherapy on May 7, 2025    Performance Status: ECOG 2    Reason for Visit  treatment options regarding the role of palliative radiation treatment to alleviate his pain symptom associated with progressive metastatic rectal cancer    History  Mr. Tal Mcbride was seen today at the request of Dr. Shawna Poon.  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    He underwent lower EUS by Dr. Tereso Lau on February 11, 2025.  By an operative note, digital rectal examination was remarkable for abnormality both in and above the anal canal, terminating in stricture.  Concentric ulceration was noted and the scope was advanced to roughly 8 cm above the external margin of the anal canal due to stricture.  Ulceration was diffuse, essentially extending directly down to the anal canal itself.  Biopsy of ulcerated area was obtained.  There was significant contact friability in the entire rectum.  Ulceration and stricture in the mid rectum precluded advancement of the scope to more than 8 cm above the external margin of the anal canal.  Pathology report was remarkable for invasive moderately differentiated adenocarcinoma, similar  to those seen in the concurrent FNA specimen, showing adenocarcinoma.    He saw Dr. Paula Cordova on March 4, 2025.  Follow-up FDG PET/CT study on March 26, 2025 was remarkable for significant interval worsening of disease with diffuse metastatic disease involving thorax, abdomen, and pelvis, without abnormal FDG uptake involving bony structures.    He underwent anal exam under anesthesia and colostomy revision for severe prolapse on April 4, 2025.  By an operative note, the cancer was noted on both sides of anus and on the left anterior port outside the resection area.  The cancer was unresectable.  Pathology of colostomy revision showed residual adenocarcinoma present in 2 mesenteric peritoneal deposits, 4 mm and 2 mm, respectively without definitive treatment effect.  0 of 12 lymph nodes was positive for metastatic carcinoma.    CEA on April 29, 2025 was 75.9 NG/mL, compared to 27.4 NG/mL on March 13, 2025, 15.3 NG/mL on February 20, 2025, 7.4 NG/mL on January 30, 2025, and 5.1 NG/mL on December 23, 2024. He started 5-FU and oxaliplatin, starting on May 7, 2025.     He has had worsening pelvic/perineal pain, bloody discharge with bowel movement, and irritation involving perianal area/perineum and scrotal areas.  He started on fluconazole for fungal infection involving perineal/genital areas.    On examination, he is alert, cooperative, and in no acute distress. Psychiatric: mood and affect are appropriate. Cardiac: Regular rate and rhythm. S1, S2. Lungs: Normal breath sounds without rales or rhonchi. Abdomen: Soft and nontender.  Colostomy site is unremarkable and without bloody stool.  Bowel sounds are active.  Examination of the perianal and anal area is remarkable for ulcerative cutaneous infiltrating rectal cancer involving perianal/intergluteal and anal areas as well as scrotal areas with associated penile edema.  Rectal examination is deferred due to significant pain symptom.  Extremities: Without  clubbing, cyanosis. Musculoskeletal: Unremarkable for bony tenderness on palpation. There is no limitation in range of motion.     Data  I personally reviewed the patient's medical records, procedure/operative notes, pathology reports, laboratory work and radiology studies as detailed above including the discussion of the results with the patient.     Assessment  The patient is a 69-year-old gentleman with a diagnosis of progressive symptomatic invasive moderately differentiated rectal adenocarcinoma, status post flexible sigmoidoscopy on April 29, 2024, robotic colostomy on June 6, 2024, 12 cycles of FOLFOX chemotherapy, completing on December 23, 2024, maintenance capecitabine starting on January 11, 2025, anal exam under anesthesia and colostomy revision for severe prolapse on April 4, 2025, starting FOLFOX chemotherapy on May 7, 2025.    I believe that he is a candidate for palliative radiation treatment with the goal to alleviate pain symptom associated with progressive metastatic rectal cancer involving pelvis and perianal/intergluteal, perineal, scrotal soft tissues.    I discussed with the patient the role of palliative radiation treatment to alleviate his pain symptom associated with progressive metastatic rectal cancer, technical details of image guided intensity modulated/volumetric modulated arc therapy, time course of palliative hypofractionated pelvic radiation treatment, anticipated outcomes following palliative radiation treatment, treatment alternatives of no radiation treatment, systemic treatment, medical treatment, comfort care, as well as potential short-term and long-term radiation related adverse reaction which includes but is not limited to general fatigue, skin irritation, redness, tanning and peeling, skin desquamation, pain associated with radiation related skin side effects, bowel irritation causing abdominal cramping, loose stool, diarrhea, urgency to have a bowel movement, blood in the  stool, bladder irritation causing frequent urination, bladder spasm, burning with urination, and blood in the urine, long term bowel pattern changes, long term urinary changes, narrowing/stenosis/obstruction of bladder and urethra, contracted bladder or scar formation of bladder, long-term pain with urination or with bowel movement, bowel injury causing abdominal cramping, loose stool, diarrhea, urgency to have a bowel movement, blood in the stool, bladder injury causing bladder irritation with frequent urination, bladder spasm, burning with urination, bloody urine, fistula formation, bowel obstruction, erectile function decline, nerve damage, lower extremity edema, swelling of peroneal, scrotal, and penile areas, damage to other normal tissues/organs, and secondary malignancies.    The patient felt that he understood the above discussion well, and that all questions were addressed thoroughly and to his satisfaction. The patient indicated that he decided to proceed with palliative radiation treatment with the goal to alleviate his pain symptom at this time.  He also consented to palliative radiation treatment.    Plan  He will be scheduled for CT based radiation planning on May 20, 2025 when he returns for cycle 2 of FOLFOX chemotherapy.  I plan to start palliative radiation treatment on May 27, 2025.  I will coordinate his palliative radiation treatment and FOLFOX chemotherapy, and will ask Dr. Poon to hold off on FOLFOX chemotherapy while he is receiving palliative radiation treatment and to resume FOLFOX chemotherapy approximately 4 weeks following completion of palliative radiotherapy.    Thank you very much for the opportunity to assist in the care of your patient. If I can be of further assistance, please do not hesitate to contact me.    This chart is completed using Dragon [employer] voice recognition. Because of the limitations of this technology, there may be some error in the transcription that are  unintended despite editing on my part. If you have any questions or concerns, please do not hesitate to contact me for clarification.    Total exam time spent on the day of the visit including review of the chart/medical records, procedure/operative notes, pathology reports, laboratory work, reviewing and interpreting pertinent imaging studies and notes, obtaining a detailed history and physical exam, education, discussion/counseling regarding the above diagnosis and the radiation oncology treatment plan with the patient and documenting in epic was 100 minutes.

## 2025-05-19 NOTE — PROGRESS NOTES
This writer went to talked to Radiation therapy today, patient was there in consult.  I asked the MD if in patient appeared overtly fatigued requiring immediate intervention.  Provider did not think in his assessment patient was in need of immediate intervention today.   Patient scheduled tomorrow for provider visit and chemo.  Per Dr. Escoto patient okay to get treated tomorrow, then chemo should be held for 4 full weeks after the completion of radiaiton.  This information was passed on to Saida Bragg np who is seeing tomorrow.

## 2025-05-20 ENCOUNTER — ALLIED HEALTH/NURSE VISIT (OUTPATIENT)
Dept: RADIATION ONCOLOGY | Facility: HOSPITAL | Age: 70
End: 2025-05-20
Attending: INTERNAL MEDICINE
Payer: COMMERCIAL

## 2025-05-20 ENCOUNTER — INFUSION THERAPY VISIT (OUTPATIENT)
Dept: INFUSION THERAPY | Facility: OTHER | Age: 70
End: 2025-05-20
Attending: INTERNAL MEDICINE
Payer: COMMERCIAL

## 2025-05-20 ENCOUNTER — OFFICE VISIT (OUTPATIENT)
Dept: SURGERY | Facility: OTHER | Age: 70
End: 2025-05-20
Attending: INTERNAL MEDICINE
Payer: COMMERCIAL

## 2025-05-20 ENCOUNTER — ONCOLOGY VISIT (OUTPATIENT)
Dept: ONCOLOGY | Facility: OTHER | Age: 70
End: 2025-05-20
Attending: INTERNAL MEDICINE
Payer: COMMERCIAL

## 2025-05-20 VITALS — RESPIRATION RATE: 18 BRPM | SYSTOLIC BLOOD PRESSURE: 123 MMHG | DIASTOLIC BLOOD PRESSURE: 53 MMHG

## 2025-05-20 VITALS
WEIGHT: 166.23 LBS | OXYGEN SATURATION: 97 % | BODY MASS INDEX: 25.19 KG/M2 | TEMPERATURE: 97 F | HEIGHT: 68 IN | HEART RATE: 87 BPM | SYSTOLIC BLOOD PRESSURE: 122 MMHG | DIASTOLIC BLOOD PRESSURE: 60 MMHG

## 2025-05-20 DIAGNOSIS — G62.0 CHEMOTHERAPY-INDUCED NEUROPATHY: ICD-10-CM

## 2025-05-20 DIAGNOSIS — B49 FUNGAL INFECTION: ICD-10-CM

## 2025-05-20 DIAGNOSIS — C20 RECTAL CANCER (H): Primary | ICD-10-CM

## 2025-05-20 DIAGNOSIS — C20 MALIGNANT NEOPLASM OF RECTUM (H): Primary | ICD-10-CM

## 2025-05-20 DIAGNOSIS — R26.9 IMPAIRED GAIT: ICD-10-CM

## 2025-05-20 DIAGNOSIS — T45.1X5A CHEMOTHERAPY-INDUCED NEUROPATHY: ICD-10-CM

## 2025-05-20 LAB
ALBUMIN SERPL BCG-MCNC: 3.9 G/DL (ref 3.5–5.2)
ALP SERPL-CCNC: 138 U/L (ref 40–150)
ALT SERPL W P-5'-P-CCNC: 18 U/L (ref 0–70)
ANION GAP SERPL CALCULATED.3IONS-SCNC: 13 MMOL/L (ref 7–15)
AST SERPL W P-5'-P-CCNC: 28 U/L (ref 0–45)
BASOPHILS # BLD AUTO: 0.1 10E3/UL (ref 0–0.2)
BASOPHILS NFR BLD AUTO: 1 %
BILIRUB SERPL-MCNC: 0.3 MG/DL
BUN SERPL-MCNC: 25.7 MG/DL (ref 8–23)
CALCIUM SERPL-MCNC: 9.6 MG/DL (ref 8.8–10.4)
CEA SERPL-MCNC: 128 NG/ML
CHLORIDE SERPL-SCNC: 98 MMOL/L (ref 98–107)
CREAT SERPL-MCNC: 1.38 MG/DL (ref 0.67–1.17)
EGFRCR SERPLBLD CKD-EPI 2021: 55 ML/MIN/1.73M2
EOSINOPHIL # BLD AUTO: 0.3 10E3/UL (ref 0–0.7)
EOSINOPHIL NFR BLD AUTO: 2 %
ERYTHROCYTE [DISTWIDTH] IN BLOOD BY AUTOMATED COUNT: 14.6 % (ref 10–15)
GLUCOSE SERPL-MCNC: 126 MG/DL (ref 70–99)
HCO3 SERPL-SCNC: 24 MMOL/L (ref 22–29)
HCT VFR BLD AUTO: 28.2 % (ref 40–53)
HGB BLD-MCNC: 9.4 G/DL (ref 13.3–17.7)
IMM GRANULOCYTES # BLD: 0.1 10E3/UL
IMM GRANULOCYTES NFR BLD: 0 %
LYMPHOCYTES # BLD AUTO: 1.7 10E3/UL (ref 0.8–5.3)
LYMPHOCYTES NFR BLD AUTO: 14 %
MCH RBC QN AUTO: 30.3 PG (ref 26.5–33)
MCHC RBC AUTO-ENTMCNC: 33.3 G/DL (ref 31.5–36.5)
MCV RBC AUTO: 91 FL (ref 78–100)
MONOCYTES # BLD AUTO: 0.8 10E3/UL (ref 0–1.3)
MONOCYTES NFR BLD AUTO: 7 %
NEUTROPHILS # BLD AUTO: 9 10E3/UL (ref 1.6–8.3)
NEUTROPHILS NFR BLD AUTO: 76 %
NRBC # BLD AUTO: 0 10E3/UL
NRBC BLD AUTO-RTO: 0 /100
PLATELET # BLD AUTO: 321 10E3/UL (ref 150–450)
POTASSIUM SERPL-SCNC: 4.7 MMOL/L (ref 3.4–5.3)
PROT SERPL-MCNC: 7.2 G/DL (ref 6.4–8.3)
RBC # BLD AUTO: 3.1 10E6/UL (ref 4.4–5.9)
SODIUM SERPL-SCNC: 135 MMOL/L (ref 135–145)
WBC # BLD AUTO: 11.9 10E3/UL (ref 4–11)

## 2025-05-20 PROCEDURE — 82378 CARCINOEMBRYONIC ANTIGEN: CPT | Mod: ZL

## 2025-05-20 PROCEDURE — 36415 COLL VENOUS BLD VENIPUNCTURE: CPT | Performed by: NURSE PRACTITIONER

## 2025-05-20 PROCEDURE — 250N000011 HC RX IP 250 OP 636: Mod: JZ | Performed by: NURSE PRACTITIONER

## 2025-05-20 PROCEDURE — A4222 INFUSION SUPPLIES WITH PUMP: HCPCS

## 2025-05-20 PROCEDURE — 258N000003 HC RX IP 258 OP 636: Performed by: NURSE PRACTITIONER

## 2025-05-20 PROCEDURE — 77334 RADIATION TREATMENT AID(S): CPT | Performed by: RADIOLOGY

## 2025-05-20 PROCEDURE — 85004 AUTOMATED DIFF WBC COUNT: CPT | Mod: ZL | Performed by: NURSE PRACTITIONER

## 2025-05-20 PROCEDURE — G0463 HOSPITAL OUTPT CLINIC VISIT: HCPCS | Mod: 25

## 2025-05-20 PROCEDURE — 77263 THER RADIOLOGY TX PLNG CPLX: CPT | Performed by: RADIOLOGY

## 2025-05-20 PROCEDURE — 250N000011 HC RX IP 250 OP 636: Mod: JZ | Performed by: INTERNAL MEDICINE

## 2025-05-20 PROCEDURE — 84155 ASSAY OF PROTEIN SERUM: CPT | Mod: ZL | Performed by: NURSE PRACTITIONER

## 2025-05-20 PROCEDURE — 77334 RADIATION TREATMENT AID(S): CPT | Mod: 26 | Performed by: RADIOLOGY

## 2025-05-20 RX ORDER — ALBUTEROL SULFATE 90 UG/1
1-2 INHALANT RESPIRATORY (INHALATION)
Status: CANCELLED
Start: 2025-05-21

## 2025-05-20 RX ORDER — ALBUTEROL SULFATE 0.83 MG/ML
2.5 SOLUTION RESPIRATORY (INHALATION)
Status: CANCELLED | OUTPATIENT
Start: 2025-05-21

## 2025-05-20 RX ORDER — FLUOROURACIL 50 MG/ML
400 INJECTION, SOLUTION INTRAVENOUS ONCE
Status: CANCELLED | OUTPATIENT
Start: 2025-05-21

## 2025-05-20 RX ORDER — HEPARIN SODIUM,PORCINE 10 UNIT/ML
5-20 VIAL (ML) INTRAVENOUS DAILY PRN
OUTPATIENT
Start: 2025-05-23

## 2025-05-20 RX ORDER — LORAZEPAM 2 MG/ML
0.5 INJECTION INTRAMUSCULAR EVERY 4 HOURS PRN
Status: CANCELLED | OUTPATIENT
Start: 2025-05-21

## 2025-05-20 RX ORDER — METHYLPREDNISOLONE SODIUM SUCCINATE 40 MG/ML
40 INJECTION INTRAMUSCULAR; INTRAVENOUS
Status: CANCELLED
Start: 2025-05-21

## 2025-05-20 RX ORDER — DIPHENHYDRAMINE HYDROCHLORIDE 50 MG/ML
50 INJECTION, SOLUTION INTRAMUSCULAR; INTRAVENOUS
Status: CANCELLED
Start: 2025-05-21

## 2025-05-20 RX ORDER — FLUOROURACIL 50 MG/ML
400 INJECTION, SOLUTION INTRAVENOUS ONCE
Status: COMPLETED | OUTPATIENT
Start: 2025-05-20 | End: 2025-05-20

## 2025-05-20 RX ORDER — EPINEPHRINE 1 MG/ML
0.3 INJECTION, SOLUTION, CONCENTRATE INTRAVENOUS EVERY 5 MIN PRN
Status: CANCELLED | OUTPATIENT
Start: 2025-05-21

## 2025-05-20 RX ORDER — MEPERIDINE HYDROCHLORIDE 25 MG/ML
25 INJECTION INTRAMUSCULAR; INTRAVENOUS; SUBCUTANEOUS
Status: CANCELLED | OUTPATIENT
Start: 2025-05-21

## 2025-05-20 RX ORDER — HEPARIN SODIUM (PORCINE) LOCK FLUSH IV SOLN 100 UNIT/ML 100 UNIT/ML
5 SOLUTION INTRAVENOUS
OUTPATIENT
Start: 2025-05-23

## 2025-05-20 RX ORDER — DIPHENHYDRAMINE HYDROCHLORIDE 50 MG/ML
25 INJECTION, SOLUTION INTRAMUSCULAR; INTRAVENOUS
Status: CANCELLED
Start: 2025-05-21

## 2025-05-20 RX ORDER — ONDANSETRON 2 MG/ML
8 INJECTION INTRAMUSCULAR; INTRAVENOUS ONCE
Status: CANCELLED | OUTPATIENT
Start: 2025-05-21

## 2025-05-20 RX ORDER — ONDANSETRON 2 MG/ML
8 INJECTION INTRAMUSCULAR; INTRAVENOUS ONCE
Status: COMPLETED | OUTPATIENT
Start: 2025-05-20 | End: 2025-05-20

## 2025-05-20 RX ORDER — POLYETHYLENE GLYCOL 3350 17 G/17G
1 POWDER, FOR SOLUTION ORAL DAILY
COMMUNITY

## 2025-05-20 RX ORDER — DEXAMETHASONE 4 MG/1
8 TABLET ORAL DAILY
Qty: 4 TABLET | Refills: 2 | Status: SHIPPED | OUTPATIENT
Start: 2025-05-20

## 2025-05-20 RX ORDER — HEPARIN SODIUM,PORCINE 10 UNIT/ML
5-20 VIAL (ML) INTRAVENOUS DAILY PRN
Status: CANCELLED | OUTPATIENT
Start: 2025-05-21

## 2025-05-20 RX ORDER — HEPARIN SODIUM (PORCINE) LOCK FLUSH IV SOLN 100 UNIT/ML 100 UNIT/ML
5 SOLUTION INTRAVENOUS
Status: CANCELLED | OUTPATIENT
Start: 2025-05-21

## 2025-05-20 RX ADMIN — OXALIPLATIN 130 MG: 5 INJECTION, SOLUTION INTRAVENOUS at 13:35

## 2025-05-20 RX ADMIN — FOSAPREPITANT: 150 INJECTION, POWDER, LYOPHILIZED, FOR SOLUTION INTRAVENOUS at 11:58

## 2025-05-20 RX ADMIN — ONDANSETRON 8 MG: 2 INJECTION INTRAMUSCULAR; INTRAVENOUS at 11:50

## 2025-05-20 RX ADMIN — DEXTROSE MONOHYDRATE 250 ML: 50 INJECTION, SOLUTION INTRAVENOUS at 11:50

## 2025-05-20 RX ADMIN — LEUCOVORIN CALCIUM 700 MG: 500 INJECTION, POWDER, LYOPHILIZED, FOR SOLUTION INTRAMUSCULAR; INTRAVENOUS at 13:32

## 2025-05-20 RX ADMIN — FLUOROURACIL 770 MG: 50 INJECTION, SOLUTION INTRAVENOUS at 15:41

## 2025-05-20 ASSESSMENT — PAIN SCALES - GENERAL: PAINLEVEL_OUTOF10: MILD PAIN (3)

## 2025-05-20 NOTE — PROGRESS NOTES
Oncology Follow-up Visit    Reason for Visit:  Ezra is a 69 year old man with a diagnosis of rectal cancer, who presents to the clinic today for routine follow-up.    Nursing Note and documentation reviewed: Yes    Interval History: Ezra notes that he is doing decently well. He denies any signs of infection. Notes that the masses around his rectum will bleeding at times. No nausea or vomiting. Appetite is good. Good output via stoma. He does have some chronic neuropathy, not necessarily noted with cold but tingling is often there. Gabapentin helps significantly. Energy is there but he tires easily. Trying to work on posture. He started to slump over prior to his stoma revision due to the weight of his past stoma. No pain but really trying to straighten his posture. He does note that the itching in his groin improved following oral diflucan. The area is still moist but was told not to use powders. Unsure what more can help with all the moisture from draining tumors.     Patient was simulated for radiation today. Will be undergoing 10 fractions and Dr. Escoto would like to postpone additional treatment until 4 weeks after last XRT to allow him to recover from toxicity. Planned XRT from 05/27-06/11.    Oncologic History:   Mr. Mcbride started having diarrhea in the end of March 2024.  He was evaluated by his primary care and recommended to undergo a colonoscopy and upper endoscopy.     4/29/2024: EGD:   3 cm hiatal hernia.  Normal stomach.  Normal third portion of the duodenum.  Nonobstructing Schatzki's ring.     4/29/2024: Sigmoidoscopy:   The digital rectal exam revealed a soft tissue mass palpated 8 cm from the anal verge.  The mass was noncircumferential and located predominantly at the posterior bowel wall.  An infiltrative partially obstructing large mass was found in the rectum.  The mass was circumferential.  The mass measured 4 cm in length.  No bleeding was present.  This was biopsied.  A pediatric colonoscope was  passed past the area but patient had a large quantity of stool present just above the stricture so colonoscopy was not able to be completed.     Biopsy of the rectal mass showed moderately to poorly differentiated adenocarcinoma.  ANDI     5/2/2024 CT chest with contrast:   No definite findings for metastatic disease in the chest. 2 small pulmonary nodules measuring up to 4 mm unchanged from prior study      5/2/2024: MRI pelvis with and without contrast:   A carcinoma involving the lower, middle and upper rectum extending over a length of roughly 12 cm demonstrated.  This lesion involves the mesorectum with no significant invasion anterolaterally on the right at the level of the mid rectum.  At this site infiltrative neoplasm is contactless with the posterior margin of the right seminal vesicle but it is not clearly invaded.  Neoplasm extends roughly 1.5 cm beyond the rectal margin.  Numerous enlarged mesorectal lymph nodes are demonstrated.  The larger mesorectal lymph node is demonstrated on the right measuring 1.8X 1.5 cm.  Additional mesorectal lymph nodes with short axis measurements at least 1 cm demonstrated.  Inferior mesenteric lymph nodes with short axis measurement of 1.1 cm X 1.0 cm are noted.  A left internal iliac node measuring 1.2X 0.8 cm is demonstrated.  A 2.7 X1.8 centimeter right iliac node is demonstrated.  The other visualized bowel is unremarkable.  The prostate is mildly enlarged.  No free fluid is demonstrated.     5/13/2024: CT abdomen and pelvis without contrast:   Large infiltrative rectal mass involving the upper middle and lower rectum.  There is surrounding inflammatory change and pelvic free fluid.  There is adjacent mesorectal adenopathy, a left internal iliac node as well.  Large amount of stool throughout the colon.  Enlarged prostate gland.  Left probable UPJ obstruction.     6/6/2024: Diverting colostomy     7/25/2024: PET scan:   Masslike thickening with associated FDG avidity  throughout the rectum is consistent with given history.  Metastatic disease within the liver, retroperitoneal nodes, perirectal nodes and inguinal nodes.  Findings are concerning for a UPJ obstruction.  Nuclear medicine renal Lasix scan for further characterize.     7/24/2024 to 12/23/2024:12 cycles of FOLFOX     12/18/2024: PET scan:  IMPRESSION: Favorable response to therapy. Interval resolution of hypermetabolism associated with a focal metastatic disease to the liver as was seen previously and interval resolution of hypermetabolism within the rectal lesion.     1/11/2025: Started  Capecitabine for maintenance     2/11/2025: Lower EUS:    There  is heavy ulceration with stricturing in the mid rectum      3/26/2025: PET scan:  Significant interval worsening of disease, now with diffuse metastatic disease of the thorax, abdomen and pelvis. Representative lesions as described above.         4/4/2025: Colostomy revision and exam under anesthesia:   Felt to have worsening disease at the rectum therefore AN excision of the rectal mass was not performed.  A colostomy revision was performed.     Residual adenocarcinoma is present in the form of two (2) mesenteric peritoneal deposits, 4mm and 2 mm, respectively  Definitive treatment effect is not seen  Focal mucosal ulceration and granulation tissue-type polyp  Mucosal hemangiomas  Twelve (12) lymph nodes, negative for metastatic carcinoma (0/12)           05/07/2025 Recommenced 5-FU with oxaliplatin again.  Does have some borderline neuropathy and therefore we will dose reduce the oxaliplatin to 65 mg/m .    05/27/2025-06/09/2025 Tentative course of palliative XRT      Current Chemo Regimen/TX: FOLFOX with dose reduced oxali to 65 mg/m2  Current Cycle: 2  # of completed cycles: 1  ** Plan on holding C3 for 4 weeks after his final XRT treatment      Past Medical History:   Diagnosis Date    Adolescent idiopathic scoliosis of thoracolumbar region 11/16/2017    Nidia  03/28/2013    Essential hypertension 03/05/2012    Formatting of this note might be different from the original.   Epic      Hyperlipidemia 05/12/2025    Malignant neoplasm of rectum (H) 03/13/2025    Metastasis from rectal cancer (H)     Pure hypercholesterolemia 10/04/2012    Rectal cancer (H) 05/29/2024    Scoliosis     Severe protein-calorie malnutrition 06/07/2024    Type 2 diabetes mellitus without complication (H) 06/21/2012       Past Surgical History:   Procedure Laterality Date    CATARACT EXTRACTION W/ INTRAOCULAR LENS IMPLANT Left 2021    ENDOSCOPY N/A 02/11/2025    upper and lower    GI SURGERY  04/04/2025    Colostomy revision    PHACOEMULSIFICATION WITH STANDARD INTRAOCULAR LENS IMPLANT Right 09/29/2021    Procedure: right Cataract Extraction with Implant;  Surgeon: Juan Prado MD;  Location: [facility]    REVISION COLOSTOMY N/A 04/07/2025    robotic colostomy N/A 06/06/2024       Family History   Problem Relation Age of Onset    Hypertension Mother     Pancreatic Cancer Father 55    Hypertension Sister     Other Problems  (knee replacement) Sister         bilateal knee replacement    Other Problems  (hip replacement) Sister         bilateral hip replacement    Diabetes No family hx of     Coronary Artery Disease No family hx of     Hyperlipidemia No family hx of     Cerebrovascular Disease No family hx of     Breast Cancer No family hx of     Colon Cancer No family hx of     Prostate Cancer No family hx of     Anesthesia Reaction No family hx of     Asthma No family hx of     Genetic Disorder No family hx of     Thyroid Disease No family hx of        Social History     Socioeconomic History    Marital status:      Spouse name: Gayle    Number of children: 0    Years of education: Not on file    Highest education level: Not on file   Occupational History    Occupation: worked at [employer]     Comment: on medical leave    Occupation:      Comment: retired   Tobacco Use     Smoking status: Former     Current packs/day: 0.00     Average packs/day: 0.5 packs/day for 15.0 years (7.5 ttl pk-yrs)     Types: Cigarettes     Start date:      Quit date:      Years since quittin.4     Passive exposure: Past    Smokeless tobacco: Former    Tobacco comments:     Chewed for about 15 years when he smoked.    Vaping Use    Vaping status: Never Used   Substance and Sexual Activity    Alcohol use: Not Currently    Drug use: Never    Sexual activity: Yes   Other Topics Concern    Parent/sibling w/ CABG, MI or angioplasty before 65F 55M? No   Social History Narrative    Not on file     Social Drivers of Health     Financial Resource Strain: Not on file   Food Insecurity: No Food Insecurity (2024)    [facility] [place] [facility] [employer] [facility]    Hunger Vital Sign     Worried About Running Out of Food in the Last Year: Never true     Ran Out of Food in the Last Year: Never true   Transportation Needs: No Transportation Needs (2024)    Received from [facility]    PRAPARE - Transportation     Lack of Transportation (Medical): No     Lack of Transportation (Non-Medical): No   Physical Activity: Not on file   Stress: Not on file   Social Connections: Not on file   Interpersonal Safety: Low Risk  (2025)    Interpersonal Safety     Do you feel physically and emotionally safe where you currently live?: Yes     Within the past 12 months, have you been hit, slapped, kicked or otherwise physically hurt by someone?: No     Within the past 12 months, have you been humiliated or emotionally abused in other ways by your partner or ex-partner?: No   Housing Stability: High Risk (2024)    Received from [facility]    Housing Stability Vital Sign     Unable to Pay for Housing in the Last Year: No     Number of Times Moved in the Last Year: 2     Homeless in the Last Year: No       Current Outpatient  Medications   Medication Sig Dispense Refill    acetaminophen (TYLENOL) 500 MG tablet Take 650 mg by mouth every 6 hours as needed for mild pain.      aspirin 81 MG EC tablet Take 81 mg by mouth daily      atorvastatin (LIPITOR) 20 MG tablet Take 20 mg by mouth daily      dexAMETHasone (DECADRON) 4 MG tablet Take 2 tablets (8 mg) by mouth daily. Take for 2 days, starting the day after chemo. Take with food. 4 tablet 2    Fluorouracil (ADRUCIL) 4,630 mg in sodium chloride 0.9 % 230 mL via CADD pump Infuse 4,630 mg at 5 mL/hr over 46 hours into the vein once for 1 dose. 326549 mL 0    gabapentin (NEURONTIN) 300 MG capsule Take 900 mg by mouth 3 times daily.      glipiZIDE (GLUCOTROL XL) 2.5 MG 24 hr tablet Take 2.5 mg by mouth daily. Outside prescriber      hydrochlorothiazide (HYDRODIURIL) 25 MG tablet Take 25 mg by mouth daily      lidocaine-prilocaine (EMLA) 2.5-2.5 % external cream Apply topically daily as needed for moderate pain 30 g 1    lisinopril (ZESTRIL) 40 MG tablet Take 40 mg by mouth daily      metFORMIN (GLUCOPHAGE-XR) 750 MG 24 hr tablet Take 500 mg by mouth 2 times daily (with meals).      multivitamin w/minerals (MULTI-VITAMIN) tablet Take 1 tablet by mouth daily      Oxymetazoline HCl (NASAL DECONGESTANT SPRAY NA) Spray in nostril as needed.      polyethylene glycol (MIRALAX) 17 GM/Dose powder Take 1 Capful by mouth daily.      Polyethylene Glycol 3350 (MIRALAX PO) Take 1 Capful by mouth daily      traZODone (DESYREL) 100 MG tablet Take 100 mg by mouth at bedtime.      loperamide (IMODIUM A-D) 2 MG tablet Take 1 tablet (2 mg) by mouth 4 times daily as needed for diarrhea (Patient not taking: Reported on 5/20/2025) 90 tablet 0    nitroGLYcerin (RECTIV) 0.4 % OINT rectal ointment Apply 1 inch topically every 12 hours. (Patient not taking: Reported on 4/29/2025)      ondansetron (ZOFRAN ODT) 4 MG ODT tab Take 4 mg by mouth every 8 hours as needed for nausea. (Patient not taking: Reported on 5/20/2025)   "    ondansetron (ZOFRAN) 8 MG tablet Take 1 tablet (8 mg) by mouth every 8 hours as needed for nausea (vomiting). (Patient not taking: Reported on 5/20/2025) 30 tablet 2    prochlorperazine (COMPAZINE) 10 MG tablet Take 1 tablet (10 mg) by mouth every 6 hours as needed for nausea or vomiting. (Patient not taking: Reported on 5/20/2025) 30 tablet 2     No current facility-administered medications for this visit.     Facility-Administered Medications Ordered in Other Visits   Medication Dose Route Frequency Provider Last Rate Last Admin    fluorouracil (ADRUCIL) 4,630 mg in sodium chloride 0.9 % 230 mL 46 hr HOME Infusion (via CADD)  2,400 mg/m2 (Treatment Plan Recorded) Intravenous Once Saida Bragg APRN CNP   4,630 mg at 05/20/25 1542    sodium chloride (PF) 0.9% PF flush 3-20 mL  3-20 mL Intracatheter q1 min prn Saida Bragg APRN CNP   10 mL at 05/20/25 1540        No Known Allergies    Review Of Systems:  A complete review of systems is negative except for the above mentioned items in the interval history.     ECOG Performance Status: 0    Physical Exam:  /60 (BP Location: Right arm, Patient Position: Sitting, Cuff Size: Adult Regular)   Pulse 87   Temp 97  F (36.1  C) (Tympanic)   Ht 1.727 m (5' 8\")   Wt 75.4 kg (166 lb 3.6 oz)   SpO2 97%   BMI 25.27 kg/m    GENERAL APPEARANCE: Healthy, alert and in no acute distress.  HEENT: Eyes appear normal without scleral icterus. Extraocular movements intact.   NECK:   Supple with normal range of motion. No asymmetry.  RESP: Lungs clear to auscultation bilaterally, respirations regular and easy.  CARDIOVASCULAR: Regular rate and rhythm. Normal S1, S2; no murmur, gallop, or rub.  ABDOMEN: Soft, non-tender, non-distended. Stoma pink and moist with good output.   MUSCULOSKELETAL: Extremities without gross deformities noted.   SKIN: No suspicious lesions or rashes.  NEURO: Alert and oriented x 3.  Gait steady.  PSYCHIATRIC: Mentation and affect " appear normal.  Mood appropriate.    Laboratory:  Results for orders placed or performed in visit on 05/20/25   Comprehensive metabolic panel     Status: Abnormal   Result Value Ref Range    Sodium 135 135 - 145 mmol/L    Potassium 4.7 3.4 - 5.3 mmol/L    Carbon Dioxide (CO2) 24 22 - 29 mmol/L    Anion Gap 13 7 - 15 mmol/L    Urea Nitrogen 25.7 (H) 8.0 - 23.0 mg/dL    Creatinine 1.38 (H) 0.67 - 1.17 mg/dL    GFR Estimate 55 (L) >60 mL/min/1.73m2    Calcium 9.6 8.8 - 10.4 mg/dL    Chloride 98 98 - 107 mmol/L    Glucose 126 (H) 70 - 99 mg/dL    Alkaline Phosphatase 138 40 - 150 U/L    AST 28 0 - 45 U/L    ALT 18 0 - 70 U/L    Protein Total 7.2 6.4 - 8.3 g/dL    Albumin 3.9 3.5 - 5.2 g/dL    Bilirubin Total 0.3 <=1.2 mg/dL   CBC with platelets and differential     Status: Abnormal   Result Value Ref Range    WBC Count 11.9 (H) 4.0 - 11.0 10e3/uL    RBC Count 3.10 (L) 4.40 - 5.90 10e6/uL    Hemoglobin 9.4 (L) 13.3 - 17.7 g/dL    Hematocrit 28.2 (L) 40.0 - 53.0 %    MCV 91 78 - 100 fL    MCH 30.3 26.5 - 33.0 pg    MCHC 33.3 31.5 - 36.5 g/dL    RDW 14.6 10.0 - 15.0 %    Platelet Count 321 150 - 450 10e3/uL    % Neutrophils 76 %    % Lymphocytes 14 %    % Monocytes 7 %    % Eosinophils 2 %    % Basophils 1 %    % Immature Granulocytes 0 %    NRBCs per 100 WBC 0 <1 /100    Absolute Neutrophils 9.0 (H) 1.6 - 8.3 10e3/uL    Absolute Lymphocytes 1.7 0.8 - 5.3 10e3/uL    Absolute Monocytes 0.8 0.0 - 1.3 10e3/uL    Absolute Eosinophils 0.3 0.0 - 0.7 10e3/uL    Absolute Basophils 0.1 0.0 - 0.2 10e3/uL    Absolute Immature Granulocytes 0.1 <=0.4 10e3/uL    Absolute NRBCs 0.0 10e3/uL   CBC with platelets differential     Status: Abnormal    Narrative    The following orders were created for panel order CBC with platelets differential.  Procedure                               Abnormality         Status                     ---------                               -----------         ------                     CBC with platelets and  ...[[id number]]  Abnormal            Final result                 Please view results for these tests on the individual orders.       Imaging Studies:    None this visit    ASSESSMENT/PLAN:    #1 Stage IV rectal cancer: K-pastora wild-type, pMMR     Initially presented with diarrhea in March 2024.  Sigmoidoscopy showed rectal mass infiltrative partially obstructing large mass 8 cm from the anal verge.  MRI pelvis showed carcinoma involving lower, middle and upper rectum extending over a length of roughly 12 cm.  Lesion involves the mesorectum but most significant invasion anterolaterally on the right at the level of the mid rectum.  Neoplasm is contiguous with the posterior margin of the right seminal vesicle but is not clearly invading it.  Extends roughly 1.5 cm beyond the rectal margin.  It also showed numerous enlarged mesorectal lymph nodes.  A inferior mesenteric lymph node was also noted.  There were also enlarged left iliac nodes and right iliac node.     He had already had a diverting ostomy by  on 6/6/2024.     He was then found to have metastatic disease on PET scan.   PET scan showed 5 foci of abnormal avidity present within the liver, involvement of the common iliac lymph node, upper abdominal para-aortic lymph node, right common iliac node and left perirectal node.  A few inguinal nodes are also avid.     He had completed 12 cycles of FOLFOX from 7/2024-12/2024.  PET scan 9/12/2024 showed very good response.  For his convenience as he was having difficulty with the 5-FU pump we made a decision to switch to oral capecitabine for maintenance.  He then started maintenance capecitabine Jan 2025     He started treatment on 1/11/2025 but was held when he went to ED on 2/3/2025. He is doing better now. Bleeding has improved.     He met with his colorectal surgeon, Dr. Cordova for discussion regarding APR with a permanent colostomy. He was taken to surgery on 4/4/2025.  Given the extent of the rectal  mass a APR could not be performed however he did have an ostomy revision.     He does have progression systemically on PET scan done 3/26/2025.  He met with Dr. Poon and ultimately recommenced FOLFOX but with oxali dose reduced given prior neuropathy. Today, he presents for C2. He is tolerating therapy with modest side effects. I see no contraindications to proceeding with therapy today. Following today, treatment will be held while he undergoes a course of palliative XRT over 2 weeks. We will allow for 4 weeks for recovery and see him back in early Jly for consideration of C3 therapy. Follow-up sooner with any concerns.      2.  Fungal infection involving scrotum:  Fluconazole helps with pruritus and rash. Area is very moist. Told not to use powders per XRT. Will refer to wound care to see if there is any particular dressing that may be beneficial.     3. Impaired gait/posture   Patient walks with a slump related to the weight of his prior stoma. Requesting cane to help force him to stay upright while walking. Declining referral to PT to work on core strength. DME provided.     4. Chemo induced neuropathy   Since prior Oxaliplatin. Will leave doses reduced at 65 mg/m2. Gabapentin 900 mg TID helping significantly with symptoms. Encouraged to avoid cold. Will monitor.       Patient in agreement with plan and verbalizes understanding. Agrees to call with any questions or concerns.    49 minutes spent in the patient's encounter today with time spent in review of patient's chart along with chart preparation and review of the treatment plan and signing of treatment plan.  Time was also spent with the patient in obtaining a review of systems and performing a physical exam along with detailed review of all test results. Time was also spent in discussing plan for future follow-up and relating instructions for follow-up and in placing future orders.    Saida Bragg, [facility]  Medical Oncology

## 2025-05-20 NOTE — PROGRESS NOTES
Infusion Nursing Note:  Tal Mcbride presents today for FOLFOX.    Patient seen by provider today: Yes: Saida Bragg NP ONC  Home medications, allergies, vitals, fall risk, pain and abuse screen done at [facility] ON[facility] appt earlier this date. All reviewed by this nurse prior to treating. Patient denies questions or concerns not already discussed with provider prior, wishes to proceed with treatment.    present during visit today: Not Applicable.    Note: Call from [facility] at approx 1115 asking if ability to take patient early, as they are hoping to get him in for wound care later today. Advised able, ok to send to infusion.     1124: Message to Saida Bragg NP ONC asking she sign plan.       1143: Call from [facility] alerting Paula Kelly is going to come see patient on infusion. Patient/spouse updated.       Treatment Conditions:  Lab Results   Component Value Date    HGB 9.4 (L) 05/20/2025    WBC 11.9 (H) 05/20/2025    ANEU 9.0 (H) 05/20/2025     05/20/2025        Lab Results   Component Value Date     05/20/2025    POTASSIUM 4.7 05/20/2025    CR 1.38 (H) 05/20/2025    RAUDEL 9.6 05/20/2025    BILITOTAL 0.3 05/20/2025    ALBUMIN 3.9 05/20/2025    ALT 18 05/20/2025    AST 28 05/20/2025       Results reviewed, labs MET treatment parameters, ok to proceed with treatment.

## 2025-05-20 NOTE — PROGRESS NOTES
Radiation Oncology CT Simulation Note    Diagnosis: Progressive symptomatic invasive moderately differentiated rectal adenocarcinoma, status post flexible sigmoidoscopy on April 29, 2024, robotic colostomy on June 6, 2024, 12 cycles of FOLFOX chemotherapy, completing on December 23, 2024, maintenance capecitabine starting on January 11, 2025, anal exam under anesthesia and colostomy revision for severe prolapse on April 4, 2025, starting FOLFOX chemotherapy on May 7, 2025     Radiation Region: Pelvis, perineal/perianal/intergluteal/scrotal/penile/bilateral inguinal geneva areas    The patient was brought to the CT simulation room to be set up for radiation treatment planning directed to the pelvic region.  I verified the patient's identity and treatment location with the therapists.     The patient was placed supine on the CT scanner couch to be positioned for radiation treatment planning.  His arms were placed on his chest with pelvis in a neutral position and in a frog-leg position.  He was instructed to have an empty rectum and full bladder which he confirmed that he achieved.  A Vac-aide was created for immobilization of the pelvis.  I placed markers to delineate the extent of his skin/subcutaneous soft tissue infiltrating rectal cancer.  Bolus with wet towels were placed over areas of infiltrating cutaneous/soft tissue rectal cancer over the intergluteal, perineal, perianal, and scrotal/penile areas.    The patient underwent a CT scan and CT images were taken through the pelvis/upper thigh region. The CT images were transferred to the PHARMAJET treatment planning system.  The patient tolerated the simulation well.     External beam radiation therapy planning will proceed.

## 2025-05-20 NOTE — PROGRESS NOTES
CLINIC NOTE  5/20/2025    Patient:Tal Mcbride    This is a 69 year old male seen at the request of   Saida Bragg NP for wound care.  The patient has a history of metastatic rectal cancer.  He is having pain and drainage from a left groin wound. He was previously treating this area with corn starch which was helping but was told by radiation oncology he can't use powders to the area any longer due to radiation therapy to the area.  His prior medical records were reviewed.        Current Medications:  Current Outpatient Medications   Medication Sig Dispense Refill    acetaminophen (TYLENOL) 500 MG tablet Take 650 mg by mouth every 6 hours as needed for mild pain.      aspirin 81 MG EC tablet Take 81 mg by mouth daily      atorvastatin (LIPITOR) 20 MG tablet Take 20 mg by mouth daily      dexAMETHasone (DECADRON) 4 MG tablet Take 2 tablets (8 mg) by mouth daily. Take for 2 days, starting the day after chemo. Take with food. 4 tablet 2    Fluorouracil (ADRUCIL) 4,630 mg in sodium chloride 0.9 % 230 mL via CADD pump Infuse 4,630 mg at 5 mL/hr over 46 hours into the vein once for 1 dose. 289551 mL 0    gabapentin (NEURONTIN) 300 MG capsule Take 900 mg by mouth 3 times daily.      glipiZIDE (GLUCOTROL XL) 2.5 MG 24 hr tablet Take 2.5 mg by mouth daily. Outside prescriber      hydrochlorothiazide (HYDRODIURIL) 25 MG tablet Take 25 mg by mouth daily      lidocaine-prilocaine (EMLA) 2.5-2.5 % external cream Apply topically daily as needed for moderate pain 30 g 1    lisinopril (ZESTRIL) 40 MG tablet Take 40 mg by mouth daily      loperamide (IMODIUM A-D) 2 MG tablet Take 1 tablet (2 mg) by mouth 4 times daily as needed for diarrhea (Patient not taking: Reported on 5/20/2025) 90 tablet 0    metFORMIN (GLUCOPHAGE-XR) 750 MG 24 hr tablet Take 500 mg by mouth 2 times daily (with meals).      multivitamin w/minerals (MULTI-VITAMIN) tablet Take 1 tablet by mouth daily      nitroGLYcerin (RECTIV) 0.4 % OINT rectal  ointment Apply 1 inch topically every 12 hours. (Patient not taking: Reported on 4/29/2025)      ondansetron (ZOFRAN ODT) 4 MG ODT tab Take 4 mg by mouth every 8 hours as needed for nausea. (Patient not taking: Reported on 5/20/2025)      ondansetron (ZOFRAN) 8 MG tablet Take 1 tablet (8 mg) by mouth every 8 hours as needed for nausea (vomiting). (Patient not taking: Reported on 5/20/2025) 30 tablet 2    Oxymetazoline HCl (NASAL DECONGESTANT SPRAY NA) Spray in nostril as needed.      polyethylene glycol (MIRALAX) 17 GM/Dose powder Take 1 Capful by mouth daily.      Polyethylene Glycol 3350 (MIRALAX PO) Take 1 Capful by mouth daily      prochlorperazine (COMPAZINE) 10 MG tablet Take 1 tablet (10 mg) by mouth every 6 hours as needed for nausea or vomiting. (Patient not taking: Reported on 5/20/2025) 30 tablet 2    traZODone (DESYREL) 100 MG tablet Take 100 mg by mouth at bedtime.         Allergies:  No Known Allergies    PHYSICAL EXAM:     Vital signs: There were no vitals taken for this visit.   BMI: There is no height or weight on file to calculate BMI.   General: cooperative, in no acute distress, alert   Skin: Swelling to penis, testicles, and groin consistent with metastatic cancer with moisture and drainage noted   Lungs: respirations are non-labored   Neurological: without deficit    ASSESSMENT:  69 year old male seen for wound care of left groin; metastatic rectal cancer     PLAN: Unfortunately patient is unable to use barrier ointments or creams to his left groin due to radiation therapy.  Patient instructed to keep area clean and dry with a washcloth or cotton cloth changed when moist.  Follow up as needed with problems/concerns.  Once radiation is complete to the area could re-explore other treatment options for the area.

## 2025-05-20 NOTE — NURSING NOTE
"Oncology Rooming Note    May 20, 2025 10:27 AM   Tal Mcbride is a 69 year old male who presents for:    Chief Complaint   Patient presents with    Oncology Clinic Visit     Follow up. Rectal cancer     Initial Vitals: /60 (BP Location: Right arm, Patient Position: Sitting, Cuff Size: Adult Regular)   Pulse 87   Temp 97  F (36.1  C) (Tympanic)   Ht 1.727 m (5' 8\")   Wt 75.4 kg (166 lb 3.6 oz)   SpO2 97%   BMI 25.27 kg/m   Estimated body mass index is 25.27 kg/m  as calculated from the following:    Height as of this encounter: 1.727 m (5' 8\").    Weight as of this encounter: 75.4 kg (166 lb 3.6 oz). Body surface area is 1.9 meters squared.  Mild Pain (3) Comment: Data Unavailable   No LMP for male patient.  Allergies reviewed: Yes  Medications reviewed: Yes    Medications: Medication refills not needed today.  Pharmacy name entered into [employer]:    [facility] - [place], [place] - [address] [facility] - [place], [place] - [address]    Frailty Screening:   Is the patient here for a new oncology consult visit in cancer care? 2. No    PHQ9:  Did this patient require a PHQ9?: No      Clinical concerns: discussed with Jo Whiteside LPN              "

## 2025-05-20 NOTE — PROGRESS NOTES
Patient was simulated today.    Wan BABIN.)  Department of Radiation Oncology  Phone: [phone]

## 2025-05-20 NOTE — PROGRESS NOTES
Post Infusion Assessment:  Patient tolerated infusion without incident.  Site patent and intact, free from redness, edema or discomfort.  No evidence of extravasations.   Fluorouacil home cadd pump hooked up. Pt will report to [facility] for pump off      Discharge Plan:   Patient discharged in stable condition accompanied by: wife.  Departure Mode: Ambulatory.

## 2025-05-22 ENCOUNTER — DOCUMENTATION ONLY (OUTPATIENT)
Dept: CARE COORDINATION | Facility: OTHER | Age: 70
End: 2025-05-22

## 2025-05-22 PROCEDURE — 77300 RADIATION THERAPY DOSE PLAN: CPT | Performed by: RADIOLOGY

## 2025-05-22 PROCEDURE — 77301 RADIOTHERAPY DOSE PLAN IMRT: CPT | Performed by: RADIOLOGY

## 2025-05-22 PROCEDURE — 77338 DESIGN MLC DEVICE FOR IMRT: CPT | Performed by: RADIOLOGY

## 2025-05-22 NOTE — PROGRESS NOTES
Received NCCN Distress Thermometer noting patient scored a 2/10.    The following concerns were noted:   Physical Concerns: Pain and Sleep  Emotional Concerns: N/A  Social Concerns: N/A  Practical Concerns: N/A  Spiritual or Catholic Concerns: N/A  Other Concerns: N/A    Met with patient? No.  has met w/pt previously.    Follow-up needed: No     Plan to follow-up with patient At a future visit    Addressed/will address the following: Health, Insurance, Finances, and Housing    Resources provided: Will provide as requested/needed    SW available as needed.

## 2025-05-27 ENCOUNTER — OFFICE VISIT (OUTPATIENT)
Dept: RADIATION ONCOLOGY | Facility: HOSPITAL | Age: 70
End: 2025-05-27
Payer: COMMERCIAL

## 2025-05-27 ENCOUNTER — RESULTS ONLY (OUTPATIENT)
Dept: RADIATION ONCOLOGY | Facility: HOSPITAL | Age: 70
End: 2025-05-27

## 2025-05-27 ENCOUNTER — APPOINTMENT (OUTPATIENT)
Dept: RADIATION ONCOLOGY | Facility: HOSPITAL | Age: 70
End: 2025-05-27
Attending: RADIOLOGY
Payer: COMMERCIAL

## 2025-05-27 VITALS — BODY MASS INDEX: 24.69 KG/M2 | WEIGHT: 162.4 LBS

## 2025-05-27 DIAGNOSIS — C20 RECTAL CANCER (H): Primary | ICD-10-CM

## 2025-05-27 LAB
RAD ONC ARIA COURSE ID: NORMAL
RAD ONC ARIA COURSE LAST TREATMENT DATE: NORMAL
RAD ONC ARIA COURSE START DATE: NORMAL
RAD ONC ARIA COURSE TREATMENT ELAPSED DAYS: 0
RAD ONC ARIA FIRST TREATMENT DATE: NORMAL
RAD ONC ARIA PLAN FRACTIONS TREATED TO DATE: 1
RAD ONC ARIA PLAN ID: NORMAL
RAD ONC ARIA PLAN PRESCRIBED DOSE PER FRACTION: 3 GY
RAD ONC ARIA PLAN TOTAL FRACTIONS PRESCRIBED: 10
RAD ONC ARIA PLAN TOTAL PRESCRIBED DOSE: 3000 CGY
RAD ONC ARIA REFERENCE POINT DOSAGE GIVEN TO DATE: NORMAL GY
RAD ONC ARIA REFERENCE POINT DOSAGE GIVEN TO DATE: NORMAL GY
RAD ONC ARIA REFERENCE POINT ID: NORMAL
RAD ONC ARIA REFERENCE POINT ID: NORMAL

## 2025-05-28 ENCOUNTER — APPOINTMENT (OUTPATIENT)
Dept: RADIATION ONCOLOGY | Facility: HOSPITAL | Age: 70
End: 2025-05-28
Payer: COMMERCIAL

## 2025-05-28 ENCOUNTER — RESULTS ONLY (OUTPATIENT)
Dept: RADIATION ONCOLOGY | Facility: HOSPITAL | Age: 70
End: 2025-05-28

## 2025-05-28 LAB
RAD ONC ARIA COURSE ID: NORMAL
RAD ONC ARIA COURSE LAST TREATMENT DATE: NORMAL
RAD ONC ARIA COURSE START DATE: NORMAL
RAD ONC ARIA COURSE TREATMENT ELAPSED DAYS: 1
RAD ONC ARIA FIRST TREATMENT DATE: NORMAL
RAD ONC ARIA PLAN FRACTIONS TREATED TO DATE: 2
RAD ONC ARIA PLAN ID: NORMAL
RAD ONC ARIA PLAN PRESCRIBED DOSE PER FRACTION: 3 GY
RAD ONC ARIA PLAN TOTAL FRACTIONS PRESCRIBED: 10
RAD ONC ARIA PLAN TOTAL PRESCRIBED DOSE: 3000 CGY
RAD ONC ARIA REFERENCE POINT DOSAGE GIVEN TO DATE: NORMAL GY
RAD ONC ARIA REFERENCE POINT DOSAGE GIVEN TO DATE: NORMAL GY
RAD ONC ARIA REFERENCE POINT ID: NORMAL
RAD ONC ARIA REFERENCE POINT ID: NORMAL

## 2025-05-28 PROCEDURE — 77014 PR CT GUIDE FOR PLACEMENT RADIATION THERAPY FIELDS: CPT | Mod: 26 | Performed by: RADIOLOGY

## 2025-05-28 PROCEDURE — 77386 HC IMRT TREATMENT DELIVERY, COMPLEX: CPT | Performed by: RADIOLOGY

## 2025-05-28 PROCEDURE — 77014 HC CT GUIDE FOR PLACEMENT RADIATION THERAPY FIELDS: CPT | Performed by: RADIOLOGY

## 2025-05-29 ENCOUNTER — RESULTS ONLY (OUTPATIENT)
Dept: RADIATION ONCOLOGY | Facility: HOSPITAL | Age: 70
End: 2025-05-29

## 2025-05-29 ENCOUNTER — OFFICE VISIT (OUTPATIENT)
Dept: RADIATION ONCOLOGY | Facility: HOSPITAL | Age: 70
End: 2025-05-29
Payer: COMMERCIAL

## 2025-05-29 DIAGNOSIS — G89.3 CANCER RELATED PAIN: Primary | ICD-10-CM

## 2025-05-29 LAB
RAD ONC ARIA COURSE ID: NORMAL
RAD ONC ARIA COURSE LAST TREATMENT DATE: NORMAL
RAD ONC ARIA COURSE START DATE: NORMAL
RAD ONC ARIA COURSE TREATMENT ELAPSED DAYS: 2
RAD ONC ARIA FIRST TREATMENT DATE: NORMAL
RAD ONC ARIA PLAN FRACTIONS TREATED TO DATE: 3
RAD ONC ARIA PLAN ID: NORMAL
RAD ONC ARIA PLAN PRESCRIBED DOSE PER FRACTION: 3 GY
RAD ONC ARIA PLAN TOTAL FRACTIONS PRESCRIBED: 10
RAD ONC ARIA PLAN TOTAL PRESCRIBED DOSE: 3000 CGY
RAD ONC ARIA REFERENCE POINT DOSAGE GIVEN TO DATE: NORMAL GY
RAD ONC ARIA REFERENCE POINT DOSAGE GIVEN TO DATE: NORMAL GY
RAD ONC ARIA REFERENCE POINT ID: NORMAL
RAD ONC ARIA REFERENCE POINT ID: NORMAL

## 2025-05-29 RX ORDER — FENTANYL 12.5 UG/1
1 PATCH TRANSDERMAL
Qty: 5 PATCH | Refills: 0 | Status: SHIPPED | OUTPATIENT
Start: 2025-05-29

## 2025-05-29 NOTE — PROGRESS NOTES
Progress Notes  Encounter Date: May 29, 2025  DANIEL Gonzales CNP     RADIATION ONCOLOGY WEEKLY MANAGEMENT PROGRESS NOTE     Patient Care Team         Relationship Specialty Notifications Start End    Juan Tariq MD PCP - General   9/21/21     Phone: [phone] Fax: [phone]         [facility] [address] [facility] [address]    Chris Coe DO    7/3/24     Phone: [phone] Fax: [phone]         [address] [address]    Paula Cordova MD    7/3/24     Phone: [phone] Fax: [phone]         [address] [facility] [address]    Alexandr Lim MD MD Gastroenterology  7/3/24     Phone: [phone] Fax: [phone]         [address] [facility] [address]    Jeni An, W    7/17/24     oncology    Phone: [phone]         Shawna Poon MD MD Hematology & Oncology  7/23/24     Phone: [phone] Fax: [phone]         [address] [place] [address]    Nano Donald, RN Specialty Care Coordinator Hematology & Oncology Admissions 7/23/24     Shawna Poon MD Home Infusion Following Provider Hematology & Oncology  4/30/25     Phone: [phone] Fax: [phone]         [address] [place] [address]    Jevon Oh MD Physician Radiation Oncology  5/12/25     Phone: [phone] Fax: [phone]         [address] [address]    Helen Galvez APRN CNP Nurse Practitioner Radiation Oncology  5/12/25     Phone: [phone] Fax: [phone]         [address] [place] [address]    Shawna Poon MD Assigned Cancer Care Provider   5/23/25     Phone: [phone] Fax: [phone]         [address] [place] [address]                    DIAGNOSIS:  Cancer Staging   Rectal cancer (H)  Staging form: Colon and Rectum, AJCC 8th Edition  - Clinical: cT3, cN2, cM1 - Signed by Shawna Poon MD on 4/29/2025          RADIATION THERAPY:    Tal Mcbride has received  "900 cGy to date to pelvis.   Treatment 3/10  Total planned dose: 3000 cGy      SUBJECTIVE:    Currently he is experiencing \"extreme\" pain noted as burning, often most bothersome at night. He also complains of pruritus in the perineal region. He explains the pain is interrupting sleep. He states he is unable to sit on his buttock, often either standing or sitting on his side due to the pain. He has been keeping the area clean and dry, and using a fan blowing on the tumor for relief when at home. He reports having tried tylenol, hydrocodone, oxycodone, and dilaudid without relief. He is requesting pain management and pruritus relief during this visit.       OBJECTIVE:    Examination reveals an alert, non ill appearing male patient, respirations non labored. Perineal region examined: Tumor appears to extend from the rectum to the buttock, scrotum, penis, and at minimal the left groin. The scrotum is firm and enlarged. There is an approximate 4-5 cm open area along the left groin appears to be weeping a small amount of clear fluid. Tumor region is malodorous.     IMPRESSION:  Routine tolerance to radiation therapy. Acute on chronic pain related to advanced rectal cancer during radiation therapy. Discussed pain may be related to tumor burden alone, and may be exacerbated by radiation related swelling of the tissue.       PLAN:  Continue treatment as planned.     Cancer related pain  -Discussed pain in depth with patient and family member. Patient reports having tried tylenol, hydrocodone, oxycodone and dilaudid without relief. He and his family member mention concern for nausea with narcotic medications. We will trial a low dose Fentanyl patch for cancer related pain. Will follow up with patient to see if this has been effective. Education provided regarding narcotic medications risks and side effects.     -Start Esteban Paste, apply 4 times daily as needed to the perineal region   -Educated patient not to apply cream " prior to radiation treatment. Patient reports he will shower prior to coming to treatment to assure cream is not applied.    -Discussed this is a triple mix that is intended to help with an antibacterial effect, swelling effect, and pain relief. Also may help with pruritus.   -Continue to keep the area clean and dry   -continue to follow with wound care    Patient and family member report understanding, deny further questions or concerns at this time and are agreeable to plan of care.     DANIEL Gonzales CNP

## 2025-05-30 ENCOUNTER — APPOINTMENT (OUTPATIENT)
Dept: RADIATION ONCOLOGY | Facility: HOSPITAL | Age: 70
End: 2025-05-30
Attending: RADIOLOGY
Payer: COMMERCIAL

## 2025-05-30 PROCEDURE — 77386 HC IMRT TREATMENT DELIVERY, COMPLEX: CPT | Performed by: RADIOLOGY

## 2025-05-30 PROCEDURE — 77014 PR CT GUIDE FOR PLACEMENT RADIATION THERAPY FIELDS: CPT | Mod: 26 | Performed by: RADIOLOGY

## 2025-05-30 PROCEDURE — 77014 HC CT GUIDE FOR PLACEMENT RADIATION THERAPY FIELDS: CPT | Performed by: RADIOLOGY

## 2025-06-03 ENCOUNTER — RESULTS ONLY (OUTPATIENT)
Dept: RADIATION ONCOLOGY | Facility: HOSPITAL | Age: 70
End: 2025-06-03

## 2025-06-03 ENCOUNTER — OFFICE VISIT (OUTPATIENT)
Dept: RADIATION ONCOLOGY | Facility: HOSPITAL | Age: 70
End: 2025-06-03
Attending: RADIOLOGY
Payer: COMMERCIAL

## 2025-06-03 VITALS
WEIGHT: 165.2 LBS | DIASTOLIC BLOOD PRESSURE: 68 MMHG | OXYGEN SATURATION: 95 % | BODY MASS INDEX: 25.12 KG/M2 | SYSTOLIC BLOOD PRESSURE: 112 MMHG | TEMPERATURE: 97.5 F | HEART RATE: 92 BPM | RESPIRATION RATE: 18 BRPM

## 2025-06-03 DIAGNOSIS — C20 RECTAL CANCER (H): Primary | ICD-10-CM

## 2025-06-03 LAB
RAD ONC ARIA COURSE ID: NORMAL
RAD ONC ARIA COURSE LAST TREATMENT DATE: NORMAL
RAD ONC ARIA COURSE START DATE: NORMAL
RAD ONC ARIA COURSE TREATMENT ELAPSED DAYS: 7
RAD ONC ARIA FIRST TREATMENT DATE: NORMAL
RAD ONC ARIA PLAN FRACTIONS TREATED TO DATE: 5
RAD ONC ARIA PLAN ID: NORMAL
RAD ONC ARIA PLAN PRESCRIBED DOSE PER FRACTION: 3 GY
RAD ONC ARIA PLAN TOTAL FRACTIONS PRESCRIBED: 10
RAD ONC ARIA PLAN TOTAL PRESCRIBED DOSE: 3000 CGY
RAD ONC ARIA REFERENCE POINT DOSAGE GIVEN TO DATE: NORMAL GY
RAD ONC ARIA REFERENCE POINT DOSAGE GIVEN TO DATE: NORMAL GY
RAD ONC ARIA REFERENCE POINT ID: NORMAL
RAD ONC ARIA REFERENCE POINT ID: NORMAL

## 2025-06-03 PROCEDURE — 3078F DIAST BP <80 MM HG: CPT | Performed by: RADIOLOGY

## 2025-06-03 PROCEDURE — 3074F SYST BP LT 130 MM HG: CPT | Performed by: RADIOLOGY

## 2025-06-03 PROCEDURE — 1125F AMNT PAIN NOTED PAIN PRSNT: CPT | Performed by: RADIOLOGY

## 2025-06-03 ASSESSMENT — PAIN SCALES - GENERAL: PAINLEVEL_OUTOF10: MODERATE PAIN (6)

## 2025-06-03 NOTE — PROGRESS NOTES
Progress Notes  Encounter Date: Ramirez 3, 2025  RADIATION ONCOLOGY WEEKLY MANAGEMENT PROGRESS NOTE     Patient Care Team         Relationship Specialty Notifications Start End    Juan Tariq MD PCP - General   9/21/21     Phone: [phone] Fax: [phone]         [facility] [address] [facility] [address]    Chris Coe DO    7/3/24     Phone: [phone] Fax: [phone]         [address] [address]    Paula Cordova MD    7/3/24     Phone: [phone] Fax: [phone]         [address] [facility] [address]    Alexandr Lim MD MD Gastroenterology  7/3/24     Phone: [phone] Fax: [phone]         [address] [facility] [address]    Jeni An, W    7/17/24     oncology    Phone: [phone]         Shawna Poon MD MD Hematology & Oncology  7/23/24     Phone: [phone] Fax: [phone]         [address] [place] [address]    Nano Donald, RN Specialty Care Coordinator Hematology & Oncology Admissions 7/23/24     Shawna Poon MD Home Infusion Following Provider Hematology & Oncology  4/30/25     Phone: [phone] Fax: [phone]         [address] [place] [address]    Jevon Oh MD Physician Radiation Oncology  5/12/25     Phone: [phone] Fax: [phone]         [address] [address]    Helen Galvez APRN CNP Nurse Practitioner Radiation Oncology  5/12/25     Phone: [phone] Fax: [phone]         [address] [place] [address]    Shawna Poon MD Assigned Cancer Care Provider   5/23/25     Phone: [phone] Fax: [phone]         [address] [place] [address]            DIAGNOSIS:  Cancer Staging   Rectal cancer (H)  Staging form: Colon and Rectum, AJCC 8th Edition  - Clinical: cT3, cN2, cM1 - Signed by Shawna Poon MD on 4/29/2025    RADIATION THERAPY:    Tal Mcbride has received 1500 cGy to date to the  pelvis,perineal/perianal/intergluteal/scrotal/penile/bilateral inguinal geneva areas .   Treatment 5/10  Total planned dose: 3000 cGy      SUBJECTIVE:    The patient presents for radiation oncology on treatment visit today.  He reports his pain is greatly improved with Esteban paste and at this time does not feel he needs to  the Fentanyl patches. He feels the Esteban Paste works well and is not taking any oral medications for pain control. He has been keeping the area clean and dry, and using a fan blowing on the tumor for relief when at home.    OBJECTIVE:    /68 (BP Location: Left arm, Patient Position: Chair, Cuff Size: Adult Regular)   Pulse 92   Temp 97.5  F (36.4  C) (Tympanic)   Resp 18   Wt 74.9 kg (165 lb 3.2 oz)   SpO2 95%   BMI 25.12 kg/m    He appears pleasant and in no acute distress.  Examination of the pelvic/perineal/intergluteal/scrotal/perianal area is remarkable for interval decrease in size of his infiltrating cancer.  Ostomy site is unremarkable.  Bowel sounds active.      IMPRESSION: There is no clinical evidence of radiation related adverse symptom on examination today.  He experiences interval improvement in pain symptom.      PLAN: He will continue palliative radiation treatment as planned.      Jevon Oh MD

## 2025-06-04 ENCOUNTER — APPOINTMENT (OUTPATIENT)
Dept: RADIATION ONCOLOGY | Facility: HOSPITAL | Age: 70
End: 2025-06-04
Payer: COMMERCIAL

## 2025-06-04 ENCOUNTER — RESULTS ONLY (OUTPATIENT)
Dept: RADIATION ONCOLOGY | Facility: HOSPITAL | Age: 70
End: 2025-06-04

## 2025-06-04 LAB
RAD ONC ARIA COURSE ID: NORMAL
RAD ONC ARIA COURSE LAST TREATMENT DATE: NORMAL
RAD ONC ARIA COURSE START DATE: NORMAL
RAD ONC ARIA COURSE TREATMENT ELAPSED DAYS: 8
RAD ONC ARIA FIRST TREATMENT DATE: NORMAL
RAD ONC ARIA PLAN FRACTIONS TREATED TO DATE: 6
RAD ONC ARIA PLAN ID: NORMAL
RAD ONC ARIA PLAN PRESCRIBED DOSE PER FRACTION: 3 GY
RAD ONC ARIA PLAN TOTAL FRACTIONS PRESCRIBED: 10
RAD ONC ARIA PLAN TOTAL PRESCRIBED DOSE: 3000 CGY
RAD ONC ARIA REFERENCE POINT DOSAGE GIVEN TO DATE: NORMAL GY
RAD ONC ARIA REFERENCE POINT DOSAGE GIVEN TO DATE: NORMAL GY
RAD ONC ARIA REFERENCE POINT ID: NORMAL
RAD ONC ARIA REFERENCE POINT ID: NORMAL

## 2025-06-04 PROCEDURE — 77386 HC IMRT TREATMENT DELIVERY, COMPLEX: CPT | Performed by: RADIOLOGY

## 2025-06-04 PROCEDURE — 77014 PR CT GUIDE FOR PLACEMENT RADIATION THERAPY FIELDS: CPT | Mod: 26 | Performed by: RADIOLOGY

## 2025-06-04 PROCEDURE — 77014 HC CT GUIDE FOR PLACEMENT RADIATION THERAPY FIELDS: CPT | Performed by: RADIOLOGY

## 2025-06-05 ENCOUNTER — APPOINTMENT (OUTPATIENT)
Dept: RADIATION ONCOLOGY | Facility: HOSPITAL | Age: 70
End: 2025-06-05
Payer: COMMERCIAL

## 2025-06-05 ENCOUNTER — CARE COORDINATION (OUTPATIENT)
Dept: CARE COORDINATION | Facility: OTHER | Age: 70
End: 2025-06-05

## 2025-06-05 ENCOUNTER — RESULTS ONLY (OUTPATIENT)
Dept: RADIATION ONCOLOGY | Facility: HOSPITAL | Age: 70
End: 2025-06-05

## 2025-06-05 LAB
RAD ONC ARIA COURSE ID: NORMAL
RAD ONC ARIA COURSE LAST TREATMENT DATE: NORMAL
RAD ONC ARIA COURSE START DATE: NORMAL
RAD ONC ARIA COURSE TREATMENT ELAPSED DAYS: 9
RAD ONC ARIA FIRST TREATMENT DATE: NORMAL
RAD ONC ARIA PLAN FRACTIONS TREATED TO DATE: 7
RAD ONC ARIA PLAN ID: NORMAL
RAD ONC ARIA PLAN PRESCRIBED DOSE PER FRACTION: 3 GY
RAD ONC ARIA PLAN TOTAL FRACTIONS PRESCRIBED: 10
RAD ONC ARIA PLAN TOTAL PRESCRIBED DOSE: 3000 CGY
RAD ONC ARIA REFERENCE POINT DOSAGE GIVEN TO DATE: NORMAL GY
RAD ONC ARIA REFERENCE POINT DOSAGE GIVEN TO DATE: NORMAL GY
RAD ONC ARIA REFERENCE POINT ID: NORMAL
RAD ONC ARIA REFERENCE POINT ID: NORMAL

## 2025-06-05 PROCEDURE — 77014 HC CT GUIDE FOR PLACEMENT RADIATION THERAPY FIELDS: CPT | Performed by: RADIOLOGY

## 2025-06-05 PROCEDURE — 77386 HC IMRT TREATMENT DELIVERY, COMPLEX: CPT | Performed by: RADIOLOGY

## 2025-06-05 PROCEDURE — 77014 PR CT GUIDE FOR PLACEMENT RADIATION THERAPY FIELDS: CPT | Mod: 26 | Performed by: RADIOLOGY

## 2025-06-05 NOTE — PROGRESS NOTES
Care Coordination Focused Note:    Met w/pt and writer assisted him with filling out a Health Care Directive. Writer notarized the document and sent a copy via interdepartmental mail to HIS dept.    Pt provided tax documents for his [facility] application. Writer emailed the application to Lawanda NEWELL in Patient Financial dept.

## 2025-06-06 ENCOUNTER — APPOINTMENT (OUTPATIENT)
Dept: RADIATION ONCOLOGY | Facility: HOSPITAL | Age: 70
End: 2025-06-06
Attending: RADIOLOGY
Payer: COMMERCIAL

## 2025-06-06 PROCEDURE — 77014 PR CT GUIDE FOR PLACEMENT RADIATION THERAPY FIELDS: CPT | Mod: 26 | Performed by: RADIOLOGY

## 2025-06-06 PROCEDURE — 77386 HC IMRT TREATMENT DELIVERY, COMPLEX: CPT | Performed by: STUDENT IN AN ORGANIZED HEALTH CARE EDUCATION/TRAINING PROGRAM

## 2025-06-06 PROCEDURE — 77014 HC CT GUIDE FOR PLACEMENT RADIATION THERAPY FIELDS: CPT | Performed by: STUDENT IN AN ORGANIZED HEALTH CARE EDUCATION/TRAINING PROGRAM

## 2025-06-09 ENCOUNTER — RESULTS ONLY (OUTPATIENT)
Dept: RADIATION ONCOLOGY | Facility: HOSPITAL | Age: 70
End: 2025-06-09

## 2025-06-09 ENCOUNTER — APPOINTMENT (OUTPATIENT)
Dept: RADIATION ONCOLOGY | Facility: HOSPITAL | Age: 70
End: 2025-06-09
Payer: COMMERCIAL

## 2025-06-09 LAB
RAD ONC ARIA COURSE ID: NORMAL
RAD ONC ARIA COURSE LAST TREATMENT DATE: NORMAL
RAD ONC ARIA COURSE START DATE: NORMAL
RAD ONC ARIA COURSE TREATMENT ELAPSED DAYS: 13
RAD ONC ARIA FIRST TREATMENT DATE: NORMAL
RAD ONC ARIA PLAN FRACTIONS TREATED TO DATE: 9
RAD ONC ARIA PLAN ID: NORMAL
RAD ONC ARIA PLAN PRESCRIBED DOSE PER FRACTION: 3 GY
RAD ONC ARIA PLAN TOTAL FRACTIONS PRESCRIBED: 10
RAD ONC ARIA PLAN TOTAL PRESCRIBED DOSE: 3000 CGY
RAD ONC ARIA REFERENCE POINT DOSAGE GIVEN TO DATE: NORMAL GY
RAD ONC ARIA REFERENCE POINT DOSAGE GIVEN TO DATE: NORMAL GY
RAD ONC ARIA REFERENCE POINT ID: NORMAL
RAD ONC ARIA REFERENCE POINT ID: NORMAL

## 2025-06-09 PROCEDURE — 77014 PR CT GUIDE FOR PLACEMENT RADIATION THERAPY FIELDS: CPT | Mod: 26 | Performed by: STUDENT IN AN ORGANIZED HEALTH CARE EDUCATION/TRAINING PROGRAM

## 2025-06-09 PROCEDURE — 77386 HC IMRT TREATMENT DELIVERY, COMPLEX: CPT | Performed by: STUDENT IN AN ORGANIZED HEALTH CARE EDUCATION/TRAINING PROGRAM

## 2025-06-09 PROCEDURE — 77014 HC CT GUIDE FOR PLACEMENT RADIATION THERAPY FIELDS: CPT | Performed by: STUDENT IN AN ORGANIZED HEALTH CARE EDUCATION/TRAINING PROGRAM

## 2025-06-10 ENCOUNTER — QUESTIONNAIRE SERIES SUBMISSION (OUTPATIENT)
Dept: INFUSION THERAPY | Facility: OTHER | Age: 70
End: 2025-06-10

## 2025-06-10 ENCOUNTER — RESULTS ONLY (OUTPATIENT)
Dept: RADIATION ONCOLOGY | Facility: HOSPITAL | Age: 70
End: 2025-06-10

## 2025-06-10 ENCOUNTER — OFFICE VISIT (OUTPATIENT)
Dept: RADIATION ONCOLOGY | Facility: HOSPITAL | Age: 70
End: 2025-06-10
Attending: RADIOLOGY
Payer: COMMERCIAL

## 2025-06-10 VITALS
HEART RATE: 80 BPM | RESPIRATION RATE: 20 BRPM | WEIGHT: 169.9 LBS | DIASTOLIC BLOOD PRESSURE: 58 MMHG | TEMPERATURE: 97.7 F | SYSTOLIC BLOOD PRESSURE: 98 MMHG | BODY MASS INDEX: 25.83 KG/M2 | OXYGEN SATURATION: 96 %

## 2025-06-10 DIAGNOSIS — C20 MALIGNANT NEOPLASM OF RECTUM (H): Primary | ICD-10-CM

## 2025-06-10 LAB
RAD ONC ARIA COURSE ID: NORMAL
RAD ONC ARIA COURSE LAST TREATMENT DATE: NORMAL
RAD ONC ARIA COURSE START DATE: NORMAL
RAD ONC ARIA COURSE TREATMENT ELAPSED DAYS: 14
RAD ONC ARIA FIRST TREATMENT DATE: NORMAL
RAD ONC ARIA PLAN FRACTIONS TREATED TO DATE: 10
RAD ONC ARIA PLAN ID: NORMAL
RAD ONC ARIA PLAN PRESCRIBED DOSE PER FRACTION: 3 GY
RAD ONC ARIA PLAN TOTAL FRACTIONS PRESCRIBED: 10
RAD ONC ARIA PLAN TOTAL PRESCRIBED DOSE: 3000 CGY
RAD ONC ARIA REFERENCE POINT DOSAGE GIVEN TO DATE: NORMAL GY
RAD ONC ARIA REFERENCE POINT DOSAGE GIVEN TO DATE: NORMAL GY
RAD ONC ARIA REFERENCE POINT ID: NORMAL
RAD ONC ARIA REFERENCE POINT ID: NORMAL

## 2025-06-10 PROCEDURE — 3078F DIAST BP <80 MM HG: CPT | Performed by: STUDENT IN AN ORGANIZED HEALTH CARE EDUCATION/TRAINING PROGRAM

## 2025-06-10 PROCEDURE — 1125F AMNT PAIN NOTED PAIN PRSNT: CPT | Performed by: STUDENT IN AN ORGANIZED HEALTH CARE EDUCATION/TRAINING PROGRAM

## 2025-06-10 PROCEDURE — 3074F SYST BP LT 130 MM HG: CPT | Performed by: STUDENT IN AN ORGANIZED HEALTH CARE EDUCATION/TRAINING PROGRAM

## 2025-06-10 ASSESSMENT — PAIN SCALES - GENERAL: PAINLEVEL_OUTOF10: MODERATE PAIN (5)

## 2025-06-10 NOTE — PROGRESS NOTES
Progress Notes  Encounter Date: Ramirez 10, 2025  RADIATION ONCOLOGY WEEKLY MANAGEMENT PROGRESS NOTE     Patient Care Team         Relationship Specialty Notifications Start End    Juan Tariq MD PCP - General   9/21/21     Phone: [phone] Fax: [phone]         [facility] [address] [facility] [address]    Chris Coe DO    7/3/24     Phone: [phone] Fax: [phone]         [address] [address]    Paula Cordova MD    7/3/24     Phone: [phone] Fax: [phone]         [address] [facility] [address]    Alexandr Lim MD MD Gastroenterology  7/3/24     Phone: [phone] Fax: [phone]         [address] [facility] [address]    Jeni An, W    7/17/24     oncology    Phone: [phone]         Shawna Poon MD MD Hematology & Oncology  7/23/24     Phone: [phone] Fax: [phone]         [address] [place] [address]    Nano Donald, RN Specialty Care Coordinator Hematology & Oncology Admissions 7/23/24     Shawna Poon MD Home Infusion Following Provider Hematology & Oncology  4/30/25     Phone: [phone] Fax: [phone]         [address] [place] [address]    Jveon Oh MD Physician Radiation Oncology  5/12/25     Phone: [phone] Fax: [phone]         [address] [address]    Helen Galvez APRN CNP Nurse Practitioner Radiation Oncology  5/12/25     Phone: [phone] Fax: [phone]         [address] [place] [address]    Shawna Poon MD Assigned Cancer Care Provider   5/23/25     Phone: [phone] Fax: [phone]         [address] [place] [address]            DIAGNOSIS:  Cancer Staging   Rectal cancer (H)  Staging form: Colon and Rectum, AJCC 8th Edition  - Clinical: cT3, cN2, cM1 - Signed by Shawna Poon MD on 4/29/2025    RADIATION THERAPY:    Tal Mcbride has received 3000 cGy to date to the  pelvis,perineal/perianal/intergluteal/scrotal/penile/bilateral inguinal geneva areas .   Treatment 10/10  Total planned dose: 3000 cGy      SUBJECTIVE:    The patient presents for radiation oncology on treatment visit today.  He reports his pain continue to be greatly improved with Esteban paste and at this time does not feel he needs to  the Fentanyl patches. He feels the Esteban Paste works well and is not taking any oral medications for pain control. He continues to keep the area clean and dry as best he can, and uses a fan blowing on the tumor for relief when at home.    OBJECTIVE:    BP 98/58 (BP Location: Left arm, Patient Position: Standing, Cuff Size: Adult Regular)   Pulse 80   Temp 97.7  F (36.5  C) (Tympanic)   Resp 20   Wt 77.1 kg (169 lb 14.4 oz)   SpO2 96%   BMI 25.83 kg/m    He appears pleasant and in no acute distress.  Examination of the pelvic/perineal/intergluteal/scrotal/perianal area is remarkable for interval decrease in size of his infiltrating cancer. There I patchy erythema and scattered small areas of moist desquamation without signs of infection. Ostomy site is unremarkable.        IMPRESSION: There is no clinical evidence of radiation related adverse symptom on examination today.  He experiences interval improvement in pain symptom.      PLAN: Completes palliative radiation treatment as planned today.          Chago Carrillo MD

## 2025-06-10 NOTE — PROGRESS NOTES
Patient actively not getting chemo therapy as chemo therapy is on hold until 4 week from last dose of Radiation.  Symptoms likely related to radiation therapy.  This writer noted patient has appointment for OTV with Dr. Carrillo today with radiation therapy.  This writer addressed concerns with radiation therapy/care team and they will discuss this with patient today during OTV.  Will continue to be a supportive resource for patient while he continues with his radiation therapy.  Patient is planned to return to Medical oncology to resume treatment on July 8th with a follow up with Saida Bragg NP.

## 2025-06-19 ENCOUNTER — DOCUMENTATION ONLY (OUTPATIENT)
Dept: OTHER | Facility: CLINIC | Age: 70
End: 2025-06-19

## 2025-06-29 ENCOUNTER — HEALTH MAINTENANCE LETTER (OUTPATIENT)
Age: 70
End: 2025-06-29

## 2025-06-30 ENCOUNTER — HOME INFUSION (OUTPATIENT)
Dept: HOME HEALTH SERVICES | Facility: HOME HEALTH | Age: 70
End: 2025-06-30
Payer: COMMERCIAL

## 2025-06-30 DIAGNOSIS — C20 RECTAL CANCER (H): ICD-10-CM

## 2025-07-07 ENCOUNTER — HOME INFUSION BILLING (OUTPATIENT)
Dept: HOME HEALTH SERVICES | Facility: HOME HEALTH | Age: 70
End: 2025-07-07
Payer: COMMERCIAL

## 2025-07-07 PROCEDURE — E1399 DURABLE MEDICAL EQUIPMENT MI: HCPCS

## 2025-07-08 ENCOUNTER — ONCOLOGY VISIT (OUTPATIENT)
Dept: ONCOLOGY | Facility: OTHER | Age: 70
End: 2025-07-08
Attending: NURSE PRACTITIONER
Payer: COMMERCIAL

## 2025-07-08 ENCOUNTER — INFUSION THERAPY VISIT (OUTPATIENT)
Dept: INFUSION THERAPY | Facility: OTHER | Age: 70
End: 2025-07-08
Attending: NURSE PRACTITIONER
Payer: COMMERCIAL

## 2025-07-08 VITALS
SYSTOLIC BLOOD PRESSURE: 104 MMHG | HEIGHT: 69 IN | HEART RATE: 84 BPM | BODY MASS INDEX: 25.96 KG/M2 | WEIGHT: 175.27 LBS | DIASTOLIC BLOOD PRESSURE: 62 MMHG | TEMPERATURE: 98.7 F | OXYGEN SATURATION: 98 %

## 2025-07-08 VITALS — HEART RATE: 82 BPM | DIASTOLIC BLOOD PRESSURE: 58 MMHG | SYSTOLIC BLOOD PRESSURE: 115 MMHG

## 2025-07-08 DIAGNOSIS — T45.1X5A CHEMOTHERAPY-INDUCED FATIGUE: ICD-10-CM

## 2025-07-08 DIAGNOSIS — C20 RECTAL CANCER (H): Primary | ICD-10-CM

## 2025-07-08 DIAGNOSIS — R53.83 CHEMOTHERAPY-INDUCED FATIGUE: ICD-10-CM

## 2025-07-08 DIAGNOSIS — T45.1X5A CHEMOTHERAPY-INDUCED NEUROPATHY: ICD-10-CM

## 2025-07-08 DIAGNOSIS — G62.0 CHEMOTHERAPY-INDUCED NEUROPATHY: ICD-10-CM

## 2025-07-08 DIAGNOSIS — R05.2 SUBACUTE COUGH: ICD-10-CM

## 2025-07-08 LAB
ALBUMIN SERPL BCG-MCNC: 3.4 G/DL (ref 3.5–5.2)
ALP SERPL-CCNC: 135 U/L (ref 40–150)
ALT SERPL W P-5'-P-CCNC: 13 U/L (ref 0–70)
ANION GAP SERPL CALCULATED.3IONS-SCNC: 13 MMOL/L (ref 7–15)
AST SERPL W P-5'-P-CCNC: 39 U/L (ref 0–45)
BASOPHILS # BLD AUTO: 0.1 10E3/UL (ref 0–0.2)
BASOPHILS NFR BLD AUTO: 1 %
BILIRUB SERPL-MCNC: 0.3 MG/DL
BUN SERPL-MCNC: 25.4 MG/DL (ref 8–23)
CALCIUM SERPL-MCNC: 9.4 MG/DL (ref 8.8–10.4)
CEA SERPL-MCNC: 80.7 NG/ML
CHLORIDE SERPL-SCNC: 98 MMOL/L (ref 98–107)
CREAT SERPL-MCNC: 1.39 MG/DL (ref 0.67–1.17)
EGFRCR SERPLBLD CKD-EPI 2021: 55 ML/MIN/1.73M2
EOSINOPHIL # BLD AUTO: 0.4 10E3/UL (ref 0–0.7)
EOSINOPHIL NFR BLD AUTO: 4 %
ERYTHROCYTE [DISTWIDTH] IN BLOOD BY AUTOMATED COUNT: 15.1 % (ref 10–15)
GLUCOSE SERPL-MCNC: 179 MG/DL (ref 70–99)
HCO3 SERPL-SCNC: 23 MMOL/L (ref 22–29)
HCT VFR BLD AUTO: 29.2 % (ref 40–53)
HGB BLD-MCNC: 9.5 G/DL (ref 13.3–17.7)
IMM GRANULOCYTES # BLD: 0.1 10E3/UL
IMM GRANULOCYTES NFR BLD: 1 %
LYMPHOCYTES # BLD AUTO: 0.7 10E3/UL (ref 0.8–5.3)
LYMPHOCYTES NFR BLD AUTO: 7 %
MCH RBC QN AUTO: 29.4 PG (ref 26.5–33)
MCHC RBC AUTO-ENTMCNC: 32.5 G/DL (ref 31.5–36.5)
MCV RBC AUTO: 90 FL (ref 78–100)
MONOCYTES # BLD AUTO: 0.9 10E3/UL (ref 0–1.3)
MONOCYTES NFR BLD AUTO: 8 %
NEUTROPHILS # BLD AUTO: 8.3 10E3/UL (ref 1.6–8.3)
NEUTROPHILS NFR BLD AUTO: 79 %
NRBC # BLD AUTO: 0 10E3/UL
NRBC BLD AUTO-RTO: 0 /100
PLATELET # BLD AUTO: 317 10E3/UL (ref 150–450)
POTASSIUM SERPL-SCNC: 4.6 MMOL/L (ref 3.4–5.3)
PROT SERPL-MCNC: 6.8 G/DL (ref 6.4–8.3)
RBC # BLD AUTO: 3.23 10E6/UL (ref 4.4–5.9)
SODIUM SERPL-SCNC: 134 MMOL/L (ref 135–145)
WBC # BLD AUTO: 10.5 10E3/UL (ref 4–11)

## 2025-07-08 PROCEDURE — 36415 COLL VENOUS BLD VENIPUNCTURE: CPT | Performed by: NURSE PRACTITIONER

## 2025-07-08 PROCEDURE — A4222 INFUSION SUPPLIES WITH PUMP: HCPCS

## 2025-07-08 PROCEDURE — 250N000011 HC RX IP 250 OP 636: Mod: JZ | Performed by: NURSE PRACTITIONER

## 2025-07-08 PROCEDURE — 258N000003 HC RX IP 258 OP 636: Performed by: NURSE PRACTITIONER

## 2025-07-08 PROCEDURE — 82247 BILIRUBIN TOTAL: CPT | Mod: ZL | Performed by: NURSE PRACTITIONER

## 2025-07-08 PROCEDURE — G0463 HOSPITAL OUTPT CLINIC VISIT: HCPCS

## 2025-07-08 PROCEDURE — 82378 CARCINOEMBRYONIC ANTIGEN: CPT | Mod: ZL

## 2025-07-08 PROCEDURE — 96523 IRRIG DRUG DELIVERY DEVICE: CPT

## 2025-07-08 PROCEDURE — 85014 HEMATOCRIT: CPT | Mod: ZL | Performed by: NURSE PRACTITIONER

## 2025-07-08 RX ORDER — DIPHENHYDRAMINE HYDROCHLORIDE 50 MG/ML
50 INJECTION, SOLUTION INTRAMUSCULAR; INTRAVENOUS
Status: CANCELLED
Start: 2025-07-08

## 2025-07-08 RX ORDER — DEXAMETHASONE 4 MG/1
8 TABLET ORAL DAILY
Qty: 4 TABLET | Refills: 2 | Status: SHIPPED | OUTPATIENT
Start: 2025-07-08

## 2025-07-08 RX ORDER — MEPERIDINE HYDROCHLORIDE 25 MG/ML
25 INJECTION INTRAMUSCULAR; INTRAVENOUS; SUBCUTANEOUS
Status: CANCELLED | OUTPATIENT
Start: 2025-07-08

## 2025-07-08 RX ORDER — EPINEPHRINE 1 MG/ML
0.3 INJECTION, SOLUTION, CONCENTRATE INTRAVENOUS EVERY 5 MIN PRN
Status: CANCELLED | OUTPATIENT
Start: 2025-07-08

## 2025-07-08 RX ORDER — FLUOROURACIL 50 MG/ML
400 INJECTION, SOLUTION INTRAVENOUS ONCE
Status: COMPLETED | OUTPATIENT
Start: 2025-07-08 | End: 2025-07-08

## 2025-07-08 RX ORDER — HEPARIN SODIUM (PORCINE) LOCK FLUSH IV SOLN 100 UNIT/ML 100 UNIT/ML
5 SOLUTION INTRAVENOUS
Status: CANCELLED | OUTPATIENT
Start: 2025-07-08

## 2025-07-08 RX ORDER — IRON,CARBONYL/ASCORBIC ACID 65MG-125MG
1 TABLET ORAL DAILY
COMMUNITY

## 2025-07-08 RX ORDER — FLUOROURACIL 50 MG/ML
400 INJECTION, SOLUTION INTRAVENOUS ONCE
Status: CANCELLED | OUTPATIENT
Start: 2025-07-08

## 2025-07-08 RX ORDER — LANOLIN ALCOHOL/MO/W.PET/CERES
1000 CREAM (GRAM) TOPICAL DAILY
COMMUNITY

## 2025-07-08 RX ORDER — DIPHENHYDRAMINE HYDROCHLORIDE 50 MG/ML
25 INJECTION, SOLUTION INTRAMUSCULAR; INTRAVENOUS
Status: CANCELLED
Start: 2025-07-08

## 2025-07-08 RX ORDER — ALBUTEROL SULFATE 90 UG/1
1-2 INHALANT RESPIRATORY (INHALATION)
Status: CANCELLED
Start: 2025-07-08

## 2025-07-08 RX ORDER — HEPARIN SODIUM (PORCINE) LOCK FLUSH IV SOLN 100 UNIT/ML 100 UNIT/ML
5 SOLUTION INTRAVENOUS
OUTPATIENT
Start: 2025-07-09

## 2025-07-08 RX ORDER — LORAZEPAM 2 MG/ML
0.5 INJECTION INTRAMUSCULAR EVERY 4 HOURS PRN
Status: CANCELLED | OUTPATIENT
Start: 2025-07-08

## 2025-07-08 RX ORDER — LEVOFLOXACIN 750 MG/1
750 TABLET, FILM COATED ORAL DAILY
Qty: 7 TABLET | Refills: 0 | Status: SHIPPED | OUTPATIENT
Start: 2025-07-08

## 2025-07-08 RX ORDER — ALBUTEROL SULFATE 0.83 MG/ML
2.5 SOLUTION RESPIRATORY (INHALATION)
Status: CANCELLED | OUTPATIENT
Start: 2025-07-08

## 2025-07-08 RX ORDER — HEPARIN SODIUM,PORCINE 10 UNIT/ML
5-20 VIAL (ML) INTRAVENOUS DAILY PRN
OUTPATIENT
Start: 2025-07-09

## 2025-07-08 RX ORDER — ONDANSETRON 2 MG/ML
8 INJECTION INTRAMUSCULAR; INTRAVENOUS ONCE
Status: COMPLETED | OUTPATIENT
Start: 2025-07-08 | End: 2025-07-08

## 2025-07-08 RX ORDER — ONDANSETRON 2 MG/ML
8 INJECTION INTRAMUSCULAR; INTRAVENOUS ONCE
Status: CANCELLED | OUTPATIENT
Start: 2025-07-08

## 2025-07-08 RX ORDER — METHYLPREDNISOLONE SODIUM SUCCINATE 40 MG/ML
40 INJECTION INTRAMUSCULAR; INTRAVENOUS
Status: CANCELLED
Start: 2025-07-08

## 2025-07-08 RX ORDER — HEPARIN SODIUM,PORCINE 10 UNIT/ML
5-20 VIAL (ML) INTRAVENOUS DAILY PRN
Status: CANCELLED | OUTPATIENT
Start: 2025-07-08

## 2025-07-08 RX ADMIN — LEUCOVORIN CALCIUM 700 MG: 500 INJECTION, POWDER, LYOPHILIZED, FOR SOLUTION INTRAMUSCULAR; INTRAVENOUS at 11:04

## 2025-07-08 RX ADMIN — FOSAPREPITANT: 150 INJECTION, POWDER, LYOPHILIZED, FOR SOLUTION INTRAVENOUS at 10:15

## 2025-07-08 RX ADMIN — OXALIPLATIN 130 MG: 5 INJECTION, SOLUTION INTRAVENOUS at 11:05

## 2025-07-08 RX ADMIN — FLUOROURACIL 770 MG: 50 INJECTION, SOLUTION INTRAVENOUS at 13:13

## 2025-07-08 RX ADMIN — DEXTROSE MONOHYDRATE 250 ML: 50 INJECTION, SOLUTION INTRAVENOUS at 10:15

## 2025-07-08 RX ADMIN — ONDANSETRON 8 MG: 2 INJECTION INTRAMUSCULAR; INTRAVENOUS at 10:16

## 2025-07-08 ASSESSMENT — PAIN SCALES - GENERAL: PAINLEVEL_OUTOF10: MODERATE PAIN (5)

## 2025-07-08 NOTE — NURSING NOTE
"Oncology Rooming Note    July 8, 2025 8:40 AM   Tal Mcbride is a 69 year old male who presents for:    Chief Complaint   Patient presents with    Oncology Clinic Visit     Follow up. Malignant neoplasm of rectum   Rectal cancer      Initial Vitals: /62 (BP Location: Left arm, Patient Position: Sitting, Cuff Size: Adult Regular)   Pulse 84   Temp 98.7  F (37.1  C) (Tympanic)   Ht 1.753 m (5' 9\")   Wt 79.5 kg (175 lb 4.3 oz)   SpO2 98%   BMI 25.88 kg/m   Estimated body mass index is 25.88 kg/m  as calculated from the following:    Height as of this encounter: 1.753 m (5' 9\").    Weight as of this encounter: 79.5 kg (175 lb 4.3 oz). Body surface area is 1.97 meters squared.  Moderate Pain (5) Comment: Data Unavailable   No LMP for male patient.  Allergies reviewed: Yes  Medications reviewed: Yes    Medications: Medication refills not needed today.  Pharmacy name entered into [email or web address]:    [facility] - [place], [place] - [address] [facility] - [place], [place] - [address] [facility] [place], [place] - [address] [place] [address]    Frailty Screening:   Is the patient here for a new oncology consult visit in cancer care? 2. No    PHQ9:  Did this patient require a PHQ9?: No      Clinical concerns: was hospitalized [facility] on 7/30/25, unsure what Hgb was but he got 1 unit of blood, ASA placed on hold, started on B12 and Iron with vitamin C. Did complete 4 day once a day dosing Levaquin 750 mg on 7/4/25. Jo  was notified.      Larissa Whiteside LPN              "

## 2025-07-08 NOTE — PROGRESS NOTES
Infusion Nursing Note:  Tal Mcbride presents today for leucovorin, oxaliplatin, fluorouracil push and home pump.    Patient seen by provider today: Yes: Jo Bragg   present during visit today: Not Applicable.    Note: N/A.      Intravenous Access:  Implanted Port.    Treatment Conditions:  Lab Results   Component Value Date    HGB 9.5 (L) 07/08/2025    WBC 10.5 07/08/2025    ANEU 8.3 07/08/2025     07/08/2025        Results reviewed, labs MET treatment parameters, ok to proceed with treatment.

## 2025-07-08 NOTE — PROGRESS NOTES
Oncology Follow-up Visit    Reason for Visit:  Ezra is a 69 year old man with a diagnosis of rectal cancer, who presents to the clinic today for routine follow-up.    Nursing Note and documentation reviewed: Yes    Interval History: Since last being seen by our department, patient underwent radiation therapy. He finished radiation on 6/10 and we allowed a month for recovery prior to resuming chemo. Just last week, he notes that he ended up in the [facility] ER secondary to a cough. I do not have but requested records. Per patient, he did have a CT scan. This did not show pneumonia but they put him on 4 days of levaquin. Notes that cough has persisted. Productive with clear sputum. No fevers. Breathing is fine. No bleeding concerns. Slight nausea, took anti-nausea medication 2 times in the last month. Output varies from colostomy. Did have quite a bit of swelling in ankles in ED. Still there but improved. Energy isn't great. Sounds like he spends a lot of day lying on couch but does get up to make his own lunch and go to the bathroom.     Oncologic History:   Mr. Mcbride started having diarrhea in the end of March 2024.  He was evaluated by his primary care and recommended to undergo a colonoscopy and upper endoscopy.     4/29/2024: EGD:   3 cm hiatal hernia.  Normal stomach.  Normal third portion of the duodenum.  Nonobstructing Schatzki's ring.     4/29/2024: Sigmoidoscopy:   The digital rectal exam revealed a soft tissue mass palpated 8 cm from the anal verge.  The mass was noncircumferential and located predominantly at the posterior bowel wall.  An infiltrative partially obstructing large mass was found in the rectum.  The mass was circumferential.  The mass measured 4 cm in length.  No bleeding was present.  This was biopsied.  A pediatric colonoscope was passed past the area but patient had a large quantity of stool present just above the stricture so colonoscopy was not able to be completed.     Biopsy of the rectal  mass showed moderately to poorly differentiated adenocarcinoma.  ANDI     5/2/2024 CT chest with contrast:   No definite findings for metastatic disease in the chest. 2 small pulmonary nodules measuring up to 4 mm unchanged from prior study      5/2/2024: MRI pelvis with and without contrast:   A carcinoma involving the lower, middle and upper rectum extending over a length of roughly 12 cm demonstrated.  This lesion involves the mesorectum with no significant invasion anterolaterally on the right at the level of the mid rectum.  At this site infiltrative neoplasm is contactless with the posterior margin of the right seminal vesicle but it is not clearly invaded.  Neoplasm extends roughly 1.5 cm beyond the rectal margin.  Numerous enlarged mesorectal lymph nodes are demonstrated.  The larger mesorectal lymph node is demonstrated on the right measuring 1.8X 1.5 cm.  Additional mesorectal lymph nodes with short axis measurements at least 1 cm demonstrated.  Inferior mesenteric lymph nodes with short axis measurement of 1.1 cm X 1.0 cm are noted.  A left internal iliac node measuring 1.2X 0.8 cm is demonstrated.  A 2.7 X1.8 centimeter right iliac node is demonstrated.  The other visualized bowel is unremarkable.  The prostate is mildly enlarged.  No free fluid is demonstrated.     5/13/2024: CT abdomen and pelvis without contrast:   Large infiltrative rectal mass involving the upper middle and lower rectum.  There is surrounding inflammatory change and pelvic free fluid.  There is adjacent mesorectal adenopathy, a left internal iliac node as well.  Large amount of stool throughout the colon.  Enlarged prostate gland.  Left probable UPJ obstruction.     6/6/2024: Diverting colostomy     7/25/2024: PET scan:   Masslike thickening with associated FDG avidity throughout the rectum is consistent with given history.  Metastatic disease within the liver, retroperitoneal nodes, perirectal nodes and inguinal nodes.  Findings are  concerning for a UPJ obstruction.  Nuclear medicine renal Lasix scan for further characterize.     7/24/2024 to 12/23/2024:12 cycles of FOLFOX     12/18/2024: PET scan:  IMPRESSION: Favorable response to therapy. Interval resolution of hypermetabolism associated with a focal metastatic disease to the liver as was seen previously and interval resolution of hypermetabolism within the rectal lesion.     1/11/2025: Started  Capecitabine for maintenance     2/11/2025: Lower EUS:    There  is heavy ulceration with stricturing in the mid rectum      3/26/2025: PET scan:  Significant interval worsening of disease, now with diffuse metastatic disease of the thorax, abdomen and pelvis. Representative lesions as described above.         4/4/2025: Colostomy revision and exam under anesthesia:   Felt to have worsening disease at the rectum therefore AN excision of the rectal mass was not performed.  A colostomy revision was performed.     Residual adenocarcinoma is present in the form of two (2) mesenteric peritoneal deposits, 4mm and 2 mm, respectively  Definitive treatment effect is not seen  Focal mucosal ulceration and granulation tissue-type polyp  Mucosal hemangiomas  Twelve (12) lymph nodes, negative for metastatic carcinoma (0/12)           05/07/2025 Recommenced 5-FU with oxaliplatin again.  Does have some borderline neuropathy and therefore we will dose reduce the oxaliplatin to 65 mg/m .    05/27/2025-06/10/2025 Course of palliative XRT, chemo held for 4 weeks following completion to allow for healing    07/08/2025 Resumed C3 therapy      Current Chemo Regimen/TX: FOLFOX with dose reduced oxali to 65 mg/m2  Current Cycle: 3 (delayed secondary to XRT)  # of completed cycles: 2  ** Plan on holding C3 for 4 weeks after his final XRT treatment      Past Medical History:   Diagnosis Date    Adolescent idiopathic scoliosis of thoracolumbar region 11/16/2017    Bunion 03/28/2013    Essential hypertension 03/05/2012     Formatting of this note might be different from the original.   Epic      Hyperlipidemia 05/12/2025    Malignant neoplasm of rectum (H) 03/13/2025    Metastasis from rectal cancer (H)     Pure hypercholesterolemia 10/04/2012    Rectal cancer (H) 05/29/2024    Scoliosis     Severe protein-calorie malnutrition 06/07/2024    Type 2 diabetes mellitus without complication (H) 06/21/2012       Past Surgical History:   Procedure Laterality Date    CATARACT EXTRACTION W/ INTRAOCULAR LENS IMPLANT Left 2021    ENDOSCOPY N/A 02/11/2025    upper and lower    GI SURGERY  04/04/2025    Colostomy revision    PHACOEMULSIFICATION WITH STANDARD INTRAOCULAR LENS IMPLANT Right 09/29/2021    Procedure: right Cataract Extraction with Implant;  Surgeon: Juan Prado MD;  Location: [facility]    REVISION COLOSTOMY N/A 04/07/2025    robotic colostomy N/A 06/06/2024       Family History   Problem Relation Age of Onset    Hypertension Mother     Pancreatic Cancer Father 55    Hypertension Sister     Other Problems  (knee replacement) Sister         bilateal knee replacement    Other Problems  (hip replacement) Sister         bilateral hip replacement    Diabetes No family hx of     Coronary Artery Disease No family hx of     Hyperlipidemia No family hx of     Cerebrovascular Disease No family hx of     Breast Cancer No family hx of     Colon Cancer No family hx of     Prostate Cancer No family hx of     Anesthesia Reaction No family hx of     Asthma No family hx of     Genetic Disorder No family hx of     Thyroid Disease No family hx of        Social History     Socioeconomic History    Marital status:      Spouse name: Gayle    Number of children: 0    Years of education: Not on file    Highest education level: Not on file   Occupational History    Occupation: worked at [employer]     Comment: on medical leave    Occupation:      Comment: retired   Tobacco Use    Smoking status: Former     Current packs/day: 0.00      Average packs/day: 0.5 packs/day for 15.0 years (7.5 ttl pk-yrs)     Types: Cigarettes     Start date:      Quit date:      Years since quittin.5     Passive exposure: Past    Smokeless tobacco: Former    Tobacco comments:     Chewed for about 15 years when he smoked.    Vaping Use    Vaping status: Never Used   Substance and Sexual Activity    Alcohol use: Not Currently    Drug use: Never    Sexual activity: Yes   Other Topics Concern    Parent/sibling w/ CABG, MI or angioplasty before 65F 55M? No   Social History Narrative    Not on file     Social Drivers of Health     Financial Resource Strain: Not on file   Food Insecurity: No Food Insecurity (2024)    [facility]    Hunger Vital Sign     Worried About Running Out of Food in the Last Year: Never true     Ran Out of Food in the Last Year: Never true   Transportation Needs: No Transportation Needs (2024)    Received from [facility] [employer] [facility]    PRAPARE - Transportation     Lack of Transportation (Medical): No     Lack of Transportation (Non-Medical): No   Physical Activity: Not on file   Stress: Not on file   Social Connections: Not on file   Interpersonal Safety: Low Risk  (2025)    Interpersonal Safety     Do you feel physically and emotionally safe where you currently live?: Yes     Within the past 12 months, have you been hit, slapped, kicked or otherwise physically hurt by someone?: No     Within the past 12 months, have you been humiliated or emotionally abused in other ways by your partner or ex-partner?: No   Housing Stability: High Risk (2024)    [facility]    Housing Stability Vital Sign     Unable to Pay for Housing in the Last Year: No     Number of Times Moved in the Last Year: 2     Homeless in the Last Year: No       Current Outpatient Medications   Medication Sig Dispense Refill     acetaminophen (TYLENOL) 500 MG tablet Take 650 mg by mouth every 6 hours as needed for mild pain.      aspirin 81 MG EC tablet Take 81 mg by mouth daily      atorvastatin (LIPITOR) 20 MG tablet Take 20 mg by mouth daily      dexAMETHasone (DECADRON) 4 MG tablet Take 2 tablets (8 mg) by mouth daily. Take for 2 days, starting the day after chemo. Take with food. 4 tablet 2    fentaNYL (DURAGESIC) 12 mcg/hr 72 hr patch Place 1 patch over 72 hours onto the skin every 72 hours. remove old patch. 5 patch 0    Fluorouracil (ADRUCIL) 4,630 mg in sodium chloride 0.9 % 230 mL via CADD pump Infuse 4,630 mg at 5 mL/hr over 46 hours into the vein once for 1 dose. 249540 mL 0    gabapentin (NEURONTIN) 300 MG capsule Take 900 mg by mouth 3 times daily.      glipiZIDE (GLUCOTROL XL) 2.5 MG 24 hr tablet Take 2.5 mg by mouth daily. Outside prescriber      hydrochlorothiazide (HYDRODIURIL) 25 MG tablet Take 25 mg by mouth daily      lidocaine 5% oint/silver sulfadiazine 1% cm/triamcinolone 0.1% cm (ANA LUISA PASTE) compounded ointment Apply topically 4 times daily as needed. 100 g 2    lidocaine-prilocaine (EMLA) 2.5-2.5 % external cream Apply topically daily as needed for moderate pain 30 g 1    lisinopril (ZESTRIL) 40 MG tablet Take 40 mg by mouth daily      loperamide (IMODIUM A-D) 2 MG tablet Take 1 tablet (2 mg) by mouth 4 times daily as needed for diarrhea (Patient not taking: No sig reported) 90 tablet 0    metFORMIN (GLUCOPHAGE-XR) 750 MG 24 hr tablet Take 500 mg by mouth 2 times daily (with meals).      multivitamin w/minerals (MULTI-VITAMIN) tablet Take 1 tablet by mouth daily      nitroGLYcerin (RECTIV) 0.4 % OINT rectal ointment Apply 1 inch topically every 12 hours. (Patient not taking: Reported on 6/10/2025)      ondansetron (ZOFRAN ODT) 4 MG ODT tab Take 4 mg by mouth every 8 hours as needed for nausea. (Patient not taking: No sig reported)      ondansetron (ZOFRAN) 8 MG tablet Take 1 tablet (8 mg) by mouth every 8 hours as  needed for nausea (vomiting). (Patient not taking: No sig reported) 30 tablet 2    Oxymetazoline HCl (NASAL DECONGESTANT SPRAY NA) Spray in nostril as needed.      polyethylene glycol (MIRALAX) 17 GM/Dose powder Take 1 Capful by mouth daily.      Polyethylene Glycol 3350 (MIRALAX PO) Take 1 Capful by mouth daily      prochlorperazine (COMPAZINE) 10 MG tablet Take 1 tablet (10 mg) by mouth every 6 hours as needed for nausea or vomiting. (Patient not taking: Reported on 5/19/2025) 30 tablet 2    traZODone (DESYREL) 100 MG tablet Take 100 mg by mouth at bedtime.       No current facility-administered medications for this visit.        No Known Allergies    Review Of Systems:  A complete review of systems is negative except for the above mentioned items in the interval history.     ECOG Performance Status: 0    Physical Exam:  There were no vitals taken for this visit.  GENERAL APPEARANCE: Healthy, alert and in no acute distress.  HEENT: Eyes appear normal without scleral icterus. Extraocular movements intact.   NECK:   Supple with normal range of motion. No asymmetry.  RESP: Lungs clear to auscultation bilaterally, respirations regular and easy.  CARDIOVASCULAR: Regular rate and rhythm. Normal S1, S2; no murmur, gallop, or rub.  ABDOMEN: Soft, non-tender, non-distended. Stoma pink and moist with good output.   MUSCULOSKELETAL: Extremities without gross deformities noted.   SKIN: No suspicious lesions or rashes.  NEURO: Alert and oriented x 3.  Gait steady.  PSYCHIATRIC: Mentation and affect appear normal.  Mood appropriate.    Laboratory:  Results for orders placed or performed in visit on 07/08/25   CBC with platelets and differential     Status: Abnormal   Result Value Ref Range    WBC Count 10.5 4.0 - 11.0 10e3/uL    RBC Count 3.23 (L) 4.40 - 5.90 10e6/uL    Hemoglobin 9.5 (L) 13.3 - 17.7 g/dL    Hematocrit 29.2 (L) 40.0 - 53.0 %    MCV 90 78 - 100 fL    MCH 29.4 26.5 - 33.0 pg    MCHC 32.5 31.5 - 36.5 g/dL    RDW  15.1 (H) 10.0 - 15.0 %    Platelet Count 317 150 - 450 10e3/uL    % Neutrophils 79 %    % Lymphocytes 7 %    % Monocytes 8 %    % Eosinophils 4 %    % Basophils 1 %    % Immature Granulocytes 1 %    NRBCs per 100 WBC 0 <1 /100    Absolute Neutrophils 8.3 1.6 - 8.3 10e3/uL    Absolute Lymphocytes 0.7 (L) 0.8 - 5.3 10e3/uL    Absolute Monocytes 0.9 0.0 - 1.3 10e3/uL    Absolute Eosinophils 0.4 0.0 - 0.7 10e3/uL    Absolute Basophils 0.1 0.0 - 0.2 10e3/uL    Absolute Immature Granulocytes 0.1 <=0.4 10e3/uL    Absolute NRBCs 0.0 10e3/uL   CBC with platelets differential     Status: Abnormal    Narrative    The following orders were created for panel order CBC with platelets differential.  Procedure                               Abnormality         Status                     ---------                               -----------         ------                     CBC with platelets and ...[[id number]]  Abnormal            Final result                 Please view results for these tests on the individual orders.         Imaging Studies:    None this visit    ASSESSMENT/PLAN:    #1 Stage IV rectal cancer: K-pastora wild-type, pMMR     Initially presented with diarrhea in March 2024.  Sigmoidoscopy showed rectal mass infiltrative partially obstructing large mass 8 cm from the anal verge.  MRI pelvis showed carcinoma involving lower, middle and upper rectum extending over a length of roughly 12 cm.  Lesion involves the mesorectum but most significant invasion anterolaterally on the right at the level of the mid rectum.  Neoplasm is contiguous with the posterior margin of the right seminal vesicle but is not clearly invading it.  Extends roughly 1.5 cm beyond the rectal margin.  It also showed numerous enlarged mesorectal lymph nodes.  A inferior mesenteric lymph node was also noted.  There were also enlarged left iliac nodes and right iliac node.     He had already had a diverting ostomy by  on 6/6/2024.     He was then  found to have metastatic disease on PET scan.   PET scan showed 5 foci of abnormal avidity present within the liver, involvement of the common iliac lymph node, upper abdominal para-aortic lymph node, right common iliac node and left perirectal node.  A few inguinal nodes are also avid.     He had completed 12 cycles of FOLFOX from 7/2024-12/2024.  PET scan 9/12/2024 showed very good response.  For his convenience as he was having difficulty with the 5-FU pump, the decision was made to switch to oral capecitabine for maintenance.  He then started maintenance capecitabine Jan 2025     He started treatment on 1/11/2025 but was held when he went to ED on 2/3/2025. Bleeding improved.     He met with his colorectal surgeon, Dr. Cordova for discussion regarding APR with a permanent colostomy. He was taken to surgery on 4/4/2025.  Given the extent of the rectal mass a APR could not be performed however he did have an ostomy revision.     He was noted to have progression systemically on PET scan done 3/26/2025.  He met with Dr. Poon and ultimately recommenced FOLFOX but with oxali dose reduced given prior neuropathy. Following C2, underwent a course of palliative radiation to the rectum. C3 was delayed about 4 weeks for healing. Notes that primary concerns with radiation were skin toxicity and fatigue.     Today, he returns for that delayed C3. Recovering but still dealing with some fatigue, rectal drainage. Was seen in [place] ED. Will acquire those records. Today, clinically, he is doing well and I see no contraindications to proceeding with therapy. His labs are also looking good. Proceed with C3 today. I will see him back in 2 weeks for C4. Will discuss type and timing of scans with Dr. Poon.     Addendum: Spoke with Dr. Poon, will update scans now to get a baseline since he has been off treatment. Last scans in march. CT CAP orders placed.      2. Fatigue Discussed cancer rehab with patient. Declining secondary to  transportation. Strongly encouraged him to try to get a bit more activity, resting if able. Activity can help with fatigue, edema, etc.     3. Cough Lung clear, labs without signs of infection. Clinically, he is doing well. Discussed that a cough can linger. No need for repeat imaging, no need for further antibiotics at this point but will monitor. Discussed temp of 100.4 or greater needing evaluation. Will reach out if symptoms worsen.     4. Chemo induced neuropathy   Since prior Oxaliplatin. Will leave doses reduced at 65 mg/m2. Gabapentin 900 mg TID helping significantly with symptoms. Will monitor.       Patient in agreement with plan and verbalizes understanding. Agrees to call with any questions or concerns.    55 minutes spent in the patient's encounter today with time spent in review of patient's chart along with chart preparation and review of the treatment plan and signing of treatment plan.  Time was also spent with the patient in obtaining a review of systems and performing a physical exam along with detailed review of all test results. Time was also spent in discussing plan for future follow-up and relating instructions for follow-up and in placing future orders.    DANIEL Lopez [facility]  Medical Oncology

## 2025-07-08 NOTE — PROGRESS NOTES
Patients power port accessed using non-coring, 19 gauge, 3/4 inch needle.    Mask donned by caregiver: yes Site prepped with CHG: yes Labs drawn: yes Dressing applied using aseptic technique: yes.     Port accessed per facility protocol. Port flushed easily, blood return noted.  No signs and symptoms of infection or infiltration.  Port flushed with 10mL normal saline, blood return noted, 10 mLs blood discarded. Blood taken for ordered labs, port flushed with 20mL normal saline.  Port left accessed as patient has appointment with Saida Bragg, and is then due for chemo if parameters are met.  Patient discharged with no complaints.

## 2025-07-08 NOTE — PROGRESS NOTES
Reviewed [place] ED notes. CXR showed bilateral ground glass opacities. Treated with 4 days levaquin. Still has couhg, afebrile. Given oncology status and resuming chemo, will treat with 7 more days levaquin.    DANIEL Lopez CNP

## 2025-07-08 NOTE — PROGRESS NOTES
Post Infusion Assessment:  Patient tolerated infusion without incident.  Site patent and intact, free from redness, edema or discomfort.  No evidence of extravasations.   Pt hooked up to home fluorocil via cadd pump without incident. Will go to [facility] for pump off.     Discharge Plan:   Patient and/or family verbalized understanding of discharge instructions and all questions answered.

## 2025-07-10 ENCOUNTER — TRANSFERRED RECORDS (OUTPATIENT)
Dept: HEALTH INFORMATION MANAGEMENT | Facility: CLINIC | Age: 70
End: 2025-07-10

## 2025-07-16 DIAGNOSIS — C20 RECTAL CANCER (H): ICD-10-CM

## 2025-07-17 DIAGNOSIS — C20 RECTAL CANCER (H): Primary | ICD-10-CM

## 2025-07-17 NOTE — PROGRESS NOTES
Called patient to discuss findings of CT chest done at [place].  CT chest does show evidence of metastatic disease however we discussed it is difficult to ascertain if there has been any progression as this was not compared to previous scan.  At this time he has completed radiation on 6/10/2025.  He received chemotherapy on 7/8/2025.  This was cycle 3.  I would recommend he complete up to cycle 6 and then we do a PET scan.  His wife will require help with transportation.  Therefore the PET scan was ordered today and once it is scheduled we will social work to help us arrange transport.

## 2025-07-17 NOTE — Clinical Note
Order placed for PET scan in September.  He may need transportation during this time can you please arrange that

## 2025-07-18 ENCOUNTER — HOME INFUSION BILLING (OUTPATIENT)
Dept: HOME HEALTH SERVICES | Facility: HOME HEALTH | Age: 70
End: 2025-07-18
Payer: COMMERCIAL

## 2025-07-22 ENCOUNTER — ONCOLOGY VISIT (OUTPATIENT)
Dept: ONCOLOGY | Facility: OTHER | Age: 70
End: 2025-07-22
Attending: NURSE PRACTITIONER
Payer: COMMERCIAL

## 2025-07-22 ENCOUNTER — LAB (OUTPATIENT)
Dept: ONCOLOGY | Facility: OTHER | Age: 70
End: 2025-07-22
Attending: FAMILY MEDICINE
Payer: COMMERCIAL

## 2025-07-22 ENCOUNTER — INFUSION THERAPY VISIT (OUTPATIENT)
Dept: INFUSION THERAPY | Facility: OTHER | Age: 70
End: 2025-07-22
Attending: NURSE PRACTITIONER
Payer: COMMERCIAL

## 2025-07-22 VITALS
OXYGEN SATURATION: 98 % | HEIGHT: 69 IN | BODY MASS INDEX: 24.88 KG/M2 | SYSTOLIC BLOOD PRESSURE: 106 MMHG | HEART RATE: 66 BPM | WEIGHT: 167.99 LBS | TEMPERATURE: 97.8 F | DIASTOLIC BLOOD PRESSURE: 60 MMHG

## 2025-07-22 DIAGNOSIS — R53.83 CHEMOTHERAPY-INDUCED FATIGUE: ICD-10-CM

## 2025-07-22 DIAGNOSIS — T45.1X5A CHEMOTHERAPY-INDUCED NEUROPATHY: ICD-10-CM

## 2025-07-22 DIAGNOSIS — R35.89 POLYURIA: ICD-10-CM

## 2025-07-22 DIAGNOSIS — C20 RECTAL CANCER (H): Primary | ICD-10-CM

## 2025-07-22 DIAGNOSIS — T45.1X5A CHEMOTHERAPY-INDUCED FATIGUE: ICD-10-CM

## 2025-07-22 DIAGNOSIS — G62.0 CHEMOTHERAPY-INDUCED NEUROPATHY: ICD-10-CM

## 2025-07-22 LAB
ALBUMIN SERPL BCG-MCNC: 3.5 G/DL (ref 3.5–5.2)
ALBUMIN UR-MCNC: 20 MG/DL
ALP SERPL-CCNC: 139 U/L (ref 40–150)
ALT SERPL W P-5'-P-CCNC: 12 U/L (ref 0–70)
ANION GAP SERPL CALCULATED.3IONS-SCNC: 15 MMOL/L (ref 7–15)
APPEARANCE UR: CLEAR
AST SERPL W P-5'-P-CCNC: 38 U/L (ref 0–45)
BASOPHILS # BLD AUTO: 0.1 10E3/UL (ref 0–0.2)
BASOPHILS NFR BLD AUTO: 1 %
BILIRUB SERPL-MCNC: 0.3 MG/DL
BILIRUB UR QL STRIP: NEGATIVE
BUN SERPL-MCNC: 24.6 MG/DL (ref 8–23)
CALCIUM SERPL-MCNC: 9.5 MG/DL (ref 8.8–10.4)
CEA SERPL-MCNC: 97.5 NG/ML
CHLORIDE SERPL-SCNC: 101 MMOL/L (ref 98–107)
COLOR UR AUTO: YELLOW
CREAT SERPL-MCNC: 1.52 MG/DL (ref 0.67–1.17)
EGFRCR SERPLBLD CKD-EPI 2021: 49 ML/MIN/1.73M2
EOSINOPHIL # BLD AUTO: 0.3 10E3/UL (ref 0–0.7)
EOSINOPHIL NFR BLD AUTO: 3 %
ERYTHROCYTE [DISTWIDTH] IN BLOOD BY AUTOMATED COUNT: 15.7 % (ref 10–15)
GLUCOSE SERPL-MCNC: 138 MG/DL (ref 70–99)
GLUCOSE UR STRIP-MCNC: 50 MG/DL
HCO3 SERPL-SCNC: 22 MMOL/L (ref 22–29)
HCT VFR BLD AUTO: 30 % (ref 40–53)
HGB BLD-MCNC: 9.9 G/DL (ref 13.3–17.7)
HGB UR QL STRIP: NEGATIVE
HYALINE CASTS: 5 /LPF
IMM GRANULOCYTES # BLD: 0.1 10E3/UL
IMM GRANULOCYTES NFR BLD: 1 %
KETONES UR STRIP-MCNC: NEGATIVE MG/DL
LEUKOCYTE ESTERASE UR QL STRIP: NEGATIVE
LYMPHOCYTES # BLD AUTO: 0.7 10E3/UL (ref 0.8–5.3)
LYMPHOCYTES NFR BLD AUTO: 8 %
MCH RBC QN AUTO: 29 PG (ref 26.5–33)
MCHC RBC AUTO-ENTMCNC: 33 G/DL (ref 31.5–36.5)
MCV RBC AUTO: 88 FL (ref 78–100)
MONOCYTES # BLD AUTO: 0.9 10E3/UL (ref 0–1.3)
MONOCYTES NFR BLD AUTO: 10 %
MUCOUS THREADS #/AREA URNS LPF: PRESENT /LPF
NEUTROPHILS # BLD AUTO: 6.8 10E3/UL (ref 1.6–8.3)
NEUTROPHILS NFR BLD AUTO: 78 %
NITRATE UR QL: NEGATIVE
NRBC # BLD AUTO: 0 10E3/UL
NRBC BLD AUTO-RTO: 0 /100
PH UR STRIP: 6 [PH] (ref 4.7–8)
PLATELET # BLD AUTO: 279 10E3/UL (ref 150–450)
POTASSIUM SERPL-SCNC: 4.3 MMOL/L (ref 3.4–5.3)
PROT SERPL-MCNC: 6.6 G/DL (ref 6.4–8.3)
RBC # BLD AUTO: 3.41 10E6/UL (ref 4.4–5.9)
RBC URINE: 0 /HPF
SODIUM SERPL-SCNC: 138 MMOL/L (ref 135–145)
SP GR UR STRIP: 1.02 (ref 1–1.03)
SQUAMOUS EPITHELIAL: 0 /HPF
UROBILINOGEN UR STRIP-MCNC: NORMAL MG/DL
WBC # BLD AUTO: 8.7 10E3/UL (ref 4–11)
WBC URINE: <1 /HPF

## 2025-07-22 PROCEDURE — 36415 COLL VENOUS BLD VENIPUNCTURE: CPT | Performed by: INTERNAL MEDICINE

## 2025-07-22 PROCEDURE — 250N000011 HC RX IP 250 OP 636: Mod: JZ | Performed by: NURSE PRACTITIONER

## 2025-07-22 PROCEDURE — G0463 HOSPITAL OUTPT CLINIC VISIT: HCPCS

## 2025-07-22 PROCEDURE — 85004 AUTOMATED DIFF WBC COUNT: CPT | Mod: ZL | Performed by: INTERNAL MEDICINE

## 2025-07-22 PROCEDURE — 258N000003 HC RX IP 258 OP 636: Performed by: NURSE PRACTITIONER

## 2025-07-22 PROCEDURE — 81001 URINALYSIS AUTO W/SCOPE: CPT | Mod: ZL

## 2025-07-22 PROCEDURE — 82310 ASSAY OF CALCIUM: CPT | Mod: ZL | Performed by: INTERNAL MEDICINE

## 2025-07-22 PROCEDURE — 82378 CARCINOEMBRYONIC ANTIGEN: CPT | Mod: ZL

## 2025-07-22 RX ORDER — DIPHENHYDRAMINE HYDROCHLORIDE 50 MG/ML
50 INJECTION, SOLUTION INTRAMUSCULAR; INTRAVENOUS
Start: 2025-08-04

## 2025-07-22 RX ORDER — METHYLPREDNISOLONE SODIUM SUCCINATE 40 MG/ML
40 INJECTION INTRAMUSCULAR; INTRAVENOUS
Status: CANCELLED
Start: 2025-07-22

## 2025-07-22 RX ORDER — ALBUTEROL SULFATE 0.83 MG/ML
2.5 SOLUTION RESPIRATORY (INHALATION)
Status: CANCELLED | OUTPATIENT
Start: 2025-07-22

## 2025-07-22 RX ORDER — ALBUTEROL SULFATE 0.83 MG/ML
2.5 SOLUTION RESPIRATORY (INHALATION)
OUTPATIENT
Start: 2025-08-04

## 2025-07-22 RX ORDER — ONDANSETRON 2 MG/ML
8 INJECTION INTRAMUSCULAR; INTRAVENOUS ONCE
Status: COMPLETED | OUTPATIENT
Start: 2025-07-22 | End: 2025-07-22

## 2025-07-22 RX ORDER — LORAZEPAM 2 MG/ML
0.5 INJECTION INTRAMUSCULAR EVERY 4 HOURS PRN
Status: CANCELLED | OUTPATIENT
Start: 2025-07-22

## 2025-07-22 RX ORDER — ALBUTEROL SULFATE 90 UG/1
1-2 INHALANT RESPIRATORY (INHALATION)
Status: CANCELLED
Start: 2025-07-22

## 2025-07-22 RX ORDER — HEPARIN SODIUM (PORCINE) LOCK FLUSH IV SOLN 100 UNIT/ML 100 UNIT/ML
5 SOLUTION INTRAVENOUS
OUTPATIENT
Start: 2025-08-06

## 2025-07-22 RX ORDER — HEPARIN SODIUM (PORCINE) LOCK FLUSH IV SOLN 100 UNIT/ML 100 UNIT/ML
5 SOLUTION INTRAVENOUS
OUTPATIENT
Start: 2025-08-04

## 2025-07-22 RX ORDER — DEXAMETHASONE 4 MG/1
8 TABLET ORAL DAILY
Qty: 4 TABLET | Refills: 2 | Status: SHIPPED | OUTPATIENT
Start: 2025-07-22

## 2025-07-22 RX ORDER — DIPHENHYDRAMINE HYDROCHLORIDE 50 MG/ML
50 INJECTION, SOLUTION INTRAMUSCULAR; INTRAVENOUS
Status: CANCELLED
Start: 2025-07-22

## 2025-07-22 RX ORDER — HEPARIN SODIUM (PORCINE) LOCK FLUSH IV SOLN 100 UNIT/ML 100 UNIT/ML
5 SOLUTION INTRAVENOUS
Status: CANCELLED | OUTPATIENT
Start: 2025-07-22

## 2025-07-22 RX ORDER — HEPARIN SODIUM,PORCINE 10 UNIT/ML
5-20 VIAL (ML) INTRAVENOUS DAILY PRN
OUTPATIENT
Start: 2025-07-23

## 2025-07-22 RX ORDER — HEPARIN SODIUM,PORCINE 10 UNIT/ML
5-20 VIAL (ML) INTRAVENOUS DAILY PRN
Status: CANCELLED | OUTPATIENT
Start: 2025-07-22

## 2025-07-22 RX ORDER — ONDANSETRON 2 MG/ML
8 INJECTION INTRAMUSCULAR; INTRAVENOUS ONCE
OUTPATIENT
Start: 2025-08-04

## 2025-07-22 RX ORDER — HEPARIN SODIUM (PORCINE) LOCK FLUSH IV SOLN 100 UNIT/ML 100 UNIT/ML
5 SOLUTION INTRAVENOUS
OUTPATIENT
Start: 2025-07-23

## 2025-07-22 RX ORDER — HEPARIN SODIUM,PORCINE 10 UNIT/ML
5-20 VIAL (ML) INTRAVENOUS DAILY PRN
OUTPATIENT
Start: 2025-08-06

## 2025-07-22 RX ORDER — DIPHENHYDRAMINE HYDROCHLORIDE 50 MG/ML
25 INJECTION, SOLUTION INTRAMUSCULAR; INTRAVENOUS
Status: CANCELLED
Start: 2025-07-22

## 2025-07-22 RX ORDER — DIPHENHYDRAMINE HYDROCHLORIDE 50 MG/ML
25 INJECTION, SOLUTION INTRAMUSCULAR; INTRAVENOUS
Start: 2025-08-04

## 2025-07-22 RX ORDER — ALBUTEROL SULFATE 90 UG/1
1-2 INHALANT RESPIRATORY (INHALATION)
Start: 2025-08-04

## 2025-07-22 RX ORDER — HEPARIN SODIUM,PORCINE 10 UNIT/ML
5-20 VIAL (ML) INTRAVENOUS DAILY PRN
OUTPATIENT
Start: 2025-08-04

## 2025-07-22 RX ORDER — FLUOROURACIL 50 MG/ML
400 INJECTION, SOLUTION INTRAVENOUS ONCE
Status: COMPLETED | OUTPATIENT
Start: 2025-07-22 | End: 2025-07-22

## 2025-07-22 RX ORDER — MEPERIDINE HYDROCHLORIDE 25 MG/ML
25 INJECTION INTRAMUSCULAR; INTRAVENOUS; SUBCUTANEOUS
OUTPATIENT
Start: 2025-08-04

## 2025-07-22 RX ORDER — EPINEPHRINE 1 MG/ML
0.3 INJECTION, SOLUTION, CONCENTRATE INTRAVENOUS EVERY 5 MIN PRN
Status: CANCELLED | OUTPATIENT
Start: 2025-07-22

## 2025-07-22 RX ORDER — MEPERIDINE HYDROCHLORIDE 25 MG/ML
25 INJECTION INTRAMUSCULAR; INTRAVENOUS; SUBCUTANEOUS
Status: CANCELLED | OUTPATIENT
Start: 2025-07-22

## 2025-07-22 RX ORDER — EPINEPHRINE 1 MG/ML
0.3 INJECTION, SOLUTION, CONCENTRATE INTRAVENOUS EVERY 5 MIN PRN
OUTPATIENT
Start: 2025-08-04

## 2025-07-22 RX ORDER — LORAZEPAM 2 MG/ML
0.5 INJECTION INTRAMUSCULAR EVERY 4 HOURS PRN
OUTPATIENT
Start: 2025-08-04

## 2025-07-22 RX ORDER — ONDANSETRON 2 MG/ML
8 INJECTION INTRAMUSCULAR; INTRAVENOUS ONCE
Status: CANCELLED | OUTPATIENT
Start: 2025-07-22

## 2025-07-22 RX ORDER — METHYLPREDNISOLONE SODIUM SUCCINATE 40 MG/ML
40 INJECTION INTRAMUSCULAR; INTRAVENOUS
Start: 2025-08-04

## 2025-07-22 RX ORDER — GABAPENTIN 300 MG/1
900 CAPSULE ORAL 3 TIMES DAILY
Qty: 270 CAPSULE | Refills: 0 | Status: SHIPPED | OUTPATIENT
Start: 2025-07-22

## 2025-07-22 RX ORDER — FLUOROURACIL 50 MG/ML
400 INJECTION, SOLUTION INTRAVENOUS ONCE
OUTPATIENT
Start: 2025-08-04

## 2025-07-22 RX ORDER — FLUOROURACIL 50 MG/ML
400 INJECTION, SOLUTION INTRAVENOUS ONCE
Status: CANCELLED | OUTPATIENT
Start: 2025-07-22

## 2025-07-22 RX ADMIN — OXALIPLATIN 130 MG: 5 INJECTION, SOLUTION INTRAVENOUS at 10:26

## 2025-07-22 RX ADMIN — FOSAPREPITANT: 150 INJECTION, POWDER, LYOPHILIZED, FOR SOLUTION INTRAVENOUS at 09:51

## 2025-07-22 RX ADMIN — FLUOROURACIL 770 MG: 50 INJECTION, SOLUTION INTRAVENOUS at 12:36

## 2025-07-22 RX ADMIN — DEXTROSE MONOHYDRATE 250 ML: 50 INJECTION, SOLUTION INTRAVENOUS at 09:49

## 2025-07-22 RX ADMIN — ONDANSETRON 8 MG: 2 INJECTION INTRAMUSCULAR; INTRAVENOUS at 09:47

## 2025-07-22 RX ADMIN — LEUCOVORIN CALCIUM 700 MG: 500 INJECTION, POWDER, LYOPHILIZED, FOR SOLUTION INTRAMUSCULAR; INTRAVENOUS at 10:25

## 2025-07-22 ASSESSMENT — PAIN SCALES - GENERAL: PAINLEVEL_OUTOF10: MODERATE PAIN (4)

## 2025-07-22 NOTE — PROGRESS NOTES
"Infusion Nursing Note:  Tal Mcbride presents today for oxaliplatin, leucovorin, fluorouracil.    Patient seen by provider today: Yes: Jo Bragg   present during visit today: Not Applicable.    Note: N/A.      Intravenous Access:  Implanted Port.    Treatment Conditions:  Lab Results   Component Value Date    HGB 9.9 (L) 07/22/2025    WBC 8.7 07/22/2025    ANEU 6.8 07/22/2025     07/22/2025        Results reviewed, labs MET treatment parameters, ok to proceed with treatment.      Post Infusion Assessment:  Patient tolerated infusion without incident.  Blood return noted pre and post infusion.  Site patent and intact, free from redness, edema or discomfort.  No evidence of extravasations.   Prior to discharge: Port is secured in place with tegaderm and flushed with 10cc NS with positive blood return noted.  Continuous home infusion CADD pump connected.    All connectors secured in place and clamps taped open.    Pump started, \"running\" noted on display (CADD): YES.  Patient instructed to call our clinic or [facility] with any questions or concerns at home.  Patient verbalized understanding.    Patient set up for pump disconnect at [facility] will contact Pt and wife to report time of appt.        Discharge Plan:   Patient discharged in stable condition accompanied by: wife.  Departure Mode: Ambulatory and Wheelchair.  Pt and wife brought CADD pump to be returned to [facility].          "

## 2025-07-22 NOTE — NURSING NOTE
"Oncology Rooming Note    July 22, 2025 9:31 AM   Tal Mcbride is a 69 year old male who presents for:    Chief Complaint   Patient presents with    Oncology Clinic Visit     Follow up. Rectal cancer     Initial Vitals: /60 (BP Location: Right arm, Patient Position: Sitting, Cuff Size: Adult Regular)   Pulse 66   Temp 97.8  F (36.6  C) (Tympanic)   Ht 1.753 m (5' 9\")   Wt 76.2 kg (167 lb 15.9 oz)   SpO2 98%   BMI 24.81 kg/m   Estimated body mass index is 24.81 kg/m  as calculated from the following:    Height as of this encounter: 1.753 m (5' 9\").    Weight as of this encounter: 76.2 kg (167 lb 15.9 oz). Body surface area is 1.93 meters squared.  Moderate Pain (4) Comment: Data Unavailable   No LMP for male patient.  Allergies reviewed: Yes  Medications reviewed: Yes    Medications: Medication refills not needed today.  Pharmacy name entered into [employer]:    [facility] - [place], [place] - [address] [facility] - [place], [place] - [address] [facility] [place], [place] - [address]    PHQ9:  Did this patient require a PHQ9?: No      Clinical concerns: discussed with austen Whiteside LPN              "

## 2025-07-22 NOTE — PROGRESS NOTES
"Oncology Follow-up Visit    Reason for Visit:  Ezra is a 69 year old man with a diagnosis of rectal cancer, who presents to the clinic today for routine follow-up.    Nursing Note and documentation reviewed: Yes    Provider Location: [place]    Interval History: Ezra notes that his biggest complaint today is that of fatigue. He feels that this is associated with not taking his iron or B12 for the last week. Resumed and states he is feeling a bit better. Hasn't been interested in cancer rehab but trying to get out and walk more at home pending air quality. Notes that swelling in legs has improved.     No recent signs of infection. Some lingering cough but no fevers, chills, or chest pain. Frequent urination. No bleeding concerns. No big nausea. Output via colostomy varies, using Miralax daily. Continues to endorse neuropathy. Wife states that she doesn't think this is helping, that is only has \"15% improvement\" but Ezra states that he sees definite improvement once her takes his gabapentin.     Ezra does note that he feels the rectal mass is getting smaller.     Oncologic History:   Mr. Mcbride started having diarrhea in the end of March 2024.  He was evaluated by his primary care and recommended to undergo a colonoscopy and upper endoscopy.     4/29/2024: EGD:   3 cm hiatal hernia.  Normal stomach.  Normal third portion of the duodenum.  Nonobstructing Schatzki's ring.     4/29/2024: Sigmoidoscopy:   The digital rectal exam revealed a soft tissue mass palpated 8 cm from the anal verge.  The mass was noncircumferential and located predominantly at the posterior bowel wall.  An infiltrative partially obstructing large mass was found in the rectum.  The mass was circumferential.  The mass measured 4 cm in length.  No bleeding was present.  This was biopsied.  A pediatric colonoscope was passed past the area but patient had a large quantity of stool present just above the stricture so colonoscopy was not able to be " completed.     Biopsy of the rectal mass showed moderately to poorly differentiated adenocarcinoma.  ANDI     5/2/2024 CT chest with contrast:   No definite findings for metastatic disease in the chest. 2 small pulmonary nodules measuring up to 4 mm unchanged from prior study      5/2/2024: MRI pelvis with and without contrast:   A carcinoma involving the lower, middle and upper rectum extending over a length of roughly 12 cm demonstrated.  This lesion involves the mesorectum with no significant invasion anterolaterally on the right at the level of the mid rectum.  At this site infiltrative neoplasm is contactless with the posterior margin of the right seminal vesicle but it is not clearly invaded.  Neoplasm extends roughly 1.5 cm beyond the rectal margin.  Numerous enlarged mesorectal lymph nodes are demonstrated.  The larger mesorectal lymph node is demonstrated on the right measuring 1.8X 1.5 cm.  Additional mesorectal lymph nodes with short axis measurements at least 1 cm demonstrated.  Inferior mesenteric lymph nodes with short axis measurement of 1.1 cm X 1.0 cm are noted.  A left internal iliac node measuring 1.2X 0.8 cm is demonstrated.  A 2.7 X1.8 centimeter right iliac node is demonstrated.  The other visualized bowel is unremarkable.  The prostate is mildly enlarged.  No free fluid is demonstrated.     5/13/2024: CT abdomen and pelvis without contrast:   Large infiltrative rectal mass involving the upper middle and lower rectum.  There is surrounding inflammatory change and pelvic free fluid.  There is adjacent mesorectal adenopathy, a left internal iliac node as well.  Large amount of stool throughout the colon.  Enlarged prostate gland.  Left probable UPJ obstruction.     6/6/2024: Diverting colostomy     7/25/2024: PET scan:   Masslike thickening with associated FDG avidity throughout the rectum is consistent with given history.  Metastatic disease within the liver, retroperitoneal nodes, perirectal  nodes and inguinal nodes.  Findings are concerning for a UPJ obstruction.  Nuclear medicine renal Lasix scan for further characterize.     7/24/2024 to 12/23/2024:12 cycles of FOLFOX     12/18/2024: PET scan:  IMPRESSION: Favorable response to therapy. Interval resolution of hypermetabolism associated with a focal metastatic disease to the liver as was seen previously and interval resolution of hypermetabolism within the rectal lesion.     1/11/2025: Started  Capecitabine for maintenance     2/11/2025: Lower EUS:    There  is heavy ulceration with stricturing in the mid rectum      3/26/2025: PET scan:  Significant interval worsening of disease, now with diffuse metastatic disease of the thorax, abdomen and pelvis. Representative lesions as described above.         4/4/2025: Colostomy revision and exam under anesthesia:   Felt to have worsening disease at the rectum therefore AN excision of the rectal mass was not performed.  A colostomy revision was performed.     Residual adenocarcinoma is present in the form of two (2) mesenteric peritoneal deposits, 4mm and 2 mm, respectively  Definitive treatment effect is not seen  Focal mucosal ulceration and granulation tissue-type polyp  Mucosal hemangiomas  Twelve (12) lymph nodes, negative for metastatic carcinoma (0/12)           05/07/2025 Recommenced 5-FU with oxaliplatin again.  Does have some borderline neuropathy and therefore we will dose reduce the oxaliplatin to 65 mg/m .    05/27/2025-06/10/2025 Course of palliative XRT, chemo held for 4 weeks following completion to allow for healing    07/08/2025 Resumed C3 therapy    07/10/2025 Patient proceeded with CT scan at [facility] per request, however, they didn't compare to last images despite request. Scans show metastaic disease but difficult to ascertain progressive disease.       Current Chemo Regimen/TX: FOLFOX with dose reduced oxali to 65 mg/m2  Current Cycle: 4   # of completed cycles: 3  ** C3 was held  for 4 weeks after his final XRT treatment to allow for recovery      Past Medical History:   Diagnosis Date    Adolescent idiopathic scoliosis of thoracolumbar region 11/16/2017    Bunion 03/28/2013    Essential hypertension 03/05/2012    Formatting of this note might be different from the original.   Epic      Hyperlipidemia 05/12/2025    Malignant neoplasm of rectum (H) 03/13/2025    Metastasis from rectal cancer (H)     Pure hypercholesterolemia 10/04/2012    Rectal cancer (H) 05/29/2024    Scoliosis     Severe protein-calorie malnutrition 06/07/2024    Type 2 diabetes mellitus without complication (H) 06/21/2012       Past Surgical History:   Procedure Laterality Date    CATARACT EXTRACTION W/ INTRAOCULAR LENS IMPLANT Left 2021    ENDOSCOPY N/A 02/11/2025    upper and lower    GI SURGERY  04/04/2025    Colostomy revision    PHACOEMULSIFICATION WITH STANDARD INTRAOCULAR LENS IMPLANT Right 09/29/2021    Procedure: right Cataract Extraction with Implant;  Surgeon: Juan Prado MD;  Location: [facility] OR    REVISION COLOSTOMY N/A 04/07/2025    robotic colostomy N/A 06/06/2024       Family History   Problem Relation Age of Onset    Hypertension Mother     Pancreatic Cancer Father 55    Hypertension Sister     Other Problems  (knee replacement) Sister         bilateal knee replacement    Other Problems  (hip replacement) Sister         bilateral hip replacement    Diabetes No family hx of     Coronary Artery Disease No family hx of     Hyperlipidemia No family hx of     Cerebrovascular Disease No family hx of     Breast Cancer No family hx of     Colon Cancer No family hx of     Prostate Cancer No family hx of     Anesthesia Reaction No family hx of     Asthma No family hx of     Genetic Disorder No family hx of     Thyroid Disease No family hx of        Social History     Socioeconomic History    Marital status:      Spouse name: Gayle    Number of children: 0    Years of education: Not on file     Highest education level: Not on file   Occupational History    Occupation: worked at [employer]     Comment: on medical leave    Occupation:      Comment: retired   Tobacco Use    Smoking status: Former     Current packs/day: 0.00     Average packs/day: 0.5 packs/day for 15.0 years (7.5 ttl pk-yrs)     Types: Cigarettes     Start date:      Quit date:      Years since quittin.5     Passive exposure: Past    Smokeless tobacco: Former    Tobacco comments:     Chewed for about 15 years when he smoked.    Vaping Use    Vaping status: Never Used   Substance and Sexual Activity    Alcohol use: Not Currently    Drug use: Never    Sexual activity: Yes   Other Topics Concern    Parent/sibling w/ CABG, MI or angioplasty before 65F 55M? No   Social History Narrative    Not on file     Social Drivers of Health     Financial Resource Strain: Not on file   Food Insecurity: No Food Insecurity (2024)    [facility] [employer] [facility] [employer] [facility]    Hunger Vital Sign     Worried About Running Out of Food in the Last Year: Never true     Ran Out of Food in the Last Year: Never true   Transportation Needs: No Transportation Needs (2024)    [facility] [employer] [facility]    PRAPARE - Transportation     Lack of Transportation (Medical): No     Lack of Transportation (Non-Medical): No   Physical Activity: Not on file   Stress: Not on file   Social Connections: Not on file   Interpersonal Safety: Low Risk  (2025)    Interpersonal Safety     Do you feel physically and emotionally safe where you currently live?: Yes     Within the past 12 months, have you been hit, slapped, kicked or otherwise physically hurt by someone?: No     Within the past 12 months, have you been humiliated or emotionally abused in other ways by your partner or ex-partner?: No   Housing Stability: High Risk (2024)    [facility] [employer] [facility] [employer] [facility]     Housing Stability Vital Sign     Unable to Pay for Housing in the Last Year: No     Number of Times Moved in the Last Year: 2     Homeless in the Last Year: No       Current Outpatient Medications   Medication Sig Dispense Refill    atorvastatin (LIPITOR) 20 MG tablet Take 20 mg by mouth daily      cyanocobalamin (VITAMIN B-12) 1000 MCG tablet Take 1,000 mcg by mouth daily.      dexAMETHasone (DECADRON) 4 MG tablet Take 2 tablets (8 mg) by mouth daily. Take for 2 days, starting the day after chemo. Take with food. 4 tablet 2    Elemental iron 65 mg Vitamin C 125 mg (VITRON C)  MG TABS tablet Take 1 tablet by mouth daily.      Fluorouracil (ADRUCIL) 4,630 mg in sodium chloride 0.9 % 230 mL via CADD pump Infuse 4,630 mg at 5 mL/hr over 46 hours into the vein once for 1 dose. 364651 mL 0    gabapentin (NEURONTIN) 300 MG capsule Take 3 capsules (900 mg) by mouth 3 times daily. 270 capsule 0    glipiZIDE (GLUCOTROL XL) 2.5 MG 24 hr tablet Take 2.5 mg by mouth daily. Outside prescriber      hydrochlorothiazide (HYDRODIURIL) 25 MG tablet Take 25 mg by mouth daily      lidocaine 5% oint/silver sulfadiazine 1% cm/triamcinolone 0.1% cm (ANA LUISA PASTE) compounded ointment Apply topically 4 times daily as needed. 100 g 2    lidocaine-prilocaine (EMLA) 2.5-2.5 % external cream Apply topically daily as needed for moderate pain 30 g 1    lisinopril (ZESTRIL) 40 MG tablet Take 40 mg by mouth daily      metFORMIN (GLUCOPHAGE-XR) 750 MG 24 hr tablet Take 500 mg by mouth 2 times daily (with meals).      multivitamin w/minerals (MULTI-VITAMIN) tablet Take 1 tablet by mouth daily      Oxymetazoline HCl (NASAL DECONGESTANT SPRAY NA) Spray in nostril as needed.      polyethylene glycol (MIRALAX) 17 GM/Dose powder Take 1 Capful by mouth daily.      acetaminophen (TYLENOL) 500 MG tablet Take 650 mg by mouth every 6 hours as needed for mild pain. (Patient not taking: Reported on 7/22/2025)      loperamide (IMODIUM A-D) 2 MG tablet Take 1  "tablet (2 mg) by mouth 4 times daily as needed for diarrhea (Patient not taking: Reported on 7/8/2025) 90 tablet 0    nitroGLYcerin (RECTIV) 0.4 % OINT rectal ointment Apply 1 inch topically every 12 hours. (Patient not taking: Reported on 4/29/2025)      ondansetron (ZOFRAN ODT) 4 MG ODT tab Take 4 mg by mouth every 8 hours as needed for nausea. (Patient not taking: Reported on 7/8/2025)      ondansetron (ZOFRAN) 8 MG tablet Take 1 tablet (8 mg) by mouth every 8 hours as needed for nausea (vomiting). (Patient not taking: Reported on 7/8/2025) 30 tablet 2    prochlorperazine (COMPAZINE) 10 MG tablet Take 1 tablet (10 mg) by mouth every 6 hours as needed for nausea or vomiting. (Patient not taking: Reported on 7/8/2025) 30 tablet 2    traZODone (DESYREL) 100 MG tablet Take 100 mg by mouth at bedtime.       No current facility-administered medications for this visit.        No Known Allergies    Review Of Systems:  A complete review of systems is negative except for the above mentioned items in the interval history.     ECOG Performance Status: 0    Physical Exam:  /60 (BP Location: Right arm, Patient Position: Sitting, Cuff Size: Adult Regular)   Pulse 66   Temp 97.8  F (36.6  C) (Tympanic)   Ht 1.753 m (5' 9\")   Wt 76.2 kg (167 lb 15.9 oz)   SpO2 98%   BMI 24.81 kg/m    GENERAL APPEARANCE: Healthy, alert and in no acute distress.  HEENT: Eyes appear normal without scleral icterus. Extraocular movements intact.   NECK:   Supple with normal range of motion. No asymmetry.  RESP: Lungs clear to auscultation bilaterally, respirations regular and easy.  CARDIOVASCULAR: Regular rate and rhythm. Normal S1, S2; no murmur, gallop, or rub.  ABDOMEN: Soft, non-tender, non-distended. Stoma pink and moist with good output.   MUSCULOSKELETAL: Extremities without gross deformities noted.   SKIN: No suspicious lesions or rashes.  NEURO: Alert and oriented x 3.  Gait steady.  PSYCHIATRIC: Mentation and affect appear " normal.  Mood appropriate.    Laboratory:  Results for orders placed or performed in visit on 07/22/25   Routine UA with micro reflex to culture     Status: Abnormal    Specimen: Urine, NOS   Result Value Ref Range    Color Urine Yellow Colorless, Straw, Light Yellow, Yellow    Appearance Urine Clear Clear    Glucose Urine 50 (A) Negative mg/dL    Bilirubin Urine Negative Negative    Ketones Urine Negative Negative mg/dL    Specific Gravity Urine 1.018 1.003 - 1.035    Blood Urine Negative Negative    pH Urine 6.0 4.7 - 8.0    Protein Albumin Urine 20 (A) Negative mg/dL    Urobilinogen Urine Normal Normal mg/dL    Nitrite Urine Negative Negative    Leukocyte Esterase Urine Negative Negative    Mucus Urine Present (A) None Seen /LPF    RBC Urine 0 <=2 /HPF    WBC Urine <1 <=5 /HPF    Squamous Epithelials Urine 0 <=1 /HPF    Hyaline Casts Urine 5 (H) <=2 /LPF    Narrative    Urine Culture not indicated   Results for orders placed or performed in visit on 07/22/25   Comprehensive metabolic panel     Status: Abnormal   Result Value Ref Range    Sodium 138 135 - 145 mmol/L    Potassium 4.3 3.4 - 5.3 mmol/L    Carbon Dioxide (CO2) 22 22 - 29 mmol/L    Anion Gap 15 7 - 15 mmol/L    Urea Nitrogen 24.6 (H) 8.0 - 23.0 mg/dL    Creatinine 1.52 (H) 0.67 - 1.17 mg/dL    GFR Estimate 49 (L) >60 mL/min/1.73m2    Calcium 9.5 8.8 - 10.4 mg/dL    Chloride 101 98 - 107 mmol/L    Glucose 138 (H) 70 - 99 mg/dL    Alkaline Phosphatase 139 40 - 150 U/L    AST 38 0 - 45 U/L    ALT 12 0 - 70 U/L    Protein Total 6.6 6.4 - 8.3 g/dL    Albumin 3.5 3.5 - 5.2 g/dL    Bilirubin Total 0.3 <=1.2 mg/dL   CBC with platelets and differential     Status: Abnormal   Result Value Ref Range    WBC Count 8.7 4.0 - 11.0 10e3/uL    RBC Count 3.41 (L) 4.40 - 5.90 10e6/uL    Hemoglobin 9.9 (L) 13.3 - 17.7 g/dL    Hematocrit 30.0 (L) 40.0 - 53.0 %    MCV 88 78 - 100 fL    MCH 29.0 26.5 - 33.0 pg    MCHC 33.0 31.5 - 36.5 g/dL    RDW 15.7 (H) 10.0 - 15.0 %     Platelet Count 279 150 - 450 10e3/uL    % Neutrophils 78 %    % Lymphocytes 8 %    % Monocytes 10 %    % Eosinophils 3 %    % Basophils 1 %    % Immature Granulocytes 1 %    NRBCs per 100 WBC 0 <1 /100    Absolute Neutrophils 6.8 1.6 - 8.3 10e3/uL    Absolute Lymphocytes 0.7 (L) 0.8 - 5.3 10e3/uL    Absolute Monocytes 0.9 0.0 - 1.3 10e3/uL    Absolute Eosinophils 0.3 0.0 - 0.7 10e3/uL    Absolute Basophils 0.1 0.0 - 0.2 10e3/uL    Absolute Immature Granulocytes 0.1 <=0.4 10e3/uL    Absolute NRBCs 0.0 10e3/uL   CBC with platelets differential     Status: Abnormal    Narrative    The following orders were created for panel order CBC with platelets differential.  Procedure                               Abnormality         Status                     ---------                               -----------         ------                     CBC with platelets and ...[[id number]]  Abnormal            Final result                 Please view results for these tests on the individual orders.         Imaging Studies:    None this visit    ASSESSMENT/PLAN:    #1 Stage IV rectal cancer: K-pastora wild-type, pMMR     Initially presented with diarrhea in March 2024.  Sigmoidoscopy showed rectal mass infiltrative partially obstructing large mass 8 cm from the anal verge.  MRI pelvis showed carcinoma involving lower, middle and upper rectum extending over a length of roughly 12 cm.  Lesion involves the mesorectum but most significant invasion anterolaterally on the right at the level of the mid rectum.  Neoplasm is contiguous with the posterior margin of the right seminal vesicle but is not clearly invading it.  Extends roughly 1.5 cm beyond the rectal margin.  It also showed numerous enlarged mesorectal lymph nodes.  A inferior mesenteric lymph node was also noted.  There were also enlarged left iliac nodes and right iliac node.     He had already had a diverting ostomy by  on 6/6/2024.     He was then found to have metastatic  disease on PET scan.   PET scan showed 5 foci of abnormal avidity present within the liver, involvement of the common iliac lymph node, upper abdominal para-aortic lymph node, right common iliac node and left perirectal node.  A few inguinal nodes are also avid.     He had completed 12 cycles of FOLFOX from 7/2024-12/2024.  PET scan 9/12/2024 showed very good response.  For his convenience as he was having difficulty with the 5-FU pump, the decision was made to switch to oral capecitabine for maintenance.  He then started maintenance capecitabine Jan 2025     He started treatment on 1/11/2025 but was held when he went to ED on 2/3/2025. Bleeding improved.     He met with his colorectal surgeon, Dr. Cordova for discussion regarding APR with a permanent colostomy. He was taken to surgery on 4/4/2025.  Given the extent of the rectal mass a APR could not be performed however he did have an ostomy revision.     He was noted to have progression systemically on PET scan done 3/26/2025.  He met with Dr. Poon who ultimately recommenced FOLFOX but with oxali dose reduced given prior neuropathy. Following C2, underwent a course of palliative radiation to the rectum. C3 was delayed about 4 weeks for healing. Notes that primary concerns with radiation were skin toxicity and fatigue. He did have a CT scan completed after he completed radiation. This scan did show metastatic disease, however, it wasn't compared to most recent scan so difficult to assess progression.     Today, he returns for C4 chemo. His biggest side effects are that of fatigue and neuropathy. His oxali is dose reduced. We are planning to repeat PET after C6. Today, will proceed with C4. Proceed with C5 pending labs. Pump off orders will be [facility] to [facility]. Plan PET after C6, will schedule today. Will follow-up after with Dr. Poon.     Today, we again reviewed that this is a stage IV cancer and therefore not curable but that we continue treatment in hopes of  providing both QOL and QOL.     2. Fatigue Again discussed being multifactorial. Offered cancer rehab or even PT in [place]. Declining. Trying to be more active at home.     3. Chemo induced neuropathy   Since prior Oxaliplatin. Will leave doses reduced at 65 mg/m2. Gabapentin 900 mg TID helping with symptoms. Will monitor. Discussed that is neuropathy worsens, may need to consider alternative treatment options that would perhaps lessen the degree of neuropathy.     4. Polyuria Will check UA today. Later resulted wo signs of infection.       Patient in agreement with plan and verbalizes understanding. Agrees to call with any questions or concerns.    48 minutes spent in the patient's encounter today with time spent in review of patient's chart along with chart preparation and review of the treatment plan and signing of treatment plan.  Time was also spent with the patient in obtaining a review of systems and performing a physical exam along with detailed review of all test results. Time was also spent in discussing plan for future follow-up and relating instructions for follow-up and in placing future orders.    DANIEL Lopez [facility]  Medical Oncology

## 2025-07-29 DIAGNOSIS — C20 RECTAL CANCER (H): ICD-10-CM

## 2025-08-01 ENCOUNTER — HOME INFUSION BILLING (OUTPATIENT)
Dept: HOME HEALTH SERVICES | Facility: HOME HEALTH | Age: 70
End: 2025-08-01
Payer: COMMERCIAL

## 2025-08-05 ENCOUNTER — PATIENT OUTREACH (OUTPATIENT)
Dept: ONCOLOGY | Facility: OTHER | Age: 70
End: 2025-08-05

## 2025-08-06 ENCOUNTER — TELEPHONE (OUTPATIENT)
Dept: CARE COORDINATION | Facility: OTHER | Age: 70
End: 2025-08-06

## 2025-08-06 DIAGNOSIS — C20 RECTAL CANCER (H): ICD-10-CM

## 2025-08-08 ENCOUNTER — HOME INFUSION BILLING (OUTPATIENT)
Dept: HOME HEALTH SERVICES | Facility: HOME HEALTH | Age: 70
End: 2025-08-08
Payer: COMMERCIAL

## 2025-08-10 ENCOUNTER — HEALTH MAINTENANCE LETTER (OUTPATIENT)
Age: 70
End: 2025-08-10

## 2025-08-12 ENCOUNTER — PATIENT OUTREACH (OUTPATIENT)
Dept: ONCOLOGY | Facility: OTHER | Age: 70
End: 2025-08-12

## 2025-08-13 ENCOUNTER — DOCUMENTATION ONLY (OUTPATIENT)
Dept: CARE COORDINATION | Facility: OTHER | Age: 70
End: 2025-08-13

## 2025-08-18 ENCOUNTER — PATIENT OUTREACH (OUTPATIENT)
Dept: ONCOLOGY | Facility: OTHER | Age: 70
End: 2025-08-18

## 2025-09-04 ENCOUNTER — HOME INFUSION BILLING (OUTPATIENT)
Dept: HOME HEALTH SERVICES | Facility: HOME HEALTH | Age: 70
End: 2025-09-04
Payer: COMMERCIAL

## (undated) RX ORDER — TROPICAMIDE 10 MG/ML
SOLUTION/ DROPS OPHTHALMIC
Status: DISPENSED
Start: 2021-09-29